# Patient Record
Sex: FEMALE | Race: WHITE | HISPANIC OR LATINO | Employment: FULL TIME | URBAN - METROPOLITAN AREA
[De-identification: names, ages, dates, MRNs, and addresses within clinical notes are randomized per-mention and may not be internally consistent; named-entity substitution may affect disease eponyms.]

---

## 2017-09-08 ENCOUNTER — NON-PROVIDER VISIT (OUTPATIENT)
Dept: OBGYN | Facility: CLINIC | Age: 22
End: 2017-09-08
Payer: MEDICAID

## 2017-09-08 DIAGNOSIS — Z32.01 POSITIVE URINE PREGNANCY TEST: ICD-10-CM

## 2017-09-08 LAB
INT CON NEG: NEGATIVE
INT CON POS: POSITIVE
POC URINE PREGNANCY TEST: POSITIVE

## 2017-09-08 PROCEDURE — 81025 URINE PREGNANCY TEST: CPT | Performed by: OBSTETRICS & GYNECOLOGY

## 2017-09-19 ENCOUNTER — HOSPITAL ENCOUNTER (EMERGENCY)
Facility: MEDICAL CENTER | Age: 22
End: 2017-09-20
Attending: EMERGENCY MEDICINE
Payer: MEDICAID

## 2017-09-19 ENCOUNTER — APPOINTMENT (OUTPATIENT)
Dept: RADIOLOGY | Facility: MEDICAL CENTER | Age: 22
End: 2017-09-19
Attending: EMERGENCY MEDICINE
Payer: MEDICAID

## 2017-09-19 DIAGNOSIS — O41.8X10 SUBCHORIONIC HEMATOMA IN FIRST TRIMESTER: ICD-10-CM

## 2017-09-19 DIAGNOSIS — N30.90 CYSTITIS: ICD-10-CM

## 2017-09-19 DIAGNOSIS — O46.8X1 SUBCHORIONIC HEMATOMA IN FIRST TRIMESTER: ICD-10-CM

## 2017-09-19 LAB
ALBUMIN SERPL BCP-MCNC: 4.9 G/DL (ref 3.2–4.9)
ALBUMIN/GLOB SERPL: 1.7 G/DL
ALP SERPL-CCNC: 59 U/L (ref 30–99)
ALT SERPL-CCNC: 11 U/L (ref 2–50)
ANION GAP SERPL CALC-SCNC: 7 MMOL/L (ref 0–11.9)
APPEARANCE UR: ABNORMAL
AST SERPL-CCNC: 14 U/L (ref 12–45)
B-HCG SERPL-ACNC: ABNORMAL MIU/ML (ref 0–5)
BACTERIA #/AREA URNS HPF: ABNORMAL /HPF
BASOPHILS # BLD AUTO: 0.3 % (ref 0–1.8)
BASOPHILS # BLD: 0.03 K/UL (ref 0–0.12)
BILIRUB SERPL-MCNC: 0.2 MG/DL (ref 0.1–1.5)
BILIRUB UR QL STRIP.AUTO: NEGATIVE
BUN SERPL-MCNC: 12 MG/DL (ref 8–22)
CALCIUM SERPL-MCNC: 9.8 MG/DL (ref 8.5–10.5)
CHLORIDE SERPL-SCNC: 107 MMOL/L (ref 96–112)
CO2 SERPL-SCNC: 24 MMOL/L (ref 20–33)
COLOR UR: YELLOW
CREAT SERPL-MCNC: 0.59 MG/DL (ref 0.5–1.4)
CULTURE IF INDICATED INDCX: YES UA CULTURE
EOSINOPHIL # BLD AUTO: 0.08 K/UL (ref 0–0.51)
EOSINOPHIL NFR BLD: 0.8 % (ref 0–6.9)
EPI CELLS #/AREA URNS HPF: ABNORMAL /HPF
ERYTHROCYTE [DISTWIDTH] IN BLOOD BY AUTOMATED COUNT: 52.2 FL (ref 35.9–50)
GFR SERPL CREATININE-BSD FRML MDRD: >60 ML/MIN/1.73 M 2
GLOBULIN SER CALC-MCNC: 2.9 G/DL (ref 1.9–3.5)
GLUCOSE SERPL-MCNC: 84 MG/DL (ref 65–99)
GLUCOSE UR STRIP.AUTO-MCNC: NEGATIVE MG/DL
HCG UR QL: POSITIVE
HCT VFR BLD AUTO: 34.4 % (ref 37–47)
HGB BLD-MCNC: 11.4 G/DL (ref 12–16)
HYALINE CASTS #/AREA URNS LPF: ABNORMAL /LPF
IMM GRANULOCYTES # BLD AUTO: 0.04 K/UL (ref 0–0.11)
IMM GRANULOCYTES NFR BLD AUTO: 0.4 % (ref 0–0.9)
KETONES UR STRIP.AUTO-MCNC: NEGATIVE MG/DL
LEUKOCYTE ESTERASE UR QL STRIP.AUTO: ABNORMAL
LYMPHOCYTES # BLD AUTO: 2.53 K/UL (ref 1–4.8)
LYMPHOCYTES NFR BLD: 26.8 % (ref 22–41)
MCH RBC QN AUTO: 27.9 PG (ref 27–33)
MCHC RBC AUTO-ENTMCNC: 33.1 G/DL (ref 33.6–35)
MCV RBC AUTO: 84.1 FL (ref 81.4–97.8)
MICRO URNS: ABNORMAL
MONOCYTES # BLD AUTO: 0.61 K/UL (ref 0–0.85)
MONOCYTES NFR BLD AUTO: 6.5 % (ref 0–13.4)
NEUTROPHILS # BLD AUTO: 6.15 K/UL (ref 2–7.15)
NEUTROPHILS NFR BLD: 65.2 % (ref 44–72)
NITRITE UR QL STRIP.AUTO: NEGATIVE
NRBC # BLD AUTO: 0 K/UL
NRBC BLD AUTO-RTO: 0 /100 WBC
NUMBER OF RH DOSES IND 8505RD: NORMAL
PH UR STRIP.AUTO: 5.5 [PH]
PLATELET # BLD AUTO: 282 K/UL (ref 164–446)
PMV BLD AUTO: 10.4 FL (ref 9–12.9)
POTASSIUM SERPL-SCNC: 4 MMOL/L (ref 3.6–5.5)
PROT SERPL-MCNC: 7.8 G/DL (ref 6–8.2)
PROT UR QL STRIP: NEGATIVE MG/DL
RBC # BLD AUTO: 4.09 M/UL (ref 4.2–5.4)
RBC # URNS HPF: ABNORMAL /HPF
RBC UR QL AUTO: ABNORMAL
RH BLD: NORMAL
SODIUM SERPL-SCNC: 138 MMOL/L (ref 135–145)
SP GR UR REFRACTOMETRY: 1.03
SP GR UR STRIP.AUTO: 1.03
UROBILINOGEN UR STRIP.AUTO-MCNC: 1 MG/DL
WBC # BLD AUTO: 9.4 K/UL (ref 4.8–10.8)
WBC #/AREA URNS HPF: ABNORMAL /HPF

## 2017-09-19 PROCEDURE — 80053 COMPREHEN METABOLIC PANEL: CPT

## 2017-09-19 PROCEDURE — 99284 EMERGENCY DEPT VISIT MOD MDM: CPT

## 2017-09-19 PROCEDURE — 81025 URINE PREGNANCY TEST: CPT

## 2017-09-19 PROCEDURE — 81001 URINALYSIS AUTO W/SCOPE: CPT

## 2017-09-19 PROCEDURE — 87086 URINE CULTURE/COLONY COUNT: CPT

## 2017-09-19 PROCEDURE — 86901 BLOOD TYPING SEROLOGIC RH(D): CPT

## 2017-09-19 PROCEDURE — 85025 COMPLETE CBC W/AUTO DIFF WBC: CPT

## 2017-09-19 PROCEDURE — 84702 CHORIONIC GONADOTROPIN TEST: CPT

## 2017-09-19 RX ORDER — NITROFURANTOIN 25; 75 MG/1; MG/1
100 CAPSULE ORAL 2 TIMES DAILY
Qty: 10 CAP | Refills: 0 | Status: SHIPPED | OUTPATIENT
Start: 2017-09-19 | End: 2017-09-24

## 2017-09-19 ASSESSMENT — ENCOUNTER SYMPTOMS
VOMITING: 0
CHILLS: 0
FEVER: 0
HEADACHES: 0
ABDOMINAL PAIN: 0

## 2017-09-19 ASSESSMENT — PAIN SCALES - GENERAL: PAINLEVEL_OUTOF10: 0

## 2017-09-19 ASSESSMENT — LIFESTYLE VARIABLES: DO YOU DRINK ALCOHOL: NO

## 2017-09-20 VITALS
OXYGEN SATURATION: 96 % | HEART RATE: 82 BPM | WEIGHT: 142.2 LBS | HEIGHT: 64 IN | SYSTOLIC BLOOD PRESSURE: 117 MMHG | TEMPERATURE: 97.2 F | BODY MASS INDEX: 24.28 KG/M2 | RESPIRATION RATE: 16 BRPM | DIASTOLIC BLOOD PRESSURE: 73 MMHG

## 2017-09-20 PROCEDURE — 76817 TRANSVAGINAL US OBSTETRIC: CPT

## 2017-09-20 NOTE — ED NOTES
Pt Given discharge instructions/ prescriptions/ home care instructions, Pt verbalized understanding of instructions given, pt ambulatory to CHANDRA mchugh.

## 2017-09-20 NOTE — ED NOTES
Received report from Diana VALERA. Pt care responsibilities assumed. Pt resting in bed, Monitors in place, VSS, will continue to monitor.

## 2017-09-20 NOTE — ED PROVIDER NOTES
"ED Provider Note    Scribed for TISH King II* by Long Mtaos. 2017  10:39 PM    Means of Arrival: Walk In  History obtained by: Patient  Limitations: None     CHIEF COMPLAINT  Chief Complaint   Patient presents with   • Vaginal Bleeding     Starting today.  Spotting.     • Pregnancy     Estimated 3 months pregnant, no pre- care currently.         HPI  Debi Juarez is a 21 y.o. female who presents for vaginal bleeding. She had an onset of vaginal bleeding this morning which has been light which she describes as \"spotting\". She reports her current vaginal bleeding is similar to her baseline menstrual periods. She denies any abdominal cramping associated with her vaginal bleeding. Debi estimates she found out she was pregnant in approximately July. She has not yet followed up with OBGYN. She has an appointment scheduled with OBGYN at [facility] on . She denies any lightheadedness, vomiting, abdominal pain, headache, fever, or chills.     REVIEW OF SYSTEMS  Review of Systems   Constitutional: Negative for chills and fever.   Gastrointestinal: Negative for abdominal pain and vomiting.   Genitourinary:        Positive vaginal bleeding    Neurological: Negative for headaches.        No lightheadedness    All other systems reviewed and are negative.    See HPI for further details.    PAST MEDICAL HISTORY   has a past medical history of Anemia.    SOCIAL HISTORY  Social History     Social History Main Topics   • Smoking status: Never Smoker   • Smokeless tobacco: Never Used   • Alcohol use No   • Drug use: No     SURGICAL HISTORY  patient denies any surgical history    CURRENT MEDICATIONS  Home Medications     Reviewed by Diana Mccall R.N. (Registered Nurse) on 17 at 2223  Med List Status: Complete   Medication Last Dose Status        Patient Christiano Taking any Medications                       ALLERGIES  No Known Allergies    PHYSICAL EXAM  VITAL SIGNS: BP " "120/75   Pulse 87   Temp 36.2 °C (97.2 °F) (Temporal)   Resp 18   Ht 1.626 m (5' 4\")   Wt 64.5 kg (142 lb 3.2 oz)   LMP 06/05/2017 (Approximate)   SpO2 99%   BMI 24.41 kg/m²     Pulse ox interpretation: I interpret this pulse ox as normal.  Constitutional:  female laying on bed in no distress.   HENT: No signs of trauma, Bilateral external ears normal, Nose normal.   Eyes: Pupils are equal and reactive, Conjunctiva normal, Non-icteric.   Neck: Normal range of motion, No tenderness, Supple, No stridor.   Lymphatic: No lymphadenopathy noted.   Cardiovascular: Regular rate and rhythm, no murmurs.   Thorax & Lungs: Normal breath sounds, No respiratory distress, No wheezing, No chest tenderness.   Abdomen: Palpable uterus and pelvis. Bowel sounds normal, Soft, No tenderness, No masses, No pulsatile masses. No peritoneal signs.  Pelvic: Closed os, mild amount of dark blood in vault. No adnexal tenderness.   Skin: Warm, Dry, No erythema, No rash.   Back: No bony tenderness, No CVA tenderness.   Extremities: Intact distal pulses, No edema, No tenderness, No cyanosis.  Musculoskeletal: Good range of motion in all major joints. No tenderness to palpation or major deformities noted.   Neurologic: Alert , Normal motor function, Normal sensory function, No focal deficits noted.   Psychiatric: Affect normal, Judgment normal, Mood normal.       DIAGNOSTIC STUDIES / PROCEDURES    LABS  Pertinent Labs & Imaging studies reviewed. (See chart for details)    RADIOLOGY  US-OB PELVIS TRANSVAGINAL   Final Result         1.  Live intrauterine pregnancy at calculated gestational age 11 weeks 1 day for estimated date of delivery April 9, 2018.   2.  Subchorionic hemorrhage.   3.  Large heterogeneous echogenic area near the internal cervical os without vascularity, could represent clotted blood or poorly vascular echogenic mass. Follow-up evaluation with repeat pelvic sonogram in 4 weeks recommended. If findings persist MRI of "    the pelvis may be required for definitive evaluation.      These findings were discussed with the patient's clinician, Mehul Crystal II, on 2017 12:22 AM.         Pertinent Labs & Imaging studies reviewed. (See chart for details)    COURSE & MEDICAL DECISION MAKING  Pertinent Labs & Imaging studies reviewed. (See chart for details)    10:39 PM This is a 21 y.o. female who presents with vaginal bleeding and the differential diagnosis includes but is not limited to threatened , UTI. Ordered for RH Type, CBC, CMP, urinalysis, BetaHCG, urine culture to evaluate.    12:13 AM Performed pelvic exam. Chaperone by female RN.     1:10 AM Recheck: Patient re-evaluated at [facility]. The patient was updated of her US OB results showing live intrauterine pregnancy at calculated gestational age of 11 weeks 1 day. Estimated date of delivery: 18.      1:17 AM Spoke with Dr. Lopez, Gynecology, about the patient's condition. She recommended progesterone suppository 100 mg BID. Does not need to follow up earlier than her  appointment.     1:26 AM Recheck: Patient re-evaluated at [facility]. The patient was updated of plan of care that was established after consulting with Dr. Lopez as noted above. She understands to continue with her scheduled appointment with [facility] on . Patient was prescribed Macrobid for cystitis.   Prescription for Progesterone suppositories at recommendation of Dr. Lopez     The patient will return for worsening symptoms and is stable at the time of discharge. The patient verbalizes understanding and will comply.      DISPOSITION:  Patient will be discharged home in stable condition.    FOLLOW UP:  [facility]  [address] [address]  [phone]    Go to  As previously scheduled on     [facility], Emergency Dept  [address] [id number] [phone]  Go to  If symptoms  worsen, light headed, uncontrolled bleeding, fevers or other serious concerns      OUTPATIENT MEDICATIONS:  Discharge Medication List as of 9/20/2017  1:28 AM      START taking these medications    Details   nitrofurantoin monohydr macro (MACROBID) 100 MG Cap Take 1 Cap by mouth 2 times a day for 5 days., Disp-10 Cap, R-0, Print Rx Paper              FINAL IMPRESSION  1. Subchorionic hemorrhage  2. Cystitis     Future Appointments  Date Time Provider Department Center   10/2/2017 1:30 PM PC MD PCTR Long WELLS (Scribe), am scribing for, and in the presence of, JAMISON King II.    Electronically signed by: Long Matos (Tegan), 9/19/2017    Mehul ZEPEDA II, M* personally performed the services described in this documentation, as scribed by Long Matos in my presence, and it is both accurate and complete.    The note accurately reflects work and decisions made by me.  Mehul Crystal II  9/20/2017  12:33 AM

## 2017-09-20 NOTE — ED NOTES
.  Chief Complaint   Patient presents with   • Vaginal Bleeding     Starting today.  Spotting.     • Pregnancy     Estimated 3 months pregnant, no pre- care currently.       BIB boyfriend for above.  Denies abd pain or other symptoms.     Patient educated on triage process and wait time.  Instructed to notify staff for worsening or new symptoms.  Placed in lobby.

## 2017-09-20 NOTE — DISCHARGE INSTRUCTIONS
Urinary Tract Infection  Urinary tract infections (UTIs) can develop anywhere along your urinary tract. Your urinary tract is your body's drainage system for removing wastes and extra water. Your urinary tract includes two kidneys, two ureters, a bladder, and a urethra. Your kidneys are a pair of bean-shaped organs. Each kidney is about the size of your fist. They are located below your ribs, one on each side of your spine.  CAUSES  Infections are caused by microbes, which are microscopic organisms, including fungi, viruses, and bacteria. These organisms are so small that they can only be seen through a microscope. Bacteria are the microbes that most commonly cause UTIs.  SYMPTOMS   Symptoms of UTIs may vary by age and gender of the patient and by the location of the infection. Symptoms in young women typically include a frequent and intense urge to urinate and a painful, burning feeling in the bladder or urethra during urination. Older women and men are more likely to be tired, shaky, and weak and have muscle aches and abdominal pain. A fever may mean the infection is in your kidneys. Other symptoms of a kidney infection include pain in your back or sides below the ribs, nausea, and vomiting.  DIAGNOSIS  To diagnose a UTI, your caregiver will ask you about your symptoms. Your caregiver also will ask to provide a urine sample. The urine sample will be tested for bacteria and white blood cells. White blood cells are made by your body to help fight infection.  TREATMENT   Typically, UTIs can be treated with medication. Because most UTIs are caused by a bacterial infection, they usually can be treated with the use of antibiotics. The choice of antibiotic and length of treatment depend on your symptoms and the type of bacteria causing your infection.  HOME CARE INSTRUCTIONS  · If you were prescribed antibiotics, take them exactly as your caregiver instructs you. Finish the medication even if you feel better after you  have only taken some of the medication.  · Drink enough water and fluids to keep your urine clear or pale yellow.  · Avoid caffeine, tea, and carbonated beverages. They tend to irritate your bladder.  · Empty your bladder often. Avoid holding urine for long periods of time.  · Empty your bladder before and after sexual intercourse.  · After a bowel movement, women should cleanse from front to back. Use each tissue only once.  SEEK MEDICAL CARE IF:   · You have back pain.  · You develop a fever.  · Your symptoms do not begin to resolve within 3 days.  SEEK IMMEDIATE MEDICAL CARE IF:   · You have severe back pain or lower abdominal pain.  · You develop chills.  · You have nausea or vomiting.  · You have continued burning or discomfort with urination.  MAKE SURE YOU:   · Understand these instructions.  · Will watch your condition.  · Will get help right away if you are not doing well or get worse.     This information is not intended to replace advice given to you by your health care provider. Make sure you discuss any questions you have with your health care provider.     Document Released: 2006 Document Revised: 2016 Document Reviewed: 2013  [employer] Interactive Patient Education © [employer]vier Inc.    Pregnancy and Urinary Tract Infection  A urinary tract infection (UTI) is a bacterial infection of the urinary tract. Infection of the urinary tract can include the ureters, kidneys (pyelonephritis), bladder (cystitis), and urethra (urethritis). All pregnant women should be screened for bacteria in the urinary tract. Identifying and treating a UTI will decrease the risk of  labor and developing more serious infections in both the mother and baby.  CAUSES  Bacteria germs cause almost all UTIs.   RISK FACTORS  Many factors can increase your chances of getting a UTI during pregnancy. These include:  · Having a short urethra.  · Poor toilet and hygiene habits.  · Sexual intercourse.  · Blockage of  urine along the urinary tract.  · Problems with the pelvic muscles or nerves.  · Diabetes.  · Obesity.  · Bladder problems after having several children.  · Previous history of UTI.  SIGNS AND SYMPTOMS   · Pain, burning, or a stinging feeling when urinating.  · Suddenly feeling the need to urinate right away (urgency).  · Loss of bladder control (urinary incontinence).  · Frequent urination, more than is common with pregnancy.  · Lower abdominal or back discomfort.  · Cloudy urine.  · Blood in the urine (hematuria).  · Fever.   When the kidneys are infected, the symptoms may be:  · Back pain.  · Flank pain on the right side more so than the left.  · Fever.  · Chills.  · Nausea.  · Vomiting.  DIAGNOSIS   A urinary tract infection is usually diagnosed through urine tests. Additional tests and procedures are sometimes done. These may include:  · Ultrasound exam of the kidneys, ureters, bladder, and urethra.  · Looking in the bladder with a lighted tube (cystoscopy).  TREATMENT  Typically, UTIs can be treated with antibiotic medicines.   HOME CARE INSTRUCTIONS   · Only take over-the-counter or prescription medicines as directed by your health care provider. If you were prescribed antibiotics, take them as directed. Finish them even if you start to feel better.  · Drink enough fluids to keep your urine clear or pale yellow.  · Do not have sexual intercourse until the infection is gone and your health care provider says it is okay.  · Make sure you are tested for UTIs throughout your pregnancy. These infections often come back.   Preventing a UTI in the Future  · Practice good toilet habits. Always wipe from front to back. Use the tissue only once.  · Do not hold your urine. Empty your bladder as soon as possible when the urge comes.  · Do not douche or use deodorant sprays.  · Wash with soap and warm water around the genital area and the anus.  · Empty your bladder before and after sexual intercourse.  · Wear underwear  with a cotton crotch.  · Avoid caffeine and carbonated drinks. They can irritate the bladder.  · Drink cranberry juice or take cranberry pills. This may decrease the risk of getting a UTI.  · Do not drink alcohol.  · Keep all your appointments and tests as scheduled.   SEEK MEDICAL CARE IF:   · Your symptoms get worse.  · You are still having fevers 2 or more days after treatment begins.  · You have a rash.  · You feel that you are having problems with medicines prescribed.  · You have abnormal vaginal discharge.  SEEK IMMEDIATE MEDICAL CARE IF:   · You have back or flank pain.  · You have chills.  · You have blood in your urine.  · You have nausea and vomiting.  · You have contractions of your uterus.  · You have a gush of fluid from the vagina.  MAKE SURE YOU:  · Understand these instructions.    · Will watch your condition.    · Will get help right away if you are not doing well or get worse.       This information is not intended to replace advice given to you by your health care provider. Make sure you discuss any questions you have with your health care provider.     Document Released: 04/13/2012 Document Revised: 10/08/2014 Document Reviewed: 07/17/2014  [employer] Interactive Patient Education ©2016 [employer]    Infección urinaria en el embarazo   (Urinary Tract Infection in Pregnancy)  Zoraida infección urinaria (IU) puede ocurrir en cualquier lugar del tracto urinario. La infección urinaria puede comprometer los utéteres, los riñones (pielonefritis), la vejiga (cistitis) y la uretra (uretritis). Todas las mujeres embarazadas deben ser estudiadas para diagnosticar la presencia de bacterias en el tracto urinario. La identificación y el tratamiento de zoraida infección urinaria disminuye el riesgo de un parto prematuro y de desarrollar infecciones más graves en la madre y el bebé.   CAUSAS   Las bacterias causan norma todas las infecciones urinarias. Hay muchos factores que pueden aumentar nehal probabilidades de  contraer jas infección urinaria lee el embarazo. Ellos son:   · Tener jas uretra corta.  · Falta de aseo y malos hábitos de higiene.  · Las relaciones sexuales.  · Obstrucción de la orina en el tracto urinario.  · Problemas con los músculos o nervios pélvicos.  · Diabetes.  · Obesidad.  · Problemas en la vejiga después de tener varios hijos.  · Antecedentes de infección urinaria.  SÍNTOMAS   · Dolor, ardor o sensación de ardor al orinar.  · Sentir la necesidad de orinar de inmediato (urgencia).  · Pérdida del control vesical (incontinencia urinaria).  · Orinar con más frecuencia de lo común en el embarazo.  · Malestar en la gisele inferior del abdomen o en la espalda.  · Orina turbia.  · Ole en la orina (hematuria).  · Fiebre.   Cuando se infectan los riñones, los síntomas pueden ser:   · Dolor de espalda.  · Dolor lateral en el lado derecho más que el lado boaz.  · Fiebre.  · Escalofríos.  · Náuseas.  · Vómitos.  DIAGNÓSTICO   · Análisis de orina.  · Los estudios o procedimientos adicionales pueden ser:  · Ecografía de los riñones, los uréteres, la vejiga y la uretra.  · Observar la vejiga con un tubo que ilumina (cistoscopia).  · Ciertos estudios radiográficos sólo cuando sea absolutamente necesario.  Averigüe los resultados de nehal análisis  Consulte la fecha en que los resultados estarán disponibles. Asegúrese de obtener los resultados.   TRATAMIENTO   · Antibióticos por vía oral.  · Antibióticos por la vena (vía intravenosa), si es necesario.  INSTRUCCIONES PARA EL CUIDADO EN EL HOGAR   · [place] los antibióticos kaite se le indicó. Tómelos todos, aunque se sienta mejor. [place] sólo la medicación que le indicó el profesional.  · Debe ingerir jas gran cantidad de líquido para mantener la orina de nicolás jose alberto o color amarillo pálido.  · No tenga relaciones sexuales hasta que la infección haya desaparecido o el médico la autorice.  · Asegúrese de que le milagros estudios para detectar jas infección urinaria lee  el embarazo si está embarazada. Estas infecciones suelen reaparecer.   Para prevenir jas infección urinaria en el futuro:  · Practique buenos hábitos higiénicos. Siempre debe limpiarse desde adelante hacia atrás. Use el tissue sólo jas vez.  · No retenga la orina. Orine herzog pronto katie sea posible cuando tenga ganas.  · No se honey duchas vaginales ni use desodorantes en aerosol.  · Lave con agua tibia y jabón alrededor de la gisele genital y el ano.  · Vacíe la vejiga antes y después de tener relaciones sexuales.  · Use ropa interior con algodón en la entrepierna.  · Evite la cafeína y las bebidas gaseosas. Estas sustancias irritan la vejiga.  · Paulette jugo de arándanos o tome comprimidos de arándano. [place] puede disminuir el riesgo de sufrir jas infección urinaria.  · No paulette alcohol.  · Cumpla con las visitas de control y hágase todos los análisis según lo programado.   SOLICITE ATENCIÓN MÉDICA SI:   · Los síntomas empeoran.  · Tiene fiebre aún después de 2 días de iniciado el tratamiento.  · Aparece jas erupción cutánea.  · Siente que usted tiene problemas con los medicamentos recetados.  · Observa jas hemorragia vaginal anormal.  SOLICITE ATENCIÓN MÉDICA DE INMEDIATO SI:   · Siente dolor en la espalda o en un flanco.  · Comienza a sentir escalofríos.  · Observa cindy en la orina.  · Presenta náuseas o vómitos.  · Siente contracciones en el útero.  · Tiene jas perdida repentina de líquido por la vagina.  ASEGÚRESE DE QUE:   · Comprende estas instrucciones.  · Controlará pollock enfermedad.  · Solicitará ayuda de inmediato si no mejora o si empeora.  Document Released: 09/11/2013 Document Revised: 12/04/2013  [employer]® Patient Information ©2014 [employer].      Hematoma subcoriónico  (Subchorionic Hematoma)  Un hematoma subcoriónico es jsa acumulación de cindy entre la pared externa de la placenta y la pared interna del la matriz (útero). La placenta es el órgano que conecta el feto a la pared del útero. La placenta  realiza la función de alimentación, respiración (oxígeno al feto) y el trabajo de eliminación de desechos (excreción) del feto.   Un hematoma subcoriónico es la anormalidad más frecuente encontrada en jas ecografía lee el primer trimestre o principios del padmini trimestre del embarazo. Si ha habido poca o ninguna hemorragia vaginal, generalmente los pequeños hematomas se reducen por pollock propia cuenta y no afectan al bebé ni al embarazo. La cindy es absorbida gradualmente lee jas o dos semanas. Cuando la hemorragia comienza más tarde en el embarazo o el hematoma es más shade o se produce en jas paciente de edad avanzada, el resultado puede no ser herzog cotton. Los grandes hematomas pueden agrandarse aún más y aumenta las posibilidades de aborto espontáneo. El hematoma subcoriónico también aumenta el riesgo de desprendimiento precoz de la placenta del útero, muerte fetal y parto prematuro.  INSTRUCCIONES PARA EL CUIDADO EN EL HOGAR   · Repose en cama si el médico se lo recomienda. Aunque el reposo en cama no evitará la hemorragia o un aborto espontáneo, pollock médico puede recomendarlo.  · Evite levantar objetos pesados (más de 10 libras [4,5 kg]), hacer ejercicio, tener relaciones sexuales o realizar duchas vaginales según se lo indique el profesional.  · Lleve un registro de la cantidad y aaron de remojo (saturación) de las toallas higiénicas que utiliza cada día. Anote esta información.  · No use tampones.  · Cumpla con todas las visitas de control, según le indique pollock médico. El profesional podrá pedirle que se realice análisis de seguimiento, pruebas de ultrasonido o ambas.  SOLICITE ATENCIÓN MÉDICA DE INMEDIATO SI:   · Siente calambres intensos en el estómago, en la espalda, en el abdomen o en la pelvis.  · Tiene fiebre.  · Elimina coágulos o tejidos grandes. Guarde los tejidos para que pollock médico los nely.  · Si la hemorragia aumenta o siente mareos, debilidad o tiene episodios de desmayos.     Esta  información no tiene katie fin reemplazar el consejo del médico. Asegúrese de hacerle al médico cualquier pregunta que tenga.     Document Released: 04/05/2010 Document Revised: 10/08/2014  [employer]vier Interactive Patient Education ©2016 [employer] Inc.

## 2017-09-21 LAB
BACTERIA UR CULT: NORMAL
SIGNIFICANT IND 70042: NORMAL
SITE SITE: NORMAL
SOURCE SOURCE: NORMAL

## 2017-10-02 ENCOUNTER — INITIAL PRENATAL (OUTPATIENT)
Dept: OBGYN | Facility: CLINIC | Age: 22
End: 2017-10-02
Payer: MEDICAID

## 2017-10-02 VITALS
HEIGHT: 64 IN | BODY MASS INDEX: 23.9 KG/M2 | SYSTOLIC BLOOD PRESSURE: 104 MMHG | DIASTOLIC BLOOD PRESSURE: 64 MMHG | WEIGHT: 140 LBS

## 2017-10-02 DIAGNOSIS — N93.8 DUB (DYSFUNCTIONAL UTERINE BLEEDING): ICD-10-CM

## 2017-10-02 PROCEDURE — 76830 TRANSVAGINAL US NON-OB: CPT | Performed by: OBSTETRICS & GYNECOLOGY

## 2017-10-02 PROCEDURE — 99201 PR OFFICE/OUTPT VISIT,NEW,LEVL I: CPT | Mod: 25 | Performed by: OBSTETRICS & GYNECOLOGY

## 2017-10-02 NOTE — PROGRESS NOTES
Pt. Here for  visit today.  # [phone]  First prenatal care  Pt. States no complaints   Pharmacy verified

## 2017-10-02 NOTE — PROGRESS NOTES
"Confirmation of pregnancy;    eDbi Juarez is a 22 y.o.  here for dysfunctional uterine bleeding. Patient denies bleeding or cramps.    Ht 1.626 m (5' 4\")   Wt 63.5 kg (140 lb)   LMP 2017   BMI 24.03 kg/m²     Transvaginal ultrasound; as performed and read by myself; intrauterine gestational sac containing headley pregnancy positive cardiac and fetal motion, crown-rump length consistent with 13 weeks zero days, EDC 18, minimal evidence of subchorionic hemorrhage        Impression;  Viable pregnancy at 13 weeks    Plan;  Followup with nurse midwife in 2 weeks for initial OB            "

## 2017-10-25 ENCOUNTER — INITIAL PRENATAL (OUTPATIENT)
Dept: OBGYN | Facility: CLINIC | Age: 22
End: 2017-10-25
Payer: MEDICAID

## 2017-10-25 VITALS — DIASTOLIC BLOOD PRESSURE: 70 MMHG | WEIGHT: 143 LBS | BODY MASS INDEX: 24.55 KG/M2 | SYSTOLIC BLOOD PRESSURE: 100 MMHG

## 2017-10-25 DIAGNOSIS — Z34.00 SUPERVISION OF NORMAL FIRST PREGNANCY, ANTEPARTUM: ICD-10-CM

## 2017-10-25 DIAGNOSIS — Z34.02 ENCOUNTER FOR SUPERVISION OF NORMAL FIRST PREGNANCY IN SECOND TRIMESTER: Primary | ICD-10-CM

## 2017-10-25 LAB
APPEARANCE UR: NORMAL
BILIRUB UR STRIP-MCNC: NORMAL MG/DL
COLOR UR AUTO: NORMAL
GLUCOSE UR STRIP.AUTO-MCNC: NEGATIVE MG/DL
KETONES UR STRIP.AUTO-MCNC: NEGATIVE MG/DL
LEUKOCYTE ESTERASE UR QL STRIP.AUTO: NORMAL
NITRITE UR QL STRIP.AUTO: NEGATIVE
PH UR STRIP.AUTO: 6 [PH] (ref 5–8)
PROT UR QL STRIP: NORMAL MG/DL
RBC UR QL AUTO: NORMAL
SP GR UR STRIP.AUTO: 1.02
UROBILINOGEN UR STRIP-MCNC: NORMAL MG/DL

## 2017-10-25 PROCEDURE — 81002 URINALYSIS NONAUTO W/O SCOPE: CPT | Performed by: NURSE PRACTITIONER

## 2017-10-25 PROCEDURE — 59401 PR NEW OB VISIT: CPT | Performed by: NURSE PRACTITIONER

## 2017-10-25 PROCEDURE — 90686 IIV4 VACC NO PRSV 0.5 ML IM: CPT | Performed by: NURSE PRACTITIONER

## 2017-10-25 PROCEDURE — 90471 IMMUNIZATION ADMIN: CPT | Performed by: NURSE PRACTITIONER

## 2017-10-25 NOTE — LETTER
Cystic Fibrosis Carrier Testing  Debi Juarez    The following information is about a blood test that can be done to determine if you and/or your partner carry the gene for cystic fibrosis.    WHAT IS CYSTIC FIBROSIS?  · Cystic fibrosis (CF) is an inherited disease that affects more than 25,000 American children and young adults.  · Symptoms of CF vary but include lung congestion, pneumonia, diarrhea and poor growth.  Most people with CF have severe medical problems and some die at a young age.  Others have so few symptoms they are unaware they have CF.  · CF does not affect intelligence.  · Although there is no cure for CF at this time, scientists are making progress in improving treatment and in searching for a cure.  In the past many people with CF  at a very young age.  Today, many are living into their 20’s and 30’s.    IS THERE A CHANCE MY BABY COULD HAVE CYSTIC FIBROSIS?  · You can have a child with CF even if there is no history in your family (see chart below).  · CF testing can help determine if you are a carrier and at risk to have a child with CF.  Note: if both parents are carriers, there is a 1 in 4 (25%) chance with each pregnancy that they will have a child with CF.  · Carriers have one normal CF gene and one altered CF gene.  · People with CF have two altered CF genes.  · Most people have two normal copies of the CF gene.    Approximate risk that a couple with no family history of cystic fibrosis will have a child with cystic fibrosis:    Ethnic background / Risk     couple:  1 in 2,500   couple:  1 in 15,000            couple:  1 in 8,000     American couple:  1 in 32,000     WHAT TESTING IS AVAILABLE?  · There is a blood test that can be done to find out if you or your partner is a carrier.  · It is important to understand that CF carrier testing does not detect all CF carriers.  · If the test shows that you are both CF carriers, you unborn baby  can be tested to find out if the baby has CF.    HOW MUCH DOES IT COST TO HAVE CYSTIC FIBROSIS CARRIER TESTING?  · Cost and insurance coverage for CF carrier testing vary depending upon the laboratory used and your insurance policy.  · The average cost for CF carrier testing is $300 per person.  · Your genetic counselor can provide you with more information about cystic fibrosis carrier testing.    _____  Yes, I am interested in discussing carrier testing with a genetic counselor.    _____  No, I am not interested in CF carrier testing or in receiving more information about CF carrier testing.      Client signature: ________________________________________  10/25/2017

## 2017-10-25 NOTE — PROGRESS NOTES
Subjective:   Debi Juarez is a 22 y.o.  who presents for her new OB exam.  She is 16w2d with an ZAIN of Estimated Date of Delivery: 18 by  at 13 weeks. She is feeling well and has no concerns at this time. Denies VB, LOF, contractions or pain. No ER visits or previous care in this pregnancy. Denies dysuria, vaginal DC, fever. Reports no fetal movement. Desires AFP.  Declines CF.      Past Medical History:   Diagnosis Date   • Anemia        History reviewed. No pertinent surgical history.     OB History    Para Term  AB Living   1             SAB TAB Ectopic Molar Multiple Live Births                    # Outcome Date GA Lbr Rosales/2nd Weight Sex Delivery Anes PTL Lv   1 Current                    Gynecological Hx: Denies any hx of STIs, including HSV. Denies any vulvovaginal disorders and no hx of abnormal cervical cytology. Last pap not done    Sexual Hx: One current male partner, who is FOB     Contraceptive Hx: Has used pills in the past and has since discontinued use.     History reviewed. No pertinent family history.  Denies any genetic disorders in family history.     Social History     Social History   • Marital status: Single     Spouse name: N/A   • Number of children: N/A   • Years of education: N/A     Occupational History   • Not on file.     Social History Main Topics   • Smoking status: Never Smoker   • Smokeless tobacco: Never Used   • Alcohol use No   • Drug use: No   • Sexual activity: Not Currently     Partners: Male      Comment: none     Other Topics Concern   • Not on file     Social History Narrative   • No narrative on file       FOB is invovled and lives with Debi Juarez.  Pregnancy is unplanned but desired.    She is currently working at [employer], denies any heavy lifting or exposure to potential teratogens like environmental or occupational toxins.   Denies alcohol use, drug use, or tobacco use in pregnancy.   Denies any current or hx of  sexual, emotional or physical abuse or trauma.     Current Medications: PNV, progesterone suppositories   Allergies: Denies allergies to medications, food, or environmental allergies    Objective:      Vitals:    10/25/17 0804   BP: 100/70   Weight: 64.9 kg (143 lb)        See Prenatal Physical and Prenatal Vitals  UA WNL today      Assessment:      1.  IUP @ 16w2d per  at 13 weeks      2.  S=D      3.  See problem list as follows       There are no active problems to display for this patient.        Plan:   - Pt to discontinue progesterone suppositories at this time as pregnancy is stable  -  GC/CT & pap done today   - AFP ordered  - Flu vaccine today   - Prenatal labs ordered - lab slip given  - Discussed PNV, nutrition, adequate water intake, and exercise/weight gain in pregnancy  - NOB informational packet with anticipatory guidance given  - Information on Centering Pregnancy given, pt declines  - S/sx of pregnancy warning signs and PTL precautions given  - Complete OB US in 4 wks  - Return to [facility] in 4 wks

## 2017-10-25 NOTE — PROGRESS NOTES
NOB today.  10/2/17  On PNV  First pap today  Phone # [phone]  Pharmacy confirmed  No problems today  AFP offered and declined. Refusal form signed  Flu vaccine today. Flu vaccine given. R  Deltoid. Screening checklist reviewed with patient. VIS given

## 2017-10-25 NOTE — PATIENT INSTRUCTIONS
- Pt to discontinue progesterone suppositories at this time as pregnancy is stable   - GC/CT & pap done today   - AFP ordered  - Flu vaccine today   - Prenatal labs ordered - lab slip given  - Discussed PNV, nutrition, adequate water intake, and exercise/weight gain in pregnancy  - NOB informational packet with anticipatory guidance given  - Information on Centering Pregnancy given, pt declines  - S/sx of pregnancy warning signs and PTL precautions given  - Complete OB US in 4 wks  - Return to [facility] in 4 wks

## 2017-10-30 LAB
C TRACH DNA SPEC QL NAA+PROBE: NORMAL
N GONORRHOEA DNA SPEC QL NAA+PROBE: NORMAL
PHYSICIAN READ PAP  881411: NORMAL

## 2017-10-31 DIAGNOSIS — R87.612 LGSIL ON PAP SMEAR OF CERVIX: ICD-10-CM

## 2017-11-01 ENCOUNTER — TELEPHONE (OUTPATIENT)
Dept: OBGYN | Facility: CLINIC | Age: 22
End: 2017-11-01

## 2017-11-01 NOTE — TELEPHONE ENCOUNTER
Abnormal pap smear (LSIL) needs colposcopy.    Pt notified of abnormal pap and need colposcopy. Procedure explained to pt. Colpo scheduled for 11/13/17 at 0900. All questions answered. Pt agreed and verbalized understanding.

## 2017-11-22 ENCOUNTER — ROUTINE PRENATAL (OUTPATIENT)
Dept: OBGYN | Facility: CLINIC | Age: 22
End: 2017-11-22
Payer: MEDICAID

## 2017-11-22 ENCOUNTER — DATING (OUTPATIENT)
Dept: OBGYN | Facility: CLINIC | Age: 22
End: 2017-11-22

## 2017-11-22 ENCOUNTER — APPOINTMENT (OUTPATIENT)
Dept: RADIOLOGY | Facility: IMAGING CENTER | Age: 22
End: 2017-11-22
Attending: NURSE PRACTITIONER
Payer: MEDICAID

## 2017-11-22 VITALS — WEIGHT: 145 LBS | DIASTOLIC BLOOD PRESSURE: 62 MMHG | BODY MASS INDEX: 24.89 KG/M2 | SYSTOLIC BLOOD PRESSURE: 98 MMHG

## 2017-11-22 DIAGNOSIS — Z34.02 ENCOUNTER FOR SUPERVISION OF NORMAL FIRST PREGNANCY IN SECOND TRIMESTER: ICD-10-CM

## 2017-11-22 DIAGNOSIS — Z34.00 SUPERVISION OF NORMAL FIRST PREGNANCY, ANTEPARTUM: ICD-10-CM

## 2017-11-22 PROCEDURE — 90040 PR PRENATAL FOLLOW UP: CPT | Performed by: NURSE PRACTITIONER

## 2017-11-22 PROCEDURE — 76805 OB US >/= 14 WKS SNGL FETUS: CPT | Mod: 26 | Performed by: OBSTETRICS & GYNECOLOGY

## 2017-11-22 ASSESSMENT — PATIENT HEALTH QUESTIONNAIRE - PHQ9: CLINICAL INTERPRETATION OF PHQ2 SCORE: 0

## 2017-11-22 NOTE — PROGRESS NOTES
Ob f/u. + fetal movement baby is moving already   No VB, LOF or contractions   C/O  No complaints today   Phone number # [phone]  Pharmacy verified with patient  WT=   145 lbs           BP=98/62  Labs not done yet. Slip printed today, given to pt along with instructions

## 2017-11-22 NOTE — PROGRESS NOTES
S) Pt is a 22 y.o.   at 20w2d  gestation. Routine prenatal care today. No complaints today. US done today, no results yet. Hasn't yet done blood work because of work, but she has the morning off so she is encouraged to go right now and get them done. All questions answered.    Fetal movement Normal  Cramping no,  VB no  LOF no   Denies dysuria. Generally feels well today. Good self-care activities identified. Denies headaches, swelling, visual changes, or epigastric pain .     O) Blood pressure (!) 98/62, weight 65.8 kg (145 lb), last menstrual period 2017.        Labs:       PNL: Pending       GCT: Too early       AFP: Pending       GBS: N/A       Pertinent ultrasound -        Done today, results pending    A) IUP at 20w2d       S=D         Patient Active Problem List    Diagnosis Date Noted   • Supervision of normal first pregnancy 10/25/2017     Priority: Medium                 TDAP: no       FLU: yes        BTL: no       : n/a    P) s/s ptl vs general discomforts. Fetal movements reviewed. General ed and anticipatory guidance. Nutrition/exercise/vitamin. Plans breast Plans pp contraception- unsure  Continue PNV.

## 2017-11-25 LAB
2ND TRIMESTER 4 SCREEN SERPL-IMP: NORMAL
ABO GROUP BLD: NORMAL
BLD GP AB SCN SERPL QL: NORMAL
HBV SURFACE AG SERPL QL IA: NON REACTIVE
HCT VFR BLD AUTO: NORMAL %
HGB BLD-MCNC: NORMAL G/DL
HIV 1 0 2 IC ZHVIC: NON REACTIVE
PLATELET # BLD AUTO: NORMAL 10*3/UL
RH BLD: NORMAL
RPR SER QL: NON REACTIVE
RUBV IGG SERPL IA-ACNC: NORMAL

## 2017-11-28 ENCOUNTER — TELEPHONE (OUTPATIENT)
Dept: OBGYN | Facility: CLINIC | Age: 22
End: 2017-11-28

## 2017-11-28 NOTE — LETTER
November 29, 2017          Dear Debi Juarez,      Please call us regarding your care.  One of the nurses is available to speak with you Monday through Friday, 8 a.m. to 5 p.m.    Please call us at [phone]; thank you for your prompt attention.        Sincerely,          Kelly Magana MA    Electronically Signed

## 2017-11-28 NOTE — TELEPHONE ENCOUNTER
----- Message from Juana Zarate M.D. sent at 11/22/2017  2:48 PM PST -----  Not all fetal anatomy visualized on screening ultrasound patient will need repeat ultrasound in 4 weeks to complete scan  11/29/17. Patient's number, N/a, unable to leave msg. Voice mail not set up.  Emergency contact number disconnected. Letter sent

## 2017-12-11 ENCOUNTER — GYNECOLOGY VISIT (OUTPATIENT)
Dept: OBGYN | Facility: CLINIC | Age: 22
End: 2017-12-11
Payer: MEDICAID

## 2017-12-11 VITALS — DIASTOLIC BLOOD PRESSURE: 60 MMHG | WEIGHT: 151 LBS | SYSTOLIC BLOOD PRESSURE: 100 MMHG | BODY MASS INDEX: 25.92 KG/M2

## 2017-12-11 DIAGNOSIS — R87.612 LGSIL ON PAP SMEAR OF CERVIX: ICD-10-CM

## 2017-12-11 PROCEDURE — 57452 EXAM OF CERVIX W/SCOPE: CPT | Performed by: OBSTETRICS & GYNECOLOGY

## 2017-12-11 PROCEDURE — 90040 PR PRENATAL FOLLOW UP: CPT | Performed by: OBSTETRICS & GYNECOLOGY

## 2017-12-11 NOTE — LETTER
COLPOSCOPY / LEEP DATA SHEET    Debi Juarez     1995      22 y.o.      Patient's last menstrual period was 06/24/2017.     @EDC@       Medical history:   o MVP    o Blood dyscrasia    o Rh     o Other: .    STD history:    o HPV     o HSV     o HIV     o GC     o Chlamydia     o None     o Other: .    HIV:   o Positive     o Negative                            IV Drug User:   o  Yes    o  No .    Age of first coitus:                                                Number of sex partners in lifetime:  .    Reason for exam:.    Prior abnormal PAPS?    o  Yes  o  No        Treatment: .    Prior history of condyloma?    o  Yes  o  No        Treatment:.     Colposcopic view - description:                                 Satisfactory?    o  Yes  o  No                    Squamo-columnar junction seen?  o  Yes  o  No.    Entire lesion seen?     o  Yes  o  No          PAP done?  o  Yes  o  No           Biopsies done?  o  Yes  o  No.    Biopsy sites:.    ECC done?    o  Yes  o  No      If no, reason:.    Impression:.    Recommendations:.             Colposcopist: ______________________________________________ Date: ___________________   (0) independent (2) assistive person

## 2017-12-11 NOTE — PROGRESS NOTES
Pt. Here for OB/FU for Colpo today. Reports Good FM.   Good # [phone]  Pt states no complaints.   Pharmacy verified.   Not all fetal anatomy visualized on screening ultrasound patient will need repeat ultrasound in 4 weeks to complete scan, per FYI

## 2017-12-11 NOTE — PROGRESS NOTES
Procedure note; COLPOSCOPY    Indications; Pap smear; LGSIL on Pap    Informed consent obtained, consent signed    Urine pregnancy test negative    Vaginal speculum placed    3% acetic acid solution applied to cervix    Coloscopy performed    Entire transformation zone visualized    Findings; diffuse flat condyloma on the anterior lip of the cervix  Acetowhite epithelium with focal mosaicism at 7:00 on the ectocervix            Biopsies taken; not taken due to advanced age of pregnancy      Patient needs colposcopy with biopsy after 6 week postpartum checkup  Discussed the risks of cervical dysplasia and possible cervical cancer if follow-up is not performed.        Rustam Hill MD

## 2017-12-20 ENCOUNTER — DATING (OUTPATIENT)
Dept: OBGYN | Facility: MEDICAL CENTER | Age: 22
End: 2017-12-20

## 2017-12-20 ENCOUNTER — APPOINTMENT (OUTPATIENT)
Dept: RADIOLOGY | Facility: IMAGING CENTER | Age: 22
End: 2017-12-20
Attending: OBSTETRICS & GYNECOLOGY
Payer: MEDICAID

## 2017-12-20 DIAGNOSIS — Z34.02 ENCOUNTER FOR SUPERVISION OF NORMAL FIRST PREGNANCY IN SECOND TRIMESTER: ICD-10-CM

## 2017-12-20 PROCEDURE — 76815 OB US LIMITED FETUS(S): CPT | Mod: TC | Performed by: OBSTETRICS & GYNECOLOGY

## 2018-01-08 ENCOUNTER — ROUTINE PRENATAL (OUTPATIENT)
Dept: OBGYN | Facility: CLINIC | Age: 23
End: 2018-01-08
Payer: MEDICAID

## 2018-01-08 VITALS — WEIGHT: 158 LBS | SYSTOLIC BLOOD PRESSURE: 106 MMHG | DIASTOLIC BLOOD PRESSURE: 60 MMHG | BODY MASS INDEX: 27.12 KG/M2

## 2018-01-08 DIAGNOSIS — Z34.02 ENCOUNTER FOR SUPERVISION OF NORMAL FIRST PREGNANCY IN SECOND TRIMESTER: Primary | ICD-10-CM

## 2018-01-08 DIAGNOSIS — R87.612 LGSIL ON PAP SMEAR OF CERVIX: ICD-10-CM

## 2018-01-08 PROCEDURE — 90715 TDAP VACCINE 7 YRS/> IM: CPT | Performed by: NURSE PRACTITIONER

## 2018-01-08 PROCEDURE — 90471 IMMUNIZATION ADMIN: CPT | Performed by: NURSE PRACTITIONER

## 2018-01-08 PROCEDURE — 90040 PR PRENATAL FOLLOW UP: CPT | Performed by: NURSE PRACTITIONER

## 2018-01-08 NOTE — PROGRESS NOTES
S:  Pt is  at 27w0d for routine OB follow up.  No concerns today.  Reports good FM.  Denies VB, LOF, RUCs or vaginal DC.    O:  Please see above vitals.        FHTs: 155        Fundal ht: 24 cm.        S<D-due to transverse lie          Limited OB US - Single intrauterine pregnancy of an estimated gestational age of 23 weeks, 1 day with an estimated date of delivery of 2018. There is a question of some increased bowel echogenicity persisting. The other structures previously not well seen are visualized and within normal limits.:     A:  IUP at 27w0d  Patient Active Problem List    Diagnosis Date Noted   • Supervision of normal first pregnancy 10/25/2017     Priority: Medium   • LGSIL on Pap smear of cervix 2017        P:  1.  Declines BTL.          2.  Instructions given on FKCs.          3.  Questions answered.          4.  Encouraged pt to tour L&D.          5.  Encourage adequate water intake.        6.   labor precautions reviewed.         7.  F/u 2 wks.        8.  TDap today.

## 2018-01-08 NOTE — LETTER
"Count Your Baby's Movements  Another step to a healthy delivery    Debi Juarez             Dept: [phone]    How Many Weeks Pregnant? 27W0D    Date to Begin Countin/15/2018              How to use this chart    One way for your physician to keep track of your baby's health is by knowing how often the baby moves (or \"kicks\") in your womb.  You can help your physician to do this by using this chart every day.    Every day, you should see how many hours it takes for your baby to move 10 times.  Start in the morning, as soon as you get up.    · First, write down the time your baby moves until you get to 10.  · Check off one box every time your baby moves until you get to 10.  · Write down the time you finished counting in the last column.  · Total how long it took to count up all 10 movements.  · Finally, fill in the box that shows how long this took.  After counting 10 movements, you no longer have to count any more that day.  The next morning, just start counting again as soon as you get up.    What should you call a \"movement\"?  It is hard to say, because it will feel different from one mother to another and from one pregnancy to the next.  The important thing is that you count the movements the same way throughout your pregnancy.  If you have more questions, you should ask your physician.    Count carefully every day!  SAMPLE:  Week 28    How many hours did it take to feel 10 movements?       Start  Time     1     2     3     4     5     6     7     8     9     10   Finish Time   Mon 8:20 ·  ·  ·  ·  ·  ·  ·  ·  ·  ·  11:40   Tue               Wed               Thu               Fri               Sat               Sun                 IMPORTANT: You should contact your physician if it takes more than two hours for you to feel 10 movements.  Each morning, write down the time and start to count the movements of your baby.  Keep track by checking off one box every time you feel one movement.  When " "you have felt 10 \"kicks\", write down the time you finished counting in the last column.  Then fill in the   box (over the check kirstin) for the number of hours it took.  Be sure to read the complete instructions on the previous page.            "

## 2018-01-08 NOTE — PROGRESS NOTES
Pt here today for OB follow up  Reports +FM  WT: 158 lb  BP: 106/60  Pt had repeat US   ZEFERINO sheet given and explained today   1 hr gtt, H/H, and T.PAllidum lab slip given today with instructions  Desires Tdap vaccine  Declines BTL  Pt states no complaints today  Good # [phone]    Tdap vaccine given. Right Deltoid. VIS given and screening check list reviewed with pt.

## 2018-01-22 ENCOUNTER — ROUTINE PRENATAL (OUTPATIENT)
Dept: OBGYN | Facility: CLINIC | Age: 23
End: 2018-01-22
Payer: MEDICAID

## 2018-01-22 VITALS — SYSTOLIC BLOOD PRESSURE: 100 MMHG | BODY MASS INDEX: 27.81 KG/M2 | WEIGHT: 162 LBS | DIASTOLIC BLOOD PRESSURE: 60 MMHG

## 2018-01-22 DIAGNOSIS — R87.612 LGSIL ON PAP SMEAR OF CERVIX: ICD-10-CM

## 2018-01-22 DIAGNOSIS — Z34.02 ENCOUNTER FOR SUPERVISION OF NORMAL FIRST PREGNANCY IN SECOND TRIMESTER: ICD-10-CM

## 2018-01-22 PROCEDURE — 90040 PR PRENATAL FOLLOW UP: CPT | Performed by: NURSE PRACTITIONER

## 2018-01-22 ASSESSMENT — PATIENT HEALTH QUESTIONNAIRE - PHQ9: CLINICAL INTERPRETATION OF PHQ2 SCORE: 0

## 2018-01-22 NOTE — PROGRESS NOTES
Ob f/u. + fetal movement good  No VB, LOF or contractions   C/O no complaints today  Phone number # [phone]  Pharmacy verified with patient  WT=   162 lbs           ZK=892/60  Labs not done yet. Slip printed today, given to pt along with instructions

## 2018-01-22 NOTE — PROGRESS NOTES
SUBJECTIVE:  Pt is a 22 y.o.   at 29w0d  gestation. Presents today for follow-up prenatal care. Reports no issues at this time.  Reports good fetal movement. Denies cramping/contractions, bleeding or leaking of fluid. Denies dysuria, headaches, N/V, or other issues at this time. Generally feels well today.     OBJECTIVE:  - See prenatal vitals flow  -   Vitals:    18 1429   BP: 100/60   Weight: 73.5 kg (162 lb)                 ASSESSMENT:   - IUP at 29w0d by 11 week US   - S<D     Patient Active Problem List    Diagnosis Date Noted   • Supervision of normal first pregnancy 10/25/2017     Priority: Medium   • LGSIL on Pap smear of cervix 2017         PLAN:  - TO do GCT stat   - S/sx pregnancy and labor warning signs vs general discomforts discussed  - Fetal movements and kick counts reviewed   - Adequate hydration reinforced  - Nutrition/exercise/vitamin education; continued PNV  - Encouraged tour of LnD/childbirth education classes: contact info provided   - S/p TDAP vacc  - S/p Flu vacc  - Anticipatory guidance given  - RTC in 2 weeks for follow-up prenatal care; recheck fundal height at that time

## 2018-02-03 LAB
GLUCOSE 1H P 50 G GLC PO SERPL-MCNC: NORMAL MG/DL
HCT VFR BLD AUTO: NORMAL %
HGB BLD-MCNC: NORMAL G/DL
TREPONEMA PALLIDUM IGG+IGM AB [PRESENCE] IN SERUM OR PLASMA BY IMMUNOASSAY: NOT DETECTED

## 2018-02-05 ENCOUNTER — ROUTINE PRENATAL (OUTPATIENT)
Dept: OBGYN | Facility: CLINIC | Age: 23
End: 2018-02-05
Payer: MEDICAID

## 2018-02-05 VITALS — BODY MASS INDEX: 28.67 KG/M2 | DIASTOLIC BLOOD PRESSURE: 68 MMHG | WEIGHT: 167 LBS | SYSTOLIC BLOOD PRESSURE: 120 MMHG

## 2018-02-05 DIAGNOSIS — Z34.03 ENCOUNTER FOR SUPERVISION OF NORMAL FIRST PREGNANCY IN THIRD TRIMESTER: ICD-10-CM

## 2018-02-05 DIAGNOSIS — R87.612 LGSIL ON PAP SMEAR OF CERVIX: ICD-10-CM

## 2018-02-05 PROCEDURE — 90040 PR PRENATAL FOLLOW UP: CPT | Performed by: PHYSICIAN ASSISTANT

## 2018-02-05 NOTE — PROGRESS NOTES
Pt has no complaints with cramping, UCs, Vb, LOF, though pt has had incr low back pain. +FM. Pt did 1hr GTT last week, so will request records from [facility]. Fundal height 5cm less than dates, but consistent growth since last visit. US on 12/20 showed appropriate growth even with small fundal height. Fetus in transverse position, likely contributing to S<D, so if size still less than dates next visit, will get US for growth then. RTC 2 wk or sooner prn.

## 2018-02-05 NOTE — PROGRESS NOTES
Pt here today for OB follow up  Reports +FM  WT: 167 lb   BP: 120/68  Pt states no complaints today  Pt states she had blood work done for the 1 hr gtt done last week on wed.   [facility] # [phone]

## 2018-02-22 ENCOUNTER — ROUTINE PRENATAL (OUTPATIENT)
Dept: OBGYN | Facility: CLINIC | Age: 23
End: 2018-02-22
Payer: MEDICAID

## 2018-02-22 VITALS — SYSTOLIC BLOOD PRESSURE: 116 MMHG | BODY MASS INDEX: 28.67 KG/M2 | DIASTOLIC BLOOD PRESSURE: 64 MMHG | WEIGHT: 167 LBS

## 2018-02-22 DIAGNOSIS — O26.893 HEARTBURN IN PREGNANCY IN THIRD TRIMESTER: ICD-10-CM

## 2018-02-22 DIAGNOSIS — O26.843 UTERINE SIZE DATE DISCREPANCY PREGNANCY, THIRD TRIMESTER: ICD-10-CM

## 2018-02-22 DIAGNOSIS — Z34.03 ENCOUNTER FOR SUPERVISION OF NORMAL FIRST PREGNANCY IN THIRD TRIMESTER: Primary | ICD-10-CM

## 2018-02-22 DIAGNOSIS — R12 HEARTBURN IN PREGNANCY IN THIRD TRIMESTER: ICD-10-CM

## 2018-02-22 DIAGNOSIS — R87.612 LGSIL ON PAP SMEAR OF CERVIX: ICD-10-CM

## 2018-02-22 DIAGNOSIS — O99.013 ANEMIA IN PREGNANCY, THIRD TRIMESTER: ICD-10-CM

## 2018-02-22 PROCEDURE — 90040 PR PRENATAL FOLLOW UP: CPT | Performed by: PHYSICIAN ASSISTANT

## 2018-02-22 RX ORDER — FERROUS SULFATE 325(65) MG
325 TABLET ORAL DAILY
Qty: 30 TAB | Refills: 3 | Status: ON HOLD | OUTPATIENT
Start: 2018-02-22 | End: 2018-03-08

## 2018-02-22 RX ORDER — RANITIDINE 150 MG/1
150 TABLET ORAL 2 TIMES DAILY
Qty: 60 TAB | Refills: 3 | Status: ON HOLD | OUTPATIENT
Start: 2018-02-22 | End: 2018-03-08

## 2018-02-22 NOTE — PROGRESS NOTES
Pt has no complaints with cramping, UCs, Vb, LOF. +FM. Pt has had worsening heartburn, so Zantac rx called in today. Fundal height very small for dates - will get US asap for growth, STEPHANIE check. 1hr GTT wnl, RPR wnl, but H/H low, 10.4/32.4, so pt states she is taking PNV, but they are gummies, so pt strongly encouraged to get iron supplements. Rx called in today. GBS next visit. RTC 2 wk or sooner prn.

## 2018-02-22 NOTE — PROGRESS NOTES
OB f/u. + fetal movement.  No VB, LOF or UC's.  Good phone # [phone]  Preferred pharmacy confirmed  Pt denies any problems at this time

## 2018-02-23 ENCOUNTER — TELEPHONE (OUTPATIENT)
Dept: OBGYN | Facility: CLINIC | Age: 23
End: 2018-02-23

## 2018-02-23 ENCOUNTER — HOSPITAL ENCOUNTER (OUTPATIENT)
Dept: RADIOLOGY | Facility: MEDICAL CENTER | Age: 23
End: 2018-02-23
Attending: PHYSICIAN ASSISTANT
Payer: MEDICAID

## 2018-02-23 DIAGNOSIS — O26.843 UTERINE SIZE DATE DISCREPANCY PREGNANCY, THIRD TRIMESTER: ICD-10-CM

## 2018-02-23 PROCEDURE — 76816 OB US FOLLOW-UP PER FETUS: CPT

## 2018-02-23 NOTE — LETTER
February 23, 2018          Dear Debi Juarez,      Please call us regarding your care.  One of the nurses is available to speak with you Monday through Friday, 8 a.m. to 5 p.m.    Please call us at [phone]; thank you for your prompt attention.        Sincerely,    Dee Bynum R.N.    Electronically Signed

## 2018-02-23 NOTE — TELEPHONE ENCOUNTER
"----- Message from Juana Zarate M.D. sent at 2/23/2018  3:28 PM PST -----  Severe IUGR with low STEPHANIE please send patient to labor and delivery for NST possible inpatient consultation from perinatology      2/23/18 1529 tried contacted pt by her contact information of [phone], tried contacted Pt's father Misael Lopez as he is listed as Emergency contact phone# [phone] and both said are no longer in service, also tried Pt's listed employer [employer] phone# [phone] and it said \"the call cannot be complete as dial, please try again later.\" Even Angela Mcdonald MA also tried to dial numbers from a different phone and same answers received. Per KANDY Cunningham CNM she called US office at [facility] and same number that we have in file for pt gave US office at time of registration. Pt declined My chart.  My manager Mary Anne Rivas helped Contact [employer] supervisor Ron. Ron stated she had pt working out of the [facility] [employer] but pt did a no call no show last week and pt has not been to work since and the only contact information ron has is the same phone# we have on file for pt. Ron stated she does not have any emergency contact information because she already deleted pt out of her system. Will send letter today. FYI in chart.          "

## 2018-03-08 ENCOUNTER — APPOINTMENT (OUTPATIENT)
Dept: RADIOLOGY | Facility: MEDICAL CENTER | Age: 23
End: 2018-03-08
Attending: OBSTETRICS & GYNECOLOGY
Payer: MEDICAID

## 2018-03-08 ENCOUNTER — ROUTINE PRENATAL (OUTPATIENT)
Dept: OBGYN | Facility: CLINIC | Age: 23
End: 2018-03-08
Payer: MEDICAID

## 2018-03-08 ENCOUNTER — HOSPITAL ENCOUNTER (INPATIENT)
Facility: MEDICAL CENTER | Age: 23
LOS: 4 days | End: 2018-03-12
Attending: OBSTETRICS & GYNECOLOGY | Admitting: OBSTETRICS & GYNECOLOGY
Payer: MEDICAID

## 2018-03-08 VITALS — BODY MASS INDEX: 29.35 KG/M2 | DIASTOLIC BLOOD PRESSURE: 70 MMHG | SYSTOLIC BLOOD PRESSURE: 112 MMHG | WEIGHT: 171 LBS

## 2018-03-08 DIAGNOSIS — Z34.03 ENCOUNTER FOR SUPERVISION OF NORMAL FIRST PREGNANCY IN THIRD TRIMESTER: ICD-10-CM

## 2018-03-08 DIAGNOSIS — O99.013 ANEMIA IN PREGNANCY, THIRD TRIMESTER: ICD-10-CM

## 2018-03-08 DIAGNOSIS — Z98.890 STATUS POST SURGERY: ICD-10-CM

## 2018-03-08 DIAGNOSIS — R87.612 LGSIL ON PAP SMEAR OF CERVIX: ICD-10-CM

## 2018-03-08 LAB
BASOPHILS # BLD AUTO: 0.3 % (ref 0–1.8)
BASOPHILS # BLD: 0.03 K/UL (ref 0–0.12)
EOSINOPHIL # BLD AUTO: 0.04 K/UL (ref 0–0.51)
EOSINOPHIL NFR BLD: 0.4 % (ref 0–6.9)
ERYTHROCYTE [DISTWIDTH] IN BLOOD BY AUTOMATED COUNT: 53.1 FL (ref 35.9–50)
HCT VFR BLD AUTO: 30.9 % (ref 37–47)
HGB BLD-MCNC: 9.8 G/DL (ref 12–16)
HOLDING TUBE BB 8507: NORMAL
IMM GRANULOCYTES # BLD AUTO: 0.1 K/UL (ref 0–0.11)
IMM GRANULOCYTES NFR BLD AUTO: 1 % (ref 0–0.9)
LYMPHOCYTES # BLD AUTO: 2.29 K/UL (ref 1–4.8)
LYMPHOCYTES NFR BLD: 22.9 % (ref 22–41)
MCH RBC QN AUTO: 26.6 PG (ref 27–33)
MCHC RBC AUTO-ENTMCNC: 31.7 G/DL (ref 33.6–35)
MCV RBC AUTO: 83.7 FL (ref 81.4–97.8)
MONOCYTES # BLD AUTO: 0.7 K/UL (ref 0–0.85)
MONOCYTES NFR BLD AUTO: 7 % (ref 0–13.4)
NEUTROPHILS # BLD AUTO: 6.84 K/UL (ref 2–7.15)
NEUTROPHILS NFR BLD: 68.4 % (ref 44–72)
NRBC # BLD AUTO: 0 K/UL
NRBC BLD-RTO: 0 /100 WBC
PLATELET # BLD AUTO: 249 K/UL (ref 164–446)
PMV BLD AUTO: 10.5 FL (ref 9–12.9)
RBC # BLD AUTO: 3.69 M/UL (ref 4.2–5.4)
WBC # BLD AUTO: 10 K/UL (ref 4.8–10.8)

## 2018-03-08 PROCEDURE — 59514 CESAREAN DELIVERY ONLY: CPT

## 2018-03-08 PROCEDURE — 700111 HCHG RX REV CODE 636 W/ 250 OVERRIDE (IP): Performed by: OBSTETRICS & GYNECOLOGY

## 2018-03-08 PROCEDURE — 4A1HXCZ MONITORING OF PRODUCTS OF CONCEPTION, CARDIAC RATE, EXTERNAL APPROACH: ICD-10-PCS | Performed by: OBSTETRICS & GYNECOLOGY

## 2018-03-08 PROCEDURE — 304964 HCHG RECOVERY ROOM TIME 1HR: Performed by: OBSTETRICS & GYNECOLOGY

## 2018-03-08 PROCEDURE — 700105 HCHG RX REV CODE 258: Performed by: OBSTETRICS & GYNECOLOGY

## 2018-03-08 PROCEDURE — 36415 COLL VENOUS BLD VENIPUNCTURE: CPT

## 2018-03-08 PROCEDURE — 305385 HCHG SURGICAL SERVICES 1/4 HOUR: Performed by: OBSTETRICS & GYNECOLOGY

## 2018-03-08 PROCEDURE — 770002 HCHG ROOM/CARE - OB PRIVATE (112)

## 2018-03-08 PROCEDURE — 700111 HCHG RX REV CODE 636 W/ 250 OVERRIDE (IP)

## 2018-03-08 PROCEDURE — 90040 PR PRENATAL FOLLOW UP: CPT | Performed by: NURSE PRACTITIONER

## 2018-03-08 PROCEDURE — 88307 TISSUE EXAM BY PATHOLOGIST: CPT

## 2018-03-08 PROCEDURE — 304966 HCHG RECOVERY SVSC TIME ADDL 1/2 HR: Performed by: OBSTETRICS & GYNECOLOGY

## 2018-03-08 PROCEDURE — 700102 HCHG RX REV CODE 250 W/ 637 OVERRIDE(OP)

## 2018-03-08 PROCEDURE — 85025 COMPLETE CBC W/AUTO DIFF WBC: CPT

## 2018-03-08 PROCEDURE — 306828 HCHG ANES-TIME GENERAL: Performed by: OBSTETRICS & GYNECOLOGY

## 2018-03-08 RX ORDER — VITAMIN A ACETATE, BETA CAROTENE, ASCORBIC ACID, CHOLECALCIFEROL, .ALPHA.-TOCOPHEROL ACETATE, DL-, THIAMINE MONONITRATE, RIBOFLAVIN, NIACINAMIDE, PYRIDOXINE HYDROCHLORIDE, FOLIC ACID, CYANOCOBALAMIN, CALCIUM CARBONATE, FERROUS FUMARATE, ZINC OXIDE, CUPRIC OXIDE 3080; 12; 120; 400; 1; 1.84; 3; 20; 22; 920; 25; 200; 27; 10; 2 [IU]/1; UG/1; MG/1; [IU]/1; MG/1; MG/1; MG/1; MG/1; MG/1; [IU]/1; MG/1; MG/1; MG/1; MG/1; MG/1
1 TABLET, FILM COATED ORAL EVERY MORNING
Status: CANCELLED | OUTPATIENT
Start: 2018-03-08

## 2018-03-08 RX ORDER — OXYCODONE AND ACETAMINOPHEN 10; 325 MG/1; MG/1
1 TABLET ORAL EVERY 4 HOURS PRN
Status: CANCELLED | OUTPATIENT
Start: 2018-03-08

## 2018-03-08 RX ORDER — MISOPROSTOL 200 UG/1
600 TABLET ORAL
Status: CANCELLED | OUTPATIENT
Start: 2018-03-08

## 2018-03-08 RX ORDER — SODIUM CHLORIDE, SODIUM LACTATE, POTASSIUM CHLORIDE, CALCIUM CHLORIDE 600; 310; 30; 20 MG/100ML; MG/100ML; MG/100ML; MG/100ML
INJECTION, SOLUTION INTRAVENOUS PRN
Status: CANCELLED | OUTPATIENT
Start: 2018-03-08

## 2018-03-08 RX ORDER — DOCUSATE SODIUM 100 MG/1
100 CAPSULE, LIQUID FILLED ORAL 2 TIMES DAILY PRN
Status: DISCONTINUED | OUTPATIENT
Start: 2018-03-08 | End: 2018-03-12 | Stop reason: HOSPADM

## 2018-03-08 RX ORDER — SODIUM CHLORIDE, SODIUM LACTATE, POTASSIUM CHLORIDE, CALCIUM CHLORIDE 600; 310; 30; 20 MG/100ML; MG/100ML; MG/100ML; MG/100ML
INJECTION, SOLUTION INTRAVENOUS CONTINUOUS
Status: DISCONTINUED | OUTPATIENT
Start: 2018-03-08 | End: 2018-03-08 | Stop reason: HOSPADM

## 2018-03-08 RX ORDER — METOCLOPRAMIDE HYDROCHLORIDE 5 MG/ML
INJECTION INTRAMUSCULAR; INTRAVENOUS
Status: COMPLETED
Start: 2018-03-08 | End: 2018-03-08

## 2018-03-08 RX ORDER — NALOXONE HYDROCHLORIDE 0.4 MG/ML
0.1 INJECTION, SOLUTION INTRAMUSCULAR; INTRAVENOUS; SUBCUTANEOUS PRN
Status: DISCONTINUED | OUTPATIENT
Start: 2018-03-08 | End: 2018-03-09

## 2018-03-08 RX ORDER — SODIUM CHLORIDE, SODIUM LACTATE, POTASSIUM CHLORIDE, CALCIUM CHLORIDE 600; 310; 30; 20 MG/100ML; MG/100ML; MG/100ML; MG/100ML
INJECTION, SOLUTION INTRAVENOUS PRN
Status: DISCONTINUED | OUTPATIENT
Start: 2018-03-08 | End: 2018-03-12 | Stop reason: HOSPADM

## 2018-03-08 RX ORDER — MISOPROSTOL 200 UG/1
600 TABLET ORAL
Status: DISCONTINUED | OUTPATIENT
Start: 2018-03-08 | End: 2018-03-12 | Stop reason: HOSPADM

## 2018-03-08 RX ORDER — HYDROMORPHONE HYDROCHLORIDE 2 MG/ML
0.4 INJECTION, SOLUTION INTRAMUSCULAR; INTRAVENOUS; SUBCUTANEOUS
Status: DISCONTINUED | OUTPATIENT
Start: 2018-03-08 | End: 2018-03-09

## 2018-03-08 RX ORDER — METHYLERGONOVINE MALEATE 0.2 MG/ML
0.2 INJECTION INTRAVENOUS
Status: CANCELLED | OUTPATIENT
Start: 2018-03-08

## 2018-03-08 RX ORDER — SIMETHICONE 80 MG
80 TABLET,CHEWABLE ORAL 4 TIMES DAILY PRN
Status: DISCONTINUED | OUTPATIENT
Start: 2018-03-08 | End: 2018-03-12 | Stop reason: HOSPADM

## 2018-03-08 RX ORDER — BISACODYL 10 MG
10 SUPPOSITORY, RECTAL RECTAL PRN
Status: CANCELLED | OUTPATIENT
Start: 2018-03-08

## 2018-03-08 RX ORDER — ACETAMINOPHEN 325 MG/1
325 TABLET ORAL EVERY 4 HOURS PRN
Status: CANCELLED | OUTPATIENT
Start: 2018-03-08

## 2018-03-08 RX ORDER — OXYCODONE HYDROCHLORIDE AND ACETAMINOPHEN 5; 325 MG/1; MG/1
1 TABLET ORAL EVERY 4 HOURS PRN
Status: DISCONTINUED | OUTPATIENT
Start: 2018-03-08 | End: 2018-03-09

## 2018-03-08 RX ORDER — METOCLOPRAMIDE HYDROCHLORIDE 5 MG/ML
10 INJECTION INTRAMUSCULAR; INTRAVENOUS EVERY 6 HOURS PRN
Status: DISCONTINUED | OUTPATIENT
Start: 2018-03-08 | End: 2018-03-09

## 2018-03-08 RX ORDER — MISOPROSTOL 200 UG/1
800 TABLET ORAL
Status: DISCONTINUED | OUTPATIENT
Start: 2018-03-08 | End: 2018-03-08 | Stop reason: HOSPADM

## 2018-03-08 RX ORDER — VITAMIN A ACETATE, BETA CAROTENE, ASCORBIC ACID, CHOLECALCIFEROL, .ALPHA.-TOCOPHEROL ACETATE, DL-, THIAMINE MONONITRATE, RIBOFLAVIN, NIACINAMIDE, PYRIDOXINE HYDROCHLORIDE, FOLIC ACID, CYANOCOBALAMIN, CALCIUM CARBONATE, FERROUS FUMARATE, ZINC OXIDE, CUPRIC OXIDE 3080; 12; 120; 400; 1; 1.84; 3; 20; 22; 920; 25; 200; 27; 10; 2 [IU]/1; UG/1; MG/1; [IU]/1; MG/1; MG/1; MG/1; MG/1; MG/1; [IU]/1; MG/1; MG/1; MG/1; MG/1; MG/1
1 TABLET, FILM COATED ORAL EVERY MORNING
Status: DISCONTINUED | OUTPATIENT
Start: 2018-03-09 | End: 2018-03-12 | Stop reason: HOSPADM

## 2018-03-08 RX ORDER — MAG HYDROX/ALUMINUM HYD/SIMETH 400-400-40
1 SUSPENSION, ORAL (FINAL DOSE FORM) ORAL
Status: CANCELLED | OUTPATIENT
Start: 2018-03-08

## 2018-03-08 RX ORDER — METHYLERGONOVINE MALEATE 0.2 MG/ML
0.2 INJECTION INTRAVENOUS
Status: DISCONTINUED | OUTPATIENT
Start: 2018-03-08 | End: 2018-03-08 | Stop reason: HOSPADM

## 2018-03-08 RX ORDER — SIMETHICONE 80 MG
80 TABLET,CHEWABLE ORAL 4 TIMES DAILY PRN
Status: CANCELLED | OUTPATIENT
Start: 2018-03-08

## 2018-03-08 RX ORDER — OXYCODONE HYDROCHLORIDE AND ACETAMINOPHEN 5; 325 MG/1; MG/1
1 TABLET ORAL EVERY 4 HOURS PRN
Status: CANCELLED | OUTPATIENT
Start: 2018-03-08

## 2018-03-08 RX ORDER — IBUPROFEN 600 MG/1
600 TABLET ORAL EVERY 6 HOURS PRN
Status: CANCELLED | OUTPATIENT
Start: 2018-03-08

## 2018-03-08 RX ORDER — OXYCODONE AND ACETAMINOPHEN 10; 325 MG/1; MG/1
1 TABLET ORAL EVERY 4 HOURS PRN
Status: DISCONTINUED | OUTPATIENT
Start: 2018-03-08 | End: 2018-03-09

## 2018-03-08 RX ORDER — CEFAZOLIN SODIUM 1 G/3ML
1 INJECTION, POWDER, FOR SOLUTION INTRAMUSCULAR; INTRAVENOUS ONCE
Status: DISCONTINUED | OUTPATIENT
Start: 2018-03-08 | End: 2018-03-08 | Stop reason: HOSPADM

## 2018-03-08 RX ORDER — DIPHENHYDRAMINE HYDROCHLORIDE 50 MG/ML
25 INJECTION INTRAMUSCULAR; INTRAVENOUS EVERY 6 HOURS PRN
Status: DISCONTINUED | OUTPATIENT
Start: 2018-03-08 | End: 2018-03-09

## 2018-03-08 RX ORDER — ONDANSETRON 2 MG/ML
4 INJECTION INTRAMUSCULAR; INTRAVENOUS EVERY 6 HOURS PRN
Status: DISCONTINUED | OUTPATIENT
Start: 2018-03-08 | End: 2018-03-09

## 2018-03-08 RX ORDER — DIPHENHYDRAMINE HYDROCHLORIDE 50 MG/ML
12.5 INJECTION INTRAMUSCULAR; INTRAVENOUS EVERY 6 HOURS PRN
Status: DISCONTINUED | OUTPATIENT
Start: 2018-03-08 | End: 2018-03-09

## 2018-03-08 RX ORDER — CITRIC ACID/SODIUM CITRATE 334-500MG
SOLUTION, ORAL ORAL
Status: COMPLETED
Start: 2018-03-08 | End: 2018-03-08

## 2018-03-08 RX ORDER — DOCUSATE SODIUM 100 MG/1
100 CAPSULE, LIQUID FILLED ORAL 2 TIMES DAILY PRN
Status: CANCELLED | OUTPATIENT
Start: 2018-03-08

## 2018-03-08 RX ORDER — HYDROMORPHONE HYDROCHLORIDE 2 MG/ML
0.2 INJECTION, SOLUTION INTRAMUSCULAR; INTRAVENOUS; SUBCUTANEOUS
Status: DISCONTINUED | OUTPATIENT
Start: 2018-03-08 | End: 2018-03-09

## 2018-03-08 RX ORDER — ONDANSETRON 2 MG/ML
4 INJECTION INTRAMUSCULAR; INTRAVENOUS EVERY 6 HOURS PRN
Status: CANCELLED | OUTPATIENT
Start: 2018-03-08

## 2018-03-08 RX ORDER — METHYLERGONOVINE MALEATE 0.2 MG/ML
0.2 INJECTION INTRAVENOUS
Status: DISCONTINUED | OUTPATIENT
Start: 2018-03-08 | End: 2018-03-12 | Stop reason: HOSPADM

## 2018-03-08 RX ORDER — ONDANSETRON 4 MG/1
4 TABLET, ORALLY DISINTEGRATING ORAL EVERY 6 HOURS PRN
Status: CANCELLED | OUTPATIENT
Start: 2018-03-08

## 2018-03-08 RX ORDER — KETOROLAC TROMETHAMINE 30 MG/ML
30 INJECTION, SOLUTION INTRAMUSCULAR; INTRAVENOUS EVERY 6 HOURS
Status: DISCONTINUED | OUTPATIENT
Start: 2018-03-09 | End: 2018-03-09

## 2018-03-08 RX ADMIN — Medication 125 ML/HR: at 19:55

## 2018-03-08 RX ADMIN — SODIUM CITRATE AND CITRIC ACID MONOHYDRATE 30 ML: 500; 334 SOLUTION ORAL at 16:31

## 2018-03-08 RX ADMIN — METOCLOPRAMIDE 10 MG: 5 INJECTION, SOLUTION INTRAMUSCULAR; INTRAVENOUS at 16:29

## 2018-03-08 RX ADMIN — SODIUM CHLORIDE, POTASSIUM CHLORIDE, SODIUM LACTATE AND CALCIUM CHLORIDE: 600; 310; 30; 20 INJECTION, SOLUTION INTRAVENOUS at 16:35

## 2018-03-08 RX ADMIN — FAMOTIDINE 20 MG: 10 INJECTION INTRAVENOUS at 16:31

## 2018-03-08 ASSESSMENT — LIFESTYLE VARIABLES
EVER_SMOKED: NEVER
DO YOU DRINK ALCOHOL: NO
DO YOU DRINK ALCOHOL: NO
ALCOHOL_USE: NO

## 2018-03-08 ASSESSMENT — PATIENT HEALTH QUESTIONNAIRE - PHQ9
SUM OF ALL RESPONSES TO PHQ QUESTIONS 1-9: 0
CLINICAL INTERPRETATION OF PHQ2 SCORE: 0
1. LITTLE INTEREST OR PLEASURE IN DOING THINGS: NOT AT ALL
SUM OF ALL RESPONSES TO PHQ9 QUESTIONS 1 AND 2: 0
2. FEELING DOWN, DEPRESSED, IRRITABLE, OR HOPELESS: NOT AT ALL

## 2018-03-08 ASSESSMENT — PAIN SCALES - GENERAL: PAINLEVEL_OUTOF10: 0

## 2018-03-08 NOTE — PROGRESS NOTES
"[facility] LABOR AND DELIVERY TRIAGE PROGRESS NOTE    PATIENT ID:  NAME:  Debi Juarez  MRN:               [id number]  YOB: 1995     22 y.o. female  at 35w3d.    Subjective: Pt sent over from clinic due to U/S results showing growth lag, decreased STEPHANIE, placental calcifications, and increased s/d ratios, needs further U/S work-up and POC.  Pregnancy complicated by LGSIL, anemia, U/S abnormalities as above.    negative  For CTXS.   negative Feels pain   negative for LOF  negative for vaginal bleeding.   positive for fetal movement    ROS: Patient denies any fever chills, nausea, vomiting, headache, chest pain, shortness of breath, or dysuria or unusual swelling of hands or feet.     Objective:    Vitals:    18 1443 18 1454   BP:  123/84   Pulse:  85   Temp: 37.3 °C (99.1 °F)    Weight: 77.6 kg (171 lb 1.2 oz)    Height: 1.626 m (5' 4\")      Temp (24hrs), Av.3 °C (99.1 °F), Min:37.3 °C (99.1 °F), Max:37.3 °C (99.1 °F)    General: No acute distress, resting comfortably in bed.  HEENT: normocephalic, nontraumatic, PERRLA, EOMI  Cardiovascular: Heart RRR with no murmurs, rubs or gallops. Distal Pulses 2+  Respiratory: symmetric chest expansion, lungs CTAB, with no wheezes, rales, rhonci  Abdomen: gravid, nontender  Musculoskeletal: strength 5/5 in four extremities  Neuro: non focal with no numbness, tingling or changes in sensation    Cervix:  deferred  [place]: No Uterine Contractions.   FHRM: Baseline 160s, No Accels, intermittent variables, moderate variability    Assessment: 22 y.o. female  at 35w3d.    Plan:   1. Tracing is not reactive. U/S ordered for growth and STEPHANIE and dopplers. Reassess following U/S.     Discussed case with Dr. Hill, [facility] Attending. Case was discussed and attending agreed with plan.    Sara Hooper M.D.  "

## 2018-03-08 NOTE — PROGRESS NOTES
22 y.o.    Edc=  35.3      Late Entry    Pt presents to triage after being sent over from [facility] for follow up US due to IUGR and low STEPHANIE.  KANDY Morin phoned me with brief report and order for monitoring and US.  Pt brought to LDA 5, pt care assumed by Jacquelin VALERA.  1412-I assumed pt care, US order in place by Jacquelin VALERA.  0184-Dr. Hill phoned report given about FHTs (minimal to absent variabiity, recent variable), I requested him to review strip and orders to start IV.  Orders for IV and US at BS.  2315-Dr. Hill at , US done, no fluid seen.  Dr. Hill reviewed strip and called a primary c/s at 1600 for fetal distress, stating to be ready within the hour.  Charge RN, phoned and updated on poc.  Jacquelin RN assumed pt care for preoperative care and  section.

## 2018-03-08 NOTE — PROGRESS NOTES
S) Pt is a 22 y.o.   at 35w3d  gestation. Routine prenatal care today. No complaints today. Patient had an US on 18 and multiple red flags were noted. Was not able to contact patient with any of the information on her file. She was not told of these results. Discussed US results with patient including noted growth lag, decreased STEPHANIE, placental calcifications, and increased s/d ratios. Notes on US were to sent patient to L&D immediately for further evaluation. Spoke to MD in clinic today, and will sent patient to hospital for another repeat US and further workup and POC. GBS collected today. Labor precautions discussed. To L&D now, MD and triage RN aware of patients impending arrival.   Fetal movement Normal  Cramping no  VB no  LOF no   Denies dysuria. Generally feels well today. Good self-care activities identified. Denies headaches, swelling, visual changes, or epigastric pain .     O) Blood pressure 112/70, weight 77.6 kg (171 lb), last menstrual period 2017.        Labs:       PNL: WNL       GCT: 126       AFP: normal       GBS: collected today       Pertinent ultrasound -        17- Survey with ?echogenic bowel, and poor visualization of multiple features, STEPHANIE 13.36cm, c/w prev dating. Needs follow up scan in 4 weeks to reevaluate structures.  17- Survey WNL except for bowel echogenicity again noted, STEPHANIE 13.52cm, c/w prev dating.   18- S<D US- Survey shows infant with approx 5 week growth lag, STEPHANIE 7.7cm, placental calcifications, and increased umbilical artery SD ratios. Mention marginal cord insertion.  MD recommended immediate L&D follow up, but patient was not able to be contacted, and therefore never followed up.  She is going to hospital now for evaluation.    A) IUP at 35w3d       S<D         Patient Active Problem List    Diagnosis Date Noted   • Supervision of normal first pregnancy 10/25/2017     Priority: Medium   • Uterine size date discrepancy pregnancy - S<D, third  trimester 2018   • Anemia in pregnancy - 10.4/32.4 on 2018   • LGSIL on Pap smear of cervix 2017          SVE: not tolerated         TDAP: yes       FLU: yes        BTL: no       : n/a       C/S Consent: n/a       IOL or C/S scheduled: no       LAST PAP: 10/25/17- LSIL, colpo done, needs repeat colpo with bx after pregnancy         P) s/s ptl vs general discomforts. Fetal movements reviewed. General ed and anticipatory guidance. Nutrition/exercise/vitamin. Plans breast Plans pp contraception- unsure  Continue PNV.

## 2018-03-08 NOTE — PROGRESS NOTES
Ob f/u. + fetal movement good  No VB, LOF or contractions   C/O pelvic pain sat 03/2018 pt states was really bad, but today is doing better   Phone number # [phone]   [phone]   Work ph    [facility] [phone]  Pharmacy verified with patient  WT=  171 lbs            OQ=247/70  GBS   Severe IUGR with low STEPHANIE please send patient to labor and delivery for NST possible inpatient consultation from perinatology. Per Dr. Zarate  Provider  Discuss FYI with pt

## 2018-03-09 LAB
ERYTHROCYTE [DISTWIDTH] IN BLOOD BY AUTOMATED COUNT: 53.9 FL (ref 35.9–50)
HCT VFR BLD AUTO: 28.5 % (ref 37–47)
HGB BLD-MCNC: 8.9 G/DL (ref 12–16)
MCH RBC QN AUTO: 26.6 PG (ref 27–33)
MCHC RBC AUTO-ENTMCNC: 31.2 G/DL (ref 33.6–35)
MCV RBC AUTO: 85.1 FL (ref 81.4–97.8)
PLATELET # BLD AUTO: 230 K/UL (ref 164–446)
PMV BLD AUTO: 10.6 FL (ref 9–12.9)
RBC # BLD AUTO: 3.35 M/UL (ref 4.2–5.4)
WBC # BLD AUTO: 17.8 K/UL (ref 4.8–10.8)

## 2018-03-09 PROCEDURE — 770002 HCHG ROOM/CARE - OB PRIVATE (112)

## 2018-03-09 PROCEDURE — A9270 NON-COVERED ITEM OR SERVICE: HCPCS | Performed by: NURSE PRACTITIONER

## 2018-03-09 PROCEDURE — 85027 COMPLETE CBC AUTOMATED: CPT

## 2018-03-09 PROCEDURE — 700102 HCHG RX REV CODE 250 W/ 637 OVERRIDE(OP): Performed by: NURSE PRACTITIONER

## 2018-03-09 PROCEDURE — A9270 NON-COVERED ITEM OR SERVICE: HCPCS | Performed by: OBSTETRICS & GYNECOLOGY

## 2018-03-09 PROCEDURE — 700111 HCHG RX REV CODE 636 W/ 250 OVERRIDE (IP): Performed by: ANESTHESIOLOGY

## 2018-03-09 PROCEDURE — 36415 COLL VENOUS BLD VENIPUNCTURE: CPT

## 2018-03-09 PROCEDURE — 700102 HCHG RX REV CODE 250 W/ 637 OVERRIDE(OP): Performed by: OBSTETRICS & GYNECOLOGY

## 2018-03-09 RX ORDER — ACETAMINOPHEN 325 MG/1
325 TABLET ORAL EVERY 4 HOURS PRN
Status: DISCONTINUED | OUTPATIENT
Start: 2018-03-09 | End: 2018-03-12 | Stop reason: HOSPADM

## 2018-03-09 RX ORDER — IBUPROFEN 600 MG/1
600 TABLET ORAL EVERY 6 HOURS PRN
Status: DISCONTINUED | OUTPATIENT
Start: 2018-03-09 | End: 2018-03-12 | Stop reason: HOSPADM

## 2018-03-09 RX ORDER — OXYCODONE HYDROCHLORIDE 10 MG/1
10 TABLET ORAL EVERY 4 HOURS PRN
Status: DISCONTINUED | OUTPATIENT
Start: 2018-03-09 | End: 2018-03-12 | Stop reason: HOSPADM

## 2018-03-09 RX ORDER — MORPHINE SULFATE 4 MG/ML
4 INJECTION, SOLUTION INTRAMUSCULAR; INTRAVENOUS
Status: DISCONTINUED | OUTPATIENT
Start: 2018-03-09 | End: 2018-03-12 | Stop reason: HOSPADM

## 2018-03-09 RX ORDER — ONDANSETRON 4 MG/1
4 TABLET, ORALLY DISINTEGRATING ORAL EVERY 6 HOURS PRN
Status: DISCONTINUED | OUTPATIENT
Start: 2018-03-09 | End: 2018-03-12 | Stop reason: HOSPADM

## 2018-03-09 RX ORDER — ONDANSETRON 2 MG/ML
4 INJECTION INTRAMUSCULAR; INTRAVENOUS EVERY 6 HOURS PRN
Status: DISCONTINUED | OUTPATIENT
Start: 2018-03-09 | End: 2018-03-12 | Stop reason: HOSPADM

## 2018-03-09 RX ORDER — DIPHENHYDRAMINE HYDROCHLORIDE 50 MG/ML
25 INJECTION INTRAMUSCULAR; INTRAVENOUS EVERY 6 HOURS PRN
Status: DISCONTINUED | OUTPATIENT
Start: 2018-03-09 | End: 2018-03-12 | Stop reason: HOSPADM

## 2018-03-09 RX ORDER — OXYCODONE HYDROCHLORIDE AND ACETAMINOPHEN 5; 325 MG/1; MG/1
1 TABLET ORAL EVERY 4 HOURS PRN
Status: DISCONTINUED | OUTPATIENT
Start: 2018-03-09 | End: 2018-03-12 | Stop reason: HOSPADM

## 2018-03-09 RX ORDER — DIPHENHYDRAMINE HCL 25 MG
25 TABLET ORAL EVERY 6 HOURS PRN
Status: DISCONTINUED | OUTPATIENT
Start: 2018-03-09 | End: 2018-03-12 | Stop reason: HOSPADM

## 2018-03-09 RX ORDER — FERROUS SULFATE 325(65) MG
325 TABLET ORAL
Status: DISCONTINUED | OUTPATIENT
Start: 2018-03-09 | End: 2018-03-12 | Stop reason: HOSPADM

## 2018-03-09 RX ADMIN — Medication 1 TABLET: at 07:51

## 2018-03-09 RX ADMIN — Medication 325 MG: at 17:41

## 2018-03-09 RX ADMIN — KETOROLAC TROMETHAMINE 30 MG: 30 INJECTION, SOLUTION INTRAMUSCULAR; INTRAVENOUS at 13:48

## 2018-03-09 RX ADMIN — Medication 325 MG: at 13:02

## 2018-03-09 RX ADMIN — OXYCODONE HYDROCHLORIDE AND ACETAMINOPHEN 1 TABLET: 5; 325 TABLET ORAL at 22:03

## 2018-03-09 RX ADMIN — OXYCODONE HYDROCHLORIDE AND ACETAMINOPHEN 1 TABLET: 5; 325 TABLET ORAL at 17:44

## 2018-03-09 RX ADMIN — Medication 325 MG: at 07:51

## 2018-03-09 RX ADMIN — IBUPROFEN 600 MG: 600 TABLET, FILM COATED ORAL at 22:03

## 2018-03-09 RX ADMIN — KETOROLAC TROMETHAMINE 30 MG: 30 INJECTION, SOLUTION INTRAMUSCULAR; INTRAVENOUS at 01:43

## 2018-03-09 RX ADMIN — KETOROLAC TROMETHAMINE 30 MG: 30 INJECTION, SOLUTION INTRAMUSCULAR; INTRAVENOUS at 07:52

## 2018-03-09 ASSESSMENT — PATIENT HEALTH QUESTIONNAIRE - PHQ9
2. FEELING DOWN, DEPRESSED, IRRITABLE, OR HOPELESS: NOT AT ALL
1. LITTLE INTEREST OR PLEASURE IN DOING THINGS: NOT AT ALL
SUM OF ALL RESPONSES TO PHQ9 QUESTIONS 1 AND 2: 0
2. FEELING DOWN, DEPRESSED, IRRITABLE, OR HOPELESS: NOT AT ALL
SUM OF ALL RESPONSES TO PHQ9 QUESTIONS 1 AND 2: 0
SUM OF ALL RESPONSES TO PHQ QUESTIONS 1-9: 0
SUM OF ALL RESPONSES TO PHQ QUESTIONS 1-9: 0
1. LITTLE INTEREST OR PLEASURE IN DOING THINGS: NOT AT ALL

## 2018-03-09 ASSESSMENT — LIFESTYLE VARIABLES: DO YOU DRINK ALCOHOL: NO

## 2018-03-09 ASSESSMENT — PAIN SCALES - GENERAL
PAINLEVEL_OUTOF10: 0
PAINLEVEL_OUTOF10: 4
PAINLEVEL_OUTOF10: 3
PAINLEVEL_OUTOF10: 0
PAINLEVEL_OUTOF10: 0
PAINLEVEL_OUTOF10: 5
PAINLEVEL_OUTOF10: 4
PAINLEVEL_OUTOF10: 2
PAINLEVEL_OUTOF10: 0

## 2018-03-09 NOTE — PROGRESS NOTES
Post Partum Progress Note    Name:   Debi Juarez   Date/Time:  3/9/2018 - 6:31 AM  Chief Admitting Dx:  Pregnancy   delivery delivered  Labor and delivery, indication for care  IGUR  Delivery Type:   for IUGR  Post-Op/Post Partum Days #:  1    Subjective:  Abdominal pain: no  Ambulating:   yes  Tolerating liquids:  yes  Tolerating food:  yes common adult  Flatus:   no  BM:    no  Bleeding:   without any bleeding  Voiding:   yes  Dizziness:   no  Feeding:   Breast pump    Vitals:    18 0200 18 0300 18 0400 18 0500   BP:   116/75    Pulse: 82 71 73 80   Resp: 18 16 16 16   Temp:   36.9 °C (98.4 °F)    SpO2: 96% 98% 96% 96%   Weight:       Height:           Exam:  Breast: Tenderness no  Abdomen: Abdomen soft, non-tender. BS normal. No masses,  No organomegaly  Fundal Tenderness:  no  Fundus Firm: yes  Incision: Dsg remains of for 24 hrs post op, appears dry and intact  Below umbilicus: yes  Perineum: perineum intact  Lochia: mild  Extremities: Normal extremities, peripheral pulses and reflexes normal    Meds:  Current Facility-Administered Medications   Medication Dose   • ferrous sulfate tablet 325 mg  325 mg   • oxytocin (PITOCIN) infusion (for postpartum)  2,000 mL/hr    Followed by   • oxytocin (PITOCIN) infusion (for postpartum)   mL/hr   • LR infusion     • PRN oxytocin (PITOCIN) (20 Units/1000 mL) PRN for excessive uterine bleeding - See Admin Instr  125-999 mL/hr   • miSOPROStol (CYTOTEC) tablet 600 mcg  600 mcg   • methylergonovine (METHERGINE) injection 0.2 mg  0.2 mg   • docusate sodium (COLACE) capsule 100 mg  100 mg   • magnesium hydroxide (MILK OF MAGNESIA) suspension 30 mL  30 mL   • prenatal plus vitamin (STUARTNATAL 1+1) 27-1 MG tablet 1 Tab  1 Tab   • simethicone (MYLICON) chewable tab 80 mg  80 mg   • naloxone (NARCAN) injection 0.1 mg  0.1 mg   • ketorolac (TORADOL) injection 30 mg  30 mg   • oxyCODONE-acetaminophen (PERCOCET) 5-325 MG per  tablet 1 Tab  1 Tab   • oxyCODONE-acetaminophen (PERCOCET-10)  MG per tablet 1 Tab  1 Tab   • HYDROmorphone (DILAUDID) injection 0.2 mg  0.2 mg   • HYDROmorphone (DILAUDID) injection 0.4 mg  0.4 mg   • ePHEDrine injection 10 mg  10 mg   • ondansetron (ZOFRAN) syringe/vial injection 4 mg  4 mg   • metoclopramide (REGLAN) injection 10 mg  10 mg   • naloxone (NARCAN) 0.4 mg in NS 1,000 mL infusion  0.4 mg   • diphenhydrAMINE (BENADRYL) injection 12.5 mg  12.5 mg   • diphenhydrAMINE (BENADRYL) injection 25 mg  25 mg   • naloxone (NARCAN) 0.4 mg in NS 1,000 mL infusion  0.4 mg       Labs:   Recent Labs      18   1600  18   0303   WBC  10.0  17.8*   RBC  3.69*  3.35*   HEMOGLOBIN  9.8*  8.9*   HEMATOCRIT  30.9*  28.5*   MCV  83.7  85.1   MCH  26.6*  26.6*   MCHC  31.7*  31.2*   RDW  53.1*  53.9*   PLATELETCT  249  230   MPV  10.5  10.6       Assessment:  Chief Admitting Dx:  Pregnancy   delivery delivered  Labor and delivery, indication for care  IGUR  Delivery Type:   for IUGR  Tubal Ligation:  no    Plan:  Continue routine post partum care.  Advance care-Encourage breast pump, ambulation    ALEXANDRU Griffith.

## 2018-03-09 NOTE — PROGRESS NOTES
Patient arrived via gurney with belongings to room S338. Report received from Clinton VALENTINO. Patient oriented to room, call light and security. Assessment done, fundus firm, lochia light, vital signs stable, IV infusing 125 of Pit in left arm. Patient states pain is tolerable. See MAR. Patient aware of sleeping with infant. Reviewed plan of care with infant and encouraged to call with needs or prior to ambulating. Call light in place. Will continue to monitor.

## 2018-03-09 NOTE — PROGRESS NOTES
Initial assessment completed. Patient doing well, instructed to increase fluid intake, walk 2- 3 x a day continue use incentive spirometer and use of breast pump every 2- 3 hours as possible. Instructed to call for assistance.

## 2018-03-09 NOTE — H&P
History and Physical;      Debi Juarez is a 22 y.o. year old female  at 35w3d who presents for evaluation from the pregnancy center due to abnormal ultrasound performed on 18 showing severe IUGR oligohydramnios and highly abnormal elevated cord Dopplers. Patient is well-established dates with early ultrasound at 13 weeks but ultrasound shows significant IUGR. Clinic staff tried to alert patient multiple times but unable to contact patient due to disconnected phone. NST currently shows absent variability with late decelerations . The patient denies leakage of fluid    Subjective:   negative  For CTXS.   negative Feels pain   negative for LOF  negative for vaginal bleeding.   positive for fetal movement    ROS: A comprehensive review of systems was negative.    Past Medical History:   Diagnosis Date   • Anemia      No past surgical history on file.  OB History    Para Term  AB Living   1             SAB TAB Ectopic Molar Multiple Live Births                    # Outcome Date GA Lbr Rosales/2nd Weight Sex Delivery Anes PTL Lv   1 Current                 Social History     Social History   • Marital status: Single     Spouse name: N/A   • Number of children: N/A   • Years of education: N/A     Occupational History   • Not on file.     Social History Main Topics   • Smoking status: Never Smoker   • Smokeless tobacco: Never Used   • Alcohol use No   • Drug use: No   • Sexual activity: Not Currently     Partners: Male      Comment: none     Other Topics Concern   • Not on file     Social History Narrative   • No narrative on file     Allergies: Patient has no known allergies.  No current facility-administered medications for this encounter.     Prenatal care with [facility] with the following problems:  Patient Active Problem List    Diagnosis Date Noted   • Supervision of normal first pregnancy 10/25/2017     Priority: Medium   • Uterine size date discrepancy pregnancy - S<D, third trimester  "02/22/2018   • Anemia in pregnancy - 10.4/32.4 on 2/7/18 02/22/2018   • LGSIL on Pap smear of cervix 12/11/2017         Objective:      Blood pressure 123/84, pulse 85, temperature 37.3 °C (99.1 °F), height 1.626 m (5' 4\"), weight 77.6 kg (171 lb 1.2 oz), last menstrual period 06/24/2017.    General:   no acute distress   Skin:   normal   HEENT:  PERRLA   Lungs:   CTA bilateral   Heart:   brisk carotid upstroke without bruits, peripheral pulses very brisk, chest is clear without rales or wheezing, no pedal edema, no JVD, no hepatosplenomegaly   Abdomen:   gravid, NT   EFW:  2500 g   Pelvis:  Vulva and vagina appear normal. Bimanual exam reveals normal uterus and adnexa., proven to 0 g   FHTs: 130 BPM no accels and late decels absent variability   Contractions: Now contractions in 10 min soft to palpation   Uterine Size: S<D   Presentations: Cephalic per RN    Cervix: Per RN     Dilation: Closed    Effacement: Long    Station:  -3    Consistency: Firm    Position: Posterior     Complete OB US  See labs    Lab Review  Lab:   Blood type: A     Recent Results (from the past 5880 hour(s))   POCT Pregnancy    Collection Time: 09/08/17 11:11 AM   Result Value Ref Range    POC Urine Pregnancy Test POSITIVE Negative    Internal Control Positive Positive     Internal Control Negative Negative    URINALYSIS,CULTURE IF INDICATED    Collection Time: 09/19/17  8:00 PM   Result Value Ref Range    Color Yellow     Character Cloudy (A)     Specific Gravity 1.027 <1.035    Ph 5.5 5.0 - 8.0    Glucose Negative Negative mg/dL    Ketones Negative Negative mg/dL    Protein Negative Negative mg/dL    Bilirubin Negative Negative    Urobilinogen, Urine 1.0 Negative    Nitrite Negative Negative    Leukocyte Esterase Trace (A) Negative    Occult Blood Large (A) Negative    Micro Urine Req Microscopic     Culture Indicated Yes UA Culture   BETA-HCG QUALITATIVE URINE    Collection Time: 09/19/17  8:00 PM   Result Value Ref Range    Beta-Hcg " Urine Positive (A) Negative   REFRACTOMETER SG    Collection Time: 09/19/17  8:00 PM   Result Value Ref Range    Specific Gravity 1.028    URINE CULTURE(NEW)    Collection Time: 09/19/17  8:00 PM   Result Value Ref Range    Significant Indicator NEG     Source UR     Site      Urine Culture Mixed skin fernanda 10-50,000 cfu/mL    URINE MICROSCOPIC (W/UA)    Collection Time: 09/19/17  8:00 PM   Result Value Ref Range    WBC 10-20 (A) /hpf    RBC 2-5 (A) /hpf    Bacteria Many (A) None /hpf    Epithelial Cells Few /hpf    Hyaline Cast 11-20 (A) /lpf   CBC WITH DIFFERENTIAL    Collection Time: 09/19/17 11:00 PM   Result Value Ref Range    WBC 9.4 4.8 - 10.8 K/uL    RBC 4.09 (L) 4.20 - 5.40 M/uL    Hemoglobin 11.4 (L) 12.0 - 16.0 g/dL    Hematocrit 34.4 (L) 37.0 - 47.0 %    MCV 84.1 81.4 - 97.8 fL    MCH 27.9 27.0 - 33.0 pg    MCHC 33.1 (L) 33.6 - 35.0 g/dL    RDW 52.2 (H) 35.9 - 50.0 fL    Platelet Count 282 164 - 446 K/uL    MPV 10.4 9.0 - 12.9 fL    Neutrophils-Polys 65.20 44.00 - 72.00 %    Lymphocytes 26.80 22.00 - 41.00 %    Monocytes 6.50 0.00 - 13.40 %    Eosinophils 0.80 0.00 - 6.90 %    Basophils 0.30 0.00 - 1.80 %    Immature Granulocytes 0.40 0.00 - 0.90 %    Nucleated RBC 0.00 /100 WBC    Neutrophils (Absolute) 6.15 2.00 - 7.15 K/uL    Lymphs (Absolute) 2.53 1.00 - 4.80 K/uL    Monos (Absolute) 0.61 0.00 - 0.85 K/uL    Eos (Absolute) 0.08 0.00 - 0.51 K/uL    Baso (Absolute) 0.03 0.00 - 0.12 K/uL    Immature Granulocytes (abs) 0.04 0.00 - 0.11 K/uL    NRBC (Absolute) 0.00 K/uL   CMP    Collection Time: 09/19/17 11:00 PM   Result Value Ref Range    Sodium 138 135 - 145 mmol/L    Potassium 4.0 3.6 - 5.5 mmol/L    Chloride 107 96 - 112 mmol/L    Co2 24 20 - 33 mmol/L    Anion Gap 7.0 0.0 - 11.9    Glucose 84 65 - 99 mg/dL    Bun 12 8 - 22 mg/dL    Creatinine 0.59 0.50 - 1.40 mg/dL    Calcium 9.8 8.5 - 10.5 mg/dL    AST(SGOT) 14 12 - 45 U/L    ALT(SGPT) 11 2 - 50 U/L    Alkaline Phosphatase 59 30 - 99 U/L    Total  Bilirubin 0.2 0.1 - 1.5 mg/dL    Albumin 4.9 3.2 - 4.9 g/dL    Total Protein 7.8 6.0 - 8.2 g/dL    Globulin 2.9 1.9 - 3.5 g/dL    A-G Ratio 1.7 g/dL   RH TYPE FOR RHOGAM FROM E.D.    Collection Time: 09/19/17 11:00 PM   Result Value Ref Range    Emergency Department Rh Typing POS     Number Of Rh Doses Indicated ZERO    HCG QUANTITATIVE    Collection Time: 09/19/17 11:00 PM   Result Value Ref Range    Bhcg 65825.2 (H) 0.0 - 5.0 mIU/mL   ESTIMATED GFR    Collection Time: 09/19/17 11:00 PM   Result Value Ref Range    GFR If African American >60 >60 mL/min/1.73 m 2    GFR If Non African American >60 >60 mL/min/1.73 m 2   POCT Urinalysis    Collection Time: 10/25/17  7:57 AM   Result Value Ref Range    POC Color  Negative    POC Appearance  Negative    POC Leukocyte Esterase large Negative    POC Nitrites negative Negative    POC Urobiligen  Negative (0.2) mg/dL    POC Protein trace Negative mg/dL    POC Urine PH 6.0 5.0 - 8.0    POC Blood trace Negative    POC Specific Gravity 1.020 <1.005 - >1.030    POC Ketones negative Negative mg/dL    POC Bilirubin  Negative mg/dL    POC Glucose negative Negative mg/dL   PAP IG, RFX HPV ASCU    Collection Time: 10/30/17 12:00 AM   Result Value Ref Range    Physician Read Pap ABNORMAL(LSIL)    GC DNA PROBE    Collection Time: 10/30/17 12:00 AM   Result Value Ref Range    Gc By Dna Probe NEG    CHLAMYDIA DNA PROBE    Collection Time: 10/30/17 12:00 AM   Result Value Ref Range    Chlamydia By Dna Probe NEG    ABO AND RH DETERMINATION    Collection Time: 11/25/17 12:00 AM   Result Value Ref Range    Rh Grouping Only POS     ABO Grouping Only A    ANTIBODY SCREEN    Collection Time: 11/25/17 12:00 AM   Result Value Ref Range    Antibody Screen Scrn NEG    PLATELET COUNT    Collection Time: 11/25/17 12:00 AM   Result Value Ref Range    Platelet Count 267  k/uL    RUBELLA ABS IGG    Collection Time: 11/25/17 12:00 AM   Result Value Ref Range    Rubella IgG Antibody IMMUNE    AFP TETRA     Collection Time: 17 12:00 AM   Result Value Ref Range    Interpretation NEG FOR NTD, DS, TRISOMY    RPR (SYPHILIS)    Collection Time: 17 12:00 AM   Result Value Ref Range    Rapid Plasma Reagin -Rpr- NON REACTIVE    HCT    Collection Time: 17 12:00 AM   Result Value Ref Range    Hematocrit 34.5  %    HGB    Collection Time: 17 12:00 AM   Result Value Ref Range    Hemoglobin 11.2  g/dL    HIV ANTIBODIES    Collection Time: 17 12:00 AM   Result Value Ref Range    HIV 1,0,2 IC NON REACTIVE    HEP B SURFACE ANTIGEN    Collection Time: 17 12:00 AM   Result Value Ref Range    Hepatitis B Surface Antigen NON REACTIVE    GLUCOSE 1HR GESTATIONAL    Collection Time: 18 12:00 AM   Result Value Ref Range    Glucose, Post Dose 126  mg/dL    HCT    Collection Time: 18 12:00 AM   Result Value Ref Range    Hematocrit 32.4  %    HGB    Collection Time: 18 12:00 AM   Result Value Ref Range    Hemoglobin 10.4  g/dL    T.PALLIDUM AB EIA    Collection Time: 18 12:00 AM   Result Value Ref Range    Syphilis, Treponemal Qual NOT DETECTED         Assessment:   1.  IUP at 35w3d  2.  Labor status: Not in labor.  3.  Cat 3 FHTs  4.  Obstetrical history significant for   Patient Active Problem List    Diagnosis Date Noted   • Supervision of normal first pregnancy 10/25/2017     Priority: Medium   • Uterine size date discrepancy pregnancy - S<D, third trimester 2018   • Anemia in pregnancy - 10.4/32.4 on 2018   • LGSIL on Pap smear of cervix 2017   .      Plan:     Admit to L&D  GBS unknown  Routine labs  Pain management prn   Patient needs immediate  section due to category 3 fetal heart tracing and significant findings of IUGR on ultrasound.    Preoperative counseling performed-RN present for counseling session discussed the risks of pain bleeding infection damage to any or all internal organs including but not limited to; bowel intestines bladder  uterus tubes ovaries nerves blood vessels skin and baby possible blood transfusion with risk of AIDS and hepatitis possible wound hematoma/ seroma-all questions answered in detail  Anesthesia/operating room/charge nurse notified

## 2018-03-09 NOTE — DISCHARGE PLANNING
Medical Social Work     Infant: Juarez Smith     SW met with MOB at bedside to complete assessment. Confirmed information listed on facesheet is correct. SW provided MOB with children and family resource list and pediatrician list.     Plan:  SW to follow and assist as needed.     Discharge Planning Assessment Post Partum     Reason for Referral: NICU- 35 weeks 3d  Address: [address] [place] [address]  Type of Living Situation: Lives with FOB   Mom Diagnosis: Pregnancy  Baby Diagnosis: IUGR  Primary Language: English     Father of the Baby: John Smith  Involved in baby’s care? Yes  Contact Information: [phone]  FOB'S : 1996     Prenatal Care: Yes, [facility]  Mom's PCP: No PCP  PCP for new baby: No PCP yet     Support System: FOB and family  Source of Feeding: Pumping     Supplies for Infant: Infant came a little early and still needs some supplies. Reports that they will be able to obtain all supplies before infant discharges.      Mom's Insurance: Medicaid HMO  Baby Covered on Insurance: Yes  Mother Employed/School: Yes, [employer]  Other children in the home/names & ages: No     Financial Hardship/Income: Both POB employed  Mom's Mental status: A&Ox4  Services used prior to admit: Plans to enroll into WIC and food stamps     CPS History: N/A  Psychiatric History: Denies  Domestic Violence History: Denies  Drug/ETOH History: Denies     Resources Provided: Children and family resource list, pediatrician list  Referrals Made: None

## 2018-03-09 NOTE — OP REPORT
DATE OF SERVICE:  2018    PREOPERATIVE DIAGNOSES:  Intrauterine pregnancy at 35-3/7 weeks with severe   intrauterine growth restriction and markedly abnormal fetal heart rate   tracing.    POSTOPERATIVE DIAGNOSES:  Intrauterine pregnancy at 35-3/7 weeks with severe   intrauterine growth restriction and markedly abnormal fetal heart rate   tracing.    SURGEON:  Rustam Hill MD    ASSISTANT:  Teodoro Hooper MD    ANESTHESIA:  Spinal.    ANESTHESIOLOGIST:  Jaylan Steele MD    PROCEDURE:  Primary low transverse uterine  section.    ESTIMATED BLOOD LOSS:  500 mL.    COMPLICATIONS:  None.    DRAINS:  Urias catheter.    SPECIMENS:  Cord gases and placenta sent to pathology department.    INDICATIONS:  This patient is a 22-year-old  female,  1, para   0, at 35 and 3/7 weeks who had growth scan performed on  showing a   6-week growth lag and very abnormal elevated cord Dopplers and decreased   amniotic fluid.  The clinic tried to establish contact with the patient at   that time and over the last couple of weeks several times calling her at home,   at work, and other family members, but unable to get in contact with the   patient through multiple attempts and methods.  The patient came into the   clinic today and was immediately sent to labor and delivery for evaluation   where NST revealed absent variability and intermittent late decelerations.    Biophysical profile performed by myself is 2/8 and no amniotic fluid, STEPHANIE of 0   and the patient did not have a history of leakage of fluid or whatsoever.    FINDINGS:  Cephalic presentation, normal pelvis, clear amniotic fluid.  Cord   gases, arterial pH 7.19 with a base excess of -5, venous pH 7.26 with a base   excess of -6.    DESCRIPTION OF OPERATION:  After adequately being counseled, the patient was   taken to the operating room and placed in supine position.  Spinal anesthetic   was placed.  Patient was prepped and draped in  the usual sterile fashion.    Pfannenstiel skin incision made with a scalpel, taken down to the fascia.  The   fascia was incised with scalpel and the fascial incision taken laterally on   both sides with Dinero scissors.  Rectus fascia dissected off the underlying   rectus muscles both superiorly and inferiorly and the rectus muscles were   split in the midline.  The peritoneal cavity was entered sharply with   Metzenbaum scissors as high as possible taking care to avoid any internal   viscera.  Peritoneal incision was taken superiorly and inferiorly and a   bladder blade placed over the bladder.  Next, bladder flap was developed both   sharply and bluntly and a bladder blade replaced over the bladder.  Next, a   low transverse uterine incision was made with a scalpel.  The infant was   delivered, bulb suctioned, umbilical cord clamped and cut, and the infant   handed off to pediatrics.  The placenta was removed from the uterus.  Uterine   cavity was cleansed with a moist laparotomy sponge.  Uterus was exteriorized   and uterine incision was closed in 2 layers using 0 Vicryl in a running   locking fashion.  Second layer was an imbricating layer.  Uterus then returned   to abdominal pelvic cavity and pelvis was irrigated and suctioned,   electrocautery used for hemostasis as needed.  The peritoneal lining was   closed using running nonlocked stitch of 0 Vicryl.  Rectus muscles were   reapproximated using interrupted stitch of 0 chromic and the rectus fascia   closed using running nonlocked stitch of 0 Vicryl.  Subcutaneous tissues were   irrigated and suctioned, electrocautery used for hemostasis.  Skin was closed   using surgical staples and a pressure dressing was applied.       ____________________________________     MD TOMASA BORGES / AJAY    DD:  03/08/2018 19:25:05  DT:  03/08/2018 21:48:17    D#:  [id number]  Job#:  [id number]

## 2018-03-09 NOTE — CONSULTS
3/9/18 @ 0930) Mother started pumping. Pump flange 25 mm, observed good fit with pumping session. Instructed mother on importance of pumping every 3 hours or 8 pump sessions per day. Pump settings 80 (decrease to 50-60 once milk flows), suction 20% (to comfort) x 10-15 minutes. Pump log given with instructions on how to log pump sessions. Mother has medicaid, is not signed up for [facility] yet.

## 2018-03-09 NOTE — CARE PLAN
Problem: Altered physiologic condition related to postoperative  delivery  Goal: Patient physiologically stable as evidenced by normal lochia, palpable uterine involution and vital signs within normal limits  Outcome: PROGRESSING AS EXPECTED  Pt has firm fundus, normal lochia and her vital signs are within the normal limits.    Problem: Potential for postpartum infection related to surgical incision, compromised uterine condition, urinary tract or respiratory compromise  Goal: Patient will be afebrile and free from signs and symptoms of infection  Outcome: PROGRESSING AS EXPECTED  Pt is free of signs and symptoms of infection.

## 2018-03-09 NOTE — PROGRESS NOTES
1540 - report from PATRICIA Barber RN. Plan to go back for primary c/s within the hour.    - c/s delayed d/t emergent case, POC discussed, all questions answered. Will continue to monitor closely.    - back to OR 1 for primary  section (see OR charting).    - transferred to postpartum via gurney with side rails up x2, report to Jannie VALERA.    - call to Namita in the lab to report placenta being sent down on [facility].

## 2018-03-10 PROCEDURE — A9270 NON-COVERED ITEM OR SERVICE: HCPCS | Performed by: NURSE PRACTITIONER

## 2018-03-10 PROCEDURE — 700112 HCHG RX REV CODE 229: Performed by: OBSTETRICS & GYNECOLOGY

## 2018-03-10 PROCEDURE — 700102 HCHG RX REV CODE 250 W/ 637 OVERRIDE(OP): Performed by: NURSE PRACTITIONER

## 2018-03-10 PROCEDURE — A9270 NON-COVERED ITEM OR SERVICE: HCPCS | Performed by: OBSTETRICS & GYNECOLOGY

## 2018-03-10 PROCEDURE — 700102 HCHG RX REV CODE 250 W/ 637 OVERRIDE(OP): Performed by: OBSTETRICS & GYNECOLOGY

## 2018-03-10 PROCEDURE — 770002 HCHG ROOM/CARE - OB PRIVATE (112)

## 2018-03-10 RX ADMIN — IBUPROFEN 600 MG: 600 TABLET, FILM COATED ORAL at 03:58

## 2018-03-10 RX ADMIN — OXYCODONE HYDROCHLORIDE AND ACETAMINOPHEN 1 TABLET: 5; 325 TABLET ORAL at 18:43

## 2018-03-10 RX ADMIN — Medication 325 MG: at 13:20

## 2018-03-10 RX ADMIN — OXYCODONE HYDROCHLORIDE AND ACETAMINOPHEN 1 TABLET: 5; 325 TABLET ORAL at 03:58

## 2018-03-10 RX ADMIN — IBUPROFEN 600 MG: 600 TABLET, FILM COATED ORAL at 09:50

## 2018-03-10 RX ADMIN — IBUPROFEN 600 MG: 600 TABLET, FILM COATED ORAL at 21:59

## 2018-03-10 RX ADMIN — Medication 325 MG: at 07:48

## 2018-03-10 RX ADMIN — Medication 325 MG: at 17:13

## 2018-03-10 RX ADMIN — Medication 1 TABLET: at 07:48

## 2018-03-10 RX ADMIN — IBUPROFEN 600 MG: 600 TABLET, FILM COATED ORAL at 15:31

## 2018-03-10 RX ADMIN — DOCUSATE SODIUM 100 MG: 100 CAPSULE ORAL at 03:58

## 2018-03-10 ASSESSMENT — PAIN SCALES - GENERAL
PAINLEVEL_OUTOF10: 1
PAINLEVEL_OUTOF10: 1
PAINLEVEL_OUTOF10: 4
PAINLEVEL_OUTOF10: 4
PAINLEVEL_OUTOF10: 2
PAINLEVEL_OUTOF10: 3

## 2018-03-10 NOTE — CARE PLAN
Problem: Altered physiologic condition related to postoperative  delivery  Goal: Patient physiologically stable as evidenced by normal lochia, palpable uterine involution and vital signs within normal limits  Outcome: PROGRESSING AS EXPECTED  Assessment completed, vital sign within normal limit, educate patient regarding normal and abnormal sign of bleeding and how to massage fundus.    Problem: Alteration in comfort related to surgical incision and/or after birth pains  Goal: Patient is able to ambulate, care for self and infant with acceptable pain level  Outcome: PROGRESSING AS EXPECTED  Patient up and voided standby assist and well tolerated, encourage to see baby in nicu for bonding.

## 2018-03-10 NOTE — PROGRESS NOTES
Mother maintaining pumping schedule independently. Mother just pumped 10 ML colostrum, flange size 25 mm, mother reports comfortable. Pump settings reviewed speed 80 (decrease to 50-60 once milk flows), suction 35% (to comfort) x 10-15 minutes, every 3 hours or 8 pump sessions per day.

## 2018-03-10 NOTE — PROGRESS NOTES
Post Partum Progress Note    Name:   Debi Juarez   Date/Time:  3/10/2018 - 6:26 AM  Chief Admitting Dx:  Pregnancy   delivery delivered  Labor and delivery, indication for care  Delivery Type:   for IUGR, NRFHS  Post-Op/Post Partum Days #:  2    Subjective:  Abdominal pain: no  Ambulating:   yes  Tolerating liquids:  yes  Tolerating food:  yes common adult  Flatus:   yes  BM:    no  Bleeding:   without any bleeding  Voiding:   yes  Dizziness:   no  Feeding:   breast    Vitals:    18 0900 18 1200 18 1700 18   BP:  108/77 116/81 116/81   Pulse: 81 81 93 97   Resp:    Temp:  37.1 °C (98.7 °F) 36.6 °C (97.9 °F) 36.3 °C (97.3 °F)   SpO2: 96% 100% 93% 97%   Weight:       Height:           Exam:  Breast: Tenderness yes  Abdomen: Abdomen soft, non-tender. BS normal. No masses,  No organomegaly  Fundal Tenderness:  no  Fundus Firm: yes  Incision: dry and intact  Below umbilicus: yes  Perineum: perineum intact  Lochia: mild  Extremities: Normal extremities, peripheral pulses and reflexes normal    Meds:  Current Facility-Administered Medications   Medication Dose   • ibuprofen (MOTRIN) tablet 600 mg  600 mg   • acetaminophen (TYLENOL) tablet 325 mg  325 mg   • oxyCODONE-acetaminophen (PERCOCET) 5-325 MG per tablet 1 Tab  1 Tab   • oxyCODONE immediate release (ROXICODONE) tablet 10 mg  10 mg   • morphine (pf) 4 mg/ml injection 4 mg  4 mg   • ondansetron (ZOFRAN) syringe/vial injection 4 mg  4 mg    Or   • ondansetron (ZOFRAN ODT) dispertab 4 mg  4 mg   • diphenhydrAMINE (BENADRYL) tablet/capsule 25 mg  25 mg    Or   • diphenhydrAMINE (BENADRYL) injection 25 mg  25 mg   • ferrous sulfate tablet 325 mg  325 mg   • oxytocin (PITOCIN) infusion (for postpartum)   mL/hr   • LR infusion     • PRN oxytocin (PITOCIN) (20 Units/1000 mL) PRN for excessive uterine bleeding - See Admin Instr  125-999 mL/hr   • miSOPROStol (CYTOTEC) tablet 600 mcg  600 mcg   •  methylergonovine (METHERGINE) injection 0.2 mg  0.2 mg   • docusate sodium (COLACE) capsule 100 mg  100 mg   • magnesium hydroxide (MILK OF MAGNESIA) suspension 30 mL  30 mL   • prenatal plus vitamin (STUARTNATAL 1+1) 27-1 MG tablet 1 Tab  1 Tab   • simethicone (MYLICON) chewable tab 80 mg  80 mg       Labs:   Recent Labs      18   1600  18   0303   WBC  10.0  17.8*   RBC  3.69*  3.35*   HEMOGLOBIN  9.8*  8.9*   HEMATOCRIT  30.9*  28.5*   MCV  83.7  85.1   MCH  26.6*  26.6*   MCHC  31.7*  31.2*   RDW  53.1*  53.9*   PLATELETCT  249  230   MPV  10.5  10.6       Assessment:  Chief Admitting Dx:  Pregnancy   delivery delivered  Labor and delivery, indication for care  Delivery Type:   for IUGR, NRFHS  Tubal Ligation:  no    Plan:  Continue routine post partum care.  Continue to breast feed  Ambulate  Baby at the NICU  May be d/c'd POD 4     Kelly Stout M.D.

## 2018-03-10 NOTE — PROGRESS NOTES
Mother does not have WIC yet, today is Saturday. Information given on renting HG pump through [employer] for the weekend ($20 a week), if discharged. WIC infor sheet given, encouraged mother to call on Monday to get signed-up. Reinforced with mother she will need a HG pump for home.

## 2018-03-10 NOTE — CARE PLAN
Problem: Altered physiologic condition related to postoperative  delivery  Goal: Patient physiologically stable as evidenced by normal lochia, palpable uterine involution and vital signs within normal limits  Outcome: PROGRESSING AS EXPECTED  Pt has firm fundus, normal lochia and her vital signs are within the defined parameters.    Problem: Potential for postpartum infection related to surgical incision, compromised uterine condition, urinary tract or respiratory compromise  Goal: Patient will be afebrile and free from signs and symptoms of infection  Outcome: PROGRESSING AS EXPECTED  Pt is free of signs and symptoms of infection.

## 2018-03-10 NOTE — PROGRESS NOTES
Patient assessment is complete, wnl. Fundus is firm with minimal discharge. Patient ambulating and voiding without difficulty.  Plan of care discussed with patient. Questions answered. Encouraged to call with needs. Will monitor.

## 2018-03-10 NOTE — PROGRESS NOTES
Received report from Ann-Marie VALERA. Pt is pumping. Pt advised call with needs. Will continue postpartum care.

## 2018-03-10 NOTE — CARE PLAN
Problem: Potential knowledge deficit related to lack of understanding of self and  care  Goal: Patient will verbalize understanding of self and infant care    Intervention: Assess patient and knowledge of self and infant care  Patient taking shower with fob assistance. Patient walking to nicu with fob. Patient states prn pain medication is effective 2/10 pain scale.     Goal: Patient will demonstrate ability to care for self and infant    Intervention: Assess patient and knowledge of self and infant care  Patient taking shower with fob assistance. Patient walking to nicu with fob. Patient states prn pain medication is effective 2/10 pain scale.

## 2018-03-11 PROCEDURE — 770002 HCHG ROOM/CARE - OB PRIVATE (112)

## 2018-03-11 PROCEDURE — A9270 NON-COVERED ITEM OR SERVICE: HCPCS | Performed by: NURSE PRACTITIONER

## 2018-03-11 PROCEDURE — 700102 HCHG RX REV CODE 250 W/ 637 OVERRIDE(OP): Performed by: OBSTETRICS & GYNECOLOGY

## 2018-03-11 PROCEDURE — A9270 NON-COVERED ITEM OR SERVICE: HCPCS | Performed by: OBSTETRICS & GYNECOLOGY

## 2018-03-11 PROCEDURE — 700102 HCHG RX REV CODE 250 W/ 637 OVERRIDE(OP): Performed by: NURSE PRACTITIONER

## 2018-03-11 RX ADMIN — IBUPROFEN 600 MG: 600 TABLET, FILM COATED ORAL at 13:27

## 2018-03-11 RX ADMIN — Medication 325 MG: at 08:12

## 2018-03-11 RX ADMIN — OXYCODONE HYDROCHLORIDE 10 MG: 10 TABLET ORAL at 12:18

## 2018-03-11 RX ADMIN — Medication 325 MG: at 12:18

## 2018-03-11 RX ADMIN — OXYCODONE HYDROCHLORIDE AND ACETAMINOPHEN 1 TABLET: 5; 325 TABLET ORAL at 08:12

## 2018-03-11 RX ADMIN — Medication 325 MG: at 17:39

## 2018-03-11 RX ADMIN — Medication 1 TABLET: at 08:12

## 2018-03-11 ASSESSMENT — PAIN SCALES - GENERAL
PAINLEVEL_OUTOF10: 0
PAINLEVEL_OUTOF10: 6
PAINLEVEL_OUTOF10: 2
PAINLEVEL_OUTOF10: 6

## 2018-03-11 NOTE — CARE PLAN
Problem: Discharge Barriers/Planning  Goal: Patient's continuum of care needs will be met  NB is in the NICU, expected discharge tomorrow.    Problem: Pain Management  Goal: Pain level will decrease to patient's comfort goal    Intervention: Follow pain managment plan developed in collaboration with patient and Interdisciplinary Team  Patient reported pain 6/10 in her abdomen and back, sore. Medicated per MAR. Cold/warm compresses offered, declined by pt. Hourly rounding implemented. Ambulation encouraged.

## 2018-03-11 NOTE — PROGRESS NOTES
Report received from MORAIMA Stafford. Pt is Hilaria, FOB at bedside. Assessment completed. Fundus is firm, lochia light. Pt complaining of pain 6/10 in her abdomen.  Medicated per MAR. Pt ambulates independently. Pt educated regarding plan of care, including NICU visits and pain management as needed. All questions answered. Call light and personal belongings in reach. No additional needs at this time.

## 2018-03-11 NOTE — PROGRESS NOTES
Assessment completed. WDL. Vital signs stable. Patient progressing according to plan of care. Patient up and ambulating. Pt states she is voiding without difficulty. Patient claims to have good pain relief with prn pain medications. Pt states she will call when she needs prn pain medication. Pt denies any other issues at this time. Educated pt about pumping. Pt encouraged to call for any needs.

## 2018-03-11 NOTE — CARE PLAN
Problem: Altered physiologic condition related to postoperative  delivery  Goal: Patient physiologically stable as evidenced by normal lochia, palpable uterine involution and vital signs within normal limits  Outcome: PROGRESSING AS EXPECTED  Fundus firm. Lochia light. Vital signs WDL.     Problem: Alteration in comfort related to surgical incision and/or after birth pains  Goal: Patient is able to ambulate, care for self and infant with acceptable pain level  Outcome: PROGRESSING AS EXPECTED  Pt able to ambulate, care for self. Pt states her pain is controlled with prn pain medication. Pt's pain reassessed throughout this shift.

## 2018-03-11 NOTE — PROGRESS NOTES
"Debi Juarez PP day 3 POD 3    Subjective: Abdominal pain. no, ambulating .yes, tolerating liquids .yes, tolerating regular diet .yes, flatus.yes, BM .yes, Bleeding .minimal, voiding .yes,dizziness .no, breast feeding.yes, breast tenderness .no    Blood pressure 122/84, pulse 70, temperature 36.3 °C (97.3 °F), resp. rate 16, height 1.626 m (5' 4\"), weight 77.6 kg (171 lb 1.2 oz), last menstrual period 06/24/2017, SpO2 93 %.  Breast Exam: Tenderness .no, Engourgement .no, Mastitis .no  Abdomen soft, non-tender. BS normal. No masses,  No organomegaly  Incision: dry and intact  Fundus Tenderness:no, Below umbilicus:Yes, firm  Perineumperineum intact  ExtremitiesNormal, no cyanosis, clubbing    Meds:   No current facility-administered medications on file prior to encounter.      No current outpatient prescriptions on file prior to encounter.       Lab: No results found for this or any previous visit (from the past 48 hour(s)).    Assessment and Plan  normal postpartum course and Course complicated by C/S at 35 weeks with baby in NICU  No areas of skin breakdown/redness; surgical incision intact/healing    Continue Routine postpartum care  Ambulate  D/C home tomorrow    "

## 2018-03-12 VITALS
BODY MASS INDEX: 29.21 KG/M2 | WEIGHT: 171.08 LBS | DIASTOLIC BLOOD PRESSURE: 89 MMHG | HEIGHT: 64 IN | TEMPERATURE: 97.3 F | HEART RATE: 78 BPM | SYSTOLIC BLOOD PRESSURE: 127 MMHG | OXYGEN SATURATION: 97 % | RESPIRATION RATE: 16 BRPM

## 2018-03-12 PROCEDURE — 700102 HCHG RX REV CODE 250 W/ 637 OVERRIDE(OP): Performed by: OBSTETRICS & GYNECOLOGY

## 2018-03-12 PROCEDURE — A9270 NON-COVERED ITEM OR SERVICE: HCPCS | Performed by: NURSE PRACTITIONER

## 2018-03-12 PROCEDURE — 700102 HCHG RX REV CODE 250 W/ 637 OVERRIDE(OP): Performed by: NURSE PRACTITIONER

## 2018-03-12 PROCEDURE — A9270 NON-COVERED ITEM OR SERVICE: HCPCS | Performed by: OBSTETRICS & GYNECOLOGY

## 2018-03-12 RX ORDER — IBUPROFEN 600 MG/1
600 TABLET ORAL EVERY 6 HOURS PRN
Qty: 30 TAB | Refills: 1 | Status: ON HOLD | OUTPATIENT
Start: 2018-03-12 | End: 2018-06-29

## 2018-03-12 RX ORDER — OXYCODONE HYDROCHLORIDE AND ACETAMINOPHEN 5; 325 MG/1; MG/1
1-2 TABLET ORAL EVERY 4 HOURS PRN
Qty: 30 TAB | Refills: 0 | Status: SHIPPED | OUTPATIENT
Start: 2018-03-12 | End: 2018-03-19

## 2018-03-12 RX ADMIN — Medication 1 TABLET: at 08:10

## 2018-03-12 RX ADMIN — IBUPROFEN 600 MG: 600 TABLET, FILM COATED ORAL at 08:12

## 2018-03-12 RX ADMIN — OXYCODONE HYDROCHLORIDE AND ACETAMINOPHEN 1 TABLET: 5; 325 TABLET ORAL at 11:03

## 2018-03-12 RX ADMIN — Medication 325 MG: at 08:10

## 2018-03-12 RX ADMIN — Medication 325 MG: at 11:03

## 2018-03-12 ASSESSMENT — PAIN SCALES - GENERAL
PAINLEVEL_OUTOF10: 3
PAINLEVEL_OUTOF10: 0
PAINLEVEL_OUTOF10: 1
PAINLEVEL_OUTOF10: 3
PAINLEVEL_OUTOF10: 0

## 2018-03-12 NOTE — PROGRESS NOTES
Report received from Maricruz VALERA @ the change of shift.  Assumed care. Discussed plan of care especially on managing pain. Assessment done. Encouraged to call if with needs. Will check at intervals.

## 2018-03-12 NOTE — DISCHARGE SUMMARY
Discharge Summary:      Debi Juarez      Admit Date:   3/8/2018  Discharge Date:  3/12/2018     Admitting diagnosis:  Pregnancy   delivery delivered  Labor and delivery, indication for care  IGUR  Discharge Diagnosis: Status post  for fetal distress.  Pregnancy Complications: iugr  Tubal Ligation:  no        History:  Past Medical History:   Diagnosis Date   • Anemia    • Pregnant      OB History    Para Term  AB Living   1             SAB TAB Ectopic Molar Multiple Live Births                    # Outcome Date GA Lbr Rosales/2nd Weight Sex Delivery Anes PTL Lv   1 Current                    Patient has no known allergies.  Patient Active Problem List    Diagnosis Date Noted   • Supervision of normal first pregnancy 10/25/2017     Priority: Medium   • Uterine size date discrepancy pregnancy - S<D, third trimester 2018   • Anemia in pregnancy - 10.4/32.4 on 2018   • LGSIL on Pap smear of cervix 2017        Hospital Course:   22 y.o. , now para 1, was admitted with the above mentioned diagnosis, underwent Primary  fetal distress,  for fetal distress. Patient postpartum course was unremarkable, with progressive advancement in diet , ambulation and toleration of oral analgesia. Patient without complaints today and desires discharge.      Vitals:    03/10/18 0740 03/10/18 2000 03/11/18 0800 18   BP: 125/84 122/84 113/82 135/97   Pulse: 77 70 81 79   Resp: 17 16 16 17   Temp: 36.1 °C (96.9 °F) 36.3 °C (97.3 °F) 36.9 °C (98.4 °F) 36.2 °C (97.1 °F)   SpO2: 97% 93% 97% 97%   Weight:       Height:           Current Facility-Administered Medications   Medication Dose   • ibuprofen (MOTRIN) tablet 600 mg  600 mg   • acetaminophen (TYLENOL) tablet 325 mg  325 mg   • oxyCODONE-acetaminophen (PERCOCET) 5-325 MG per tablet 1 Tab  1 Tab   • oxyCODONE immediate release (ROXICODONE) tablet 10 mg  10 mg   • morphine (pf) 4 mg/ml injection  4 mg  4 mg   • ondansetron (ZOFRAN) syringe/vial injection 4 mg  4 mg    Or   • ondansetron (ZOFRAN ODT) dispertab 4 mg  4 mg   • diphenhydrAMINE (BENADRYL) tablet/capsule 25 mg  25 mg    Or   • diphenhydrAMINE (BENADRYL) injection 25 mg  25 mg   • ferrous sulfate tablet 325 mg  325 mg   • oxytocin (PITOCIN) infusion (for postpartum)   mL/hr   • LR infusion     • PRN oxytocin (PITOCIN) (20 Units/1000 mL) PRN for excessive uterine bleeding - See Admin Instr  125-999 mL/hr   • miSOPROStol (CYTOTEC) tablet 600 mcg  600 mcg   • methylergonovine (METHERGINE) injection 0.2 mg  0.2 mg   • docusate sodium (COLACE) capsule 100 mg  100 mg   • magnesium hydroxide (MILK OF MAGNESIA) suspension 30 mL  30 mL   • prenatal plus vitamin (STUARTNATAL 1+1) 27-1 MG tablet 1 Tab  1 Tab   • simethicone (MYLICON) chewable tab 80 mg  80 mg       Exam:  Breast Exam: Inspection negative. No nipple discharge or bleeding. No masses or nodularity palpable  Abdomen: Abdomen soft, non-tender. BS normal. No masses,  No organomegaly  Fundus Non Tender: yes  Incision: dry and intact  Perineum: perineum intact  Extremity: extremities, peripheral pulses and reflexes normal     Labs:         Activity:   Discharge to home  Pelvic Rest x 6 weeks    Assessment:  normal postpartum course  Discharge Assessment: No areas of skin breakdown/redness; surgical incision intact/healing     [facility] in 5 weeks for vaginal ; 1 week for incision check.      Discharge Meds:   Current Outpatient Prescriptions   Medication Sig Dispense Refill   • oxyCODONE-acetaminophen (PERCOCET) 5-325 MG Tab Take 1-2 Tabs by mouth every four hours as needed for up to 7 days. 30 Tab 0   • ibuprofen (MOTRIN) 600 MG Tab Take 1 Tab by mouth every 6 hours as needed (For cramping after delivery; do not give if patient is receiving ketorolac (Toradol)). 30 Tab 1       Juana Zarate M.D.

## 2018-03-12 NOTE — PROGRESS NOTES
Anat Lara R.N. Lactation Consultant Signed   Progress Notes Date of Service: 3/12/2018 12:00 PM         []Hide copied text  []Hover for attribution information  Met with mother briefly before discharge. Her baby is in the NICU and she is pumping . She has [facility] an will go to the [place] office to  a breastpump on her way home today. He WIC office will be her resource for Lactation issues.

## 2018-03-12 NOTE — DISCHARGE INSTRUCTIONS
POSTPARTUM DISCHARGE INSTRUCTIONS FOR MOM    YOB: 1995   Age: 22 y.o.               Admit Date: 3/8/2018     Discharge Date: 3/12/2018  Attending Doctor:  Rustam Hill M.D.                  Allergies:  Patient has no known allergies.    Discharged to home by car. Discharged via wheelchair, hospital escort: Yes.  Special equipment needed: Not Applicable  Belongings with: Personal  Be sure to schedule a follow-up appointment with your primary care doctor or any specialists as instructed.     Discharge Plan:   Diet Plan: Discussed  Activity Level: Discussed  Confirmed Follow up Appointment: Patient to Call and Schedule Appointment  Confirmed Symptoms Management: Discussed  Medication Reconciliation Updated: Yes  Influenza Vaccine Indication: Not indicated: Previously immunized this influenza season and > 8 years of age    REASONS TO CALL YOUR OBSTETRICIAN:  1.   Persistent fever or shaking chills (Temperature higher than 100.4)  2.   Heavy bleeding (soaking more than 1 pad per hour); Passing clots  3.   Foul odor from vagina  4.   Mastitis (Breast infection; breast pain, chills, fever, redness)  5.   Urinary pain, burning or frequency  6.   Abdominal incision infection  7.   Severe depression longer than 24 hours    HAND WASHING  · Prior to handling the baby.  · Before breastfeeding or bottle feeding baby.  · After using the bathroom or changing the baby's diaper.    WOUND CARE  Ask your physician for additional care instructions.  In general:    ·  Incision:      · Keep clean and dry.    · Do NOT lift anything heavier than your baby for up to 6 weeks.    · There should not be any opening or pus.      VAGINAL CARE  · Nothing inside vagina for 6 weeks: no sexual intercourse, tampons or douching.  · Bleeding may continue for 2-4 weeks.  Amount may vary.    · Call your physician for heavy bleeding which means soaking more than 1 pad per hour    BIRTH CONTROL  · It is possible to become pregnant at  "any time after delivery and while breastfeeding.  · Plan to discuss a method of birth control with your physician at your follow up visit. visit.    DIET AND ELIMINATION  · Eating more fiber (bran cereal, fruits, and vegetables) and drinking plenty of fluids will help to avoid constipation.  · Urinary frequency after childbirth is normal.    POSTPARTUM BLUES  During the first few days after birth, you may experience a sense of the \"blues\" which may include impatience, irritability or even crying.  These feeling come and go quickly.  However, as many as 1 in 10 women experience emotional symptoms known as postpartum depression.    Postpartum depression:  May start as early as the second or third day after delivery or take several weeks or months to develop.  Symptoms of \"blues\" are present, but are more intense:  Crying spells; loss of appetite; feelings of hopelessness or loss of control; fear of touching the baby; over concern or no concern at all about the baby; little or no concern about your own appearance/caring for yourself; and/or inability to sleep or excessive sleeping.  Contact your physician if you are experiencing any of these symptoms.    Crisis Hotline:  · [place] Crisis Hotline:  0-683-FMDLXXN  Or 1-738.993.8519  · [place] Crisis Hotline:  1-537.338.5067  Or 375-344-7450    PREVENTING SHAKEN BABY:  If you are angry or stressed, PUT THE BABY IN THE CRIB, step away, take some deep breaths, and wait until you are calm to care for the baby.  DO NOT SHAKE THE BABY.  You are not alone, call a supporter for help.    · Crisis Call Center 24/7 crisis line 656-519-3298 or 1-612.996.9166  · You can also text them, text \"ANSWER\" to 306435    QUIT SMOKING/TOBACCO USE:  I understand the use of any tobacco products increases my chance of suffering from future heart disease and could cause other illnesses which may shorten my life. Quitting the use of tobacco products is the single most important thing I can do to " improve my health. For further information on smoking / tobacco cessation call a Toll Free Quit Line at 1-793.729.5753 (*[employer] [place]) or 1-289.907.1974 (American Lung Association) or you can access the web based program at www.lungusa.org.    · [place] Tobacco Users Help Line:  (604) 695-9877       Toll Free: 1-229.671.5943  · Quit Tobacco Program [facility] (811)074-3117    DEPRESSION / SUICIDE RISK:  As you are discharged from this [facility], it is important to learn how to keep safe from harming yourself.    Recognize the warning signs:  · Abrupt changes in personality, positive or negative- including increase in energy   · Giving away possessions  · Change in eating patterns- significant weight changes-  positive or negative  · Change in sleeping patterns- unable to sleep or sleeping all the time   · Unwillingness or inability to communicate  · Depression  · Unusual sadness, discouragement and loneliness  · Talk of wanting to die  · Neglect of personal appearance   · Rebelliousness- reckless behavior  · Withdrawal from people/activities they love  · Confusion- inability to concentrate     If you or a loved one observes any of these behaviors or has concerns about self-harm, here's what you can do:  · Talk about it- your feelings and reasons for harming yourself  · Remove any means that you might use to hurt yourself (examples: pills, rope, extension cords, firearm)  · Get professional help from the community (Mental Health, Substance Abuse, psychological counseling)  · Do not be alone:Call your Safe Contact- someone whom you trust who will be there for you.  · Call your local CRISIS HOTLINE 930-5858 or 398-587-9573  · Call your local Children's Mobile Crisis Response Team [place] (196) 855-0517 or www."[employer]  · Call the toll free National Suicide Prevention Hotlines   · National Suicide Prevention Lifeline 957-560-PXXB (2600)  · National Hope Line  [facility] 800-SUICIDE (009-3219)    DISCHARGE SURVEY:  Thank you for choosing [facility].  We hope we provided you with very good care.  You may be receiving a survey in the mail.  Please fill it out.  Your opinion is valuable to us.    ADDITIONAL EDUCATIONAL MATERIALS GIVEN TO PATIENT:        My signature on this form indicates that:  1.  I have reviewed and understand the above information  2.  My questions regarding this information have been answered to my satisfaction.  3.  I have formulated a plan with my discharge nurse to obtain my prescribed medication for home.

## 2018-03-12 NOTE — PROGRESS NOTES
Report received pt care assumed. Pt ambulating in room,  at bedside, denies pain. All needs met at this time.

## 2018-03-12 NOTE — PROGRESS NOTES
A&Ox4, pleasant. Denies pain. SO at bedside, also pleasant. Fundus firm, lochia light. Lower abdominal surgical incision assessed, no s/s of infection, JANET, approximated with staples. POC discussed, including possibility of d/c home today, all questions and concerns were addressed. VSS. Labs stable. Will continue to monitor.

## 2018-03-15 ENCOUNTER — POST PARTUM (OUTPATIENT)
Dept: OBGYN | Facility: CLINIC | Age: 23
End: 2018-03-15
Payer: MEDICAID

## 2018-03-15 VITALS — WEIGHT: 162 LBS | BODY MASS INDEX: 27.81 KG/M2 | DIASTOLIC BLOOD PRESSURE: 68 MMHG | SYSTOLIC BLOOD PRESSURE: 108 MMHG

## 2018-03-15 DIAGNOSIS — Z09 POSTOP CHECK: ICD-10-CM

## 2018-03-15 DIAGNOSIS — R87.612 LGSIL ON PAP SMEAR OF CERVIX: ICD-10-CM

## 2018-03-15 PROCEDURE — 90050 PR POSTPARTUM VISIT: CPT | Performed by: OBSTETRICS & GYNECOLOGY

## 2018-03-15 NOTE — PROGRESS NOTES
SUBJECTIVE:  Debi Juarez presents to the clinic 2 weeks following Primary C/S    Eating a regular diet without difficulty. Bowel movement are Normal.  Pain is controlled with current analgesics.  Medication(s) being used: ibuprofen or percocet. Spotting. Patient Denies Incisional pain, drainage or redness    OBJECTIVE:  /68   Wt 73.5 kg (162 lb)   LMP 06/24/2017   BMI 27.81 kg/m²   Current Outpatient Prescriptions on File Prior to Visit   Medication Sig Dispense Refill   • oxyCODONE-acetaminophen (PERCOCET) 5-325 MG Tab Take 1-2 Tabs by mouth every four hours as needed for up to 7 days. 30 Tab 0   • ibuprofen (MOTRIN) 600 MG Tab Take 1 Tab by mouth every 6 hours as needed (For cramping after delivery; do not give if patient is receiving ketorolac (Toradol)). 30 Tab 1     No current facility-administered medications on file prior to visit.        Constitutional:  alert, no distress.  Abdomen:  soft, bowel sounds active, non-tender.  Incision:  healing well, no drainage, no erythema, no hernia, no seroma, no swelling, no dehiscence, incision well approximated.    IMPRESSION: Doing well postoperatively.  Pt is to increase activities as tolerated.    Lab:   Recent Results (from the past 1008 hour(s))   GLUCOSE 1HR GESTATIONAL    Collection Time: 02/03/18 12:00 AM   Result Value Ref Range    Glucose, Post Dose 126  mg/dL    HCT    Collection Time: 02/03/18 12:00 AM   Result Value Ref Range    Hematocrit 32.4  %    HGB    Collection Time: 02/03/18 12:00 AM   Result Value Ref Range    Hemoglobin 10.4  g/dL    T.PALLIDUM AB EIA    Collection Time: 02/03/18 12:00 AM   Result Value Ref Range    Syphilis, Treponemal Qual NOT DETECTED    Hold Blood Bank Specimen (Not Tested)    Collection Time: 03/08/18  4:00 PM   Result Value Ref Range    Holding Tube - Bb DONE    CBC with Differential    Collection Time: 03/08/18  4:00 PM   Result Value Ref Range    WBC 10.0 4.8 - 10.8 K/uL    RBC 3.69 (L) 4.20 - 5.40 M/uL     Hemoglobin 9.8 (L) 12.0 - 16.0 g/dL    Hematocrit 30.9 (L) 37.0 - 47.0 %    MCV 83.7 81.4 - 97.8 fL    MCH 26.6 (L) 27.0 - 33.0 pg    MCHC 31.7 (L) 33.6 - 35.0 g/dL    RDW 53.1 (H) 35.9 - 50.0 fL    Platelet Count 249 164 - 446 K/uL    MPV 10.5 9.0 - 12.9 fL    Neutrophils-Polys 68.40 44.00 - 72.00 %    Lymphocytes 22.90 22.00 - 41.00 %    Monocytes 7.00 0.00 - 13.40 %    Eosinophils 0.40 0.00 - 6.90 %    Basophils 0.30 0.00 - 1.80 %    Immature Granulocytes 1.00 (H) 0.00 - 0.90 %    Nucleated RBC 0.00 /100 WBC    Neutrophils (Absolute) 6.84 2.00 - 7.15 K/uL    Lymphs (Absolute) 2.29 1.00 - 4.80 K/uL    Monos (Absolute) 0.70 0.00 - 0.85 K/uL    Eos (Absolute) 0.04 0.00 - 0.51 K/uL    Baso (Absolute) 0.03 0.00 - 0.12 K/uL    Immature Granulocytes (abs) 0.10 0.00 - 0.11 K/uL    NRBC (Absolute) 0.00 K/uL   CBC without differential    Collection Time: 03/09/18  3:03 AM   Result Value Ref Range    WBC 17.8 (H) 4.8 - 10.8 K/uL    RBC 3.35 (L) 4.20 - 5.40 M/uL    Hemoglobin 8.9 (L) 12.0 - 16.0 g/dL    Hematocrit 28.5 (L) 37.0 - 47.0 %    MCV 85.1 81.4 - 97.8 fL    MCH 26.6 (L) 27.0 - 33.0 pg    MCHC 31.2 (L) 33.6 - 35.0 g/dL    RDW 53.9 (H) 35.9 - 50.0 fL    Platelet Count 230 164 - 446 K/uL    MPV 10.6 9.0 - 12.9 fL       PLAN:  Continue any current medications.  (See Med List for details.)  Return to clinic  : in 4 week(s).

## 2018-03-15 NOTE — PROGRESS NOTES
Pt here for C/S check delivered on 3/8/18  Currently pumping milk.  # [phone]  Pt states no complaints.

## 2018-04-13 ENCOUNTER — POST PARTUM (OUTPATIENT)
Dept: OBGYN | Facility: CLINIC | Age: 23
End: 2018-04-13
Payer: MEDICAID

## 2018-04-13 VITALS — DIASTOLIC BLOOD PRESSURE: 78 MMHG | BODY MASS INDEX: 25.58 KG/M2 | SYSTOLIC BLOOD PRESSURE: 112 MMHG | WEIGHT: 149 LBS

## 2018-04-13 DIAGNOSIS — Z34.00 SUPERVISION OF NORMAL FIRST PREGNANCY, ANTEPARTUM: ICD-10-CM

## 2018-04-13 DIAGNOSIS — R87.612 LGSIL ON PAP SMEAR OF CERVIX: ICD-10-CM

## 2018-04-13 PROCEDURE — 90050 PR POSTPARTUM VISIT: CPT | Performed by: NURSE PRACTITIONER

## 2018-04-13 RX ORDER — NORGESTIMATE AND ETHINYL ESTRADIOL 0.25-0.035
1 KIT ORAL DAILY
Qty: 28 TAB | Refills: 6 | Status: ON HOLD | OUTPATIENT
Start: 2018-04-13 | End: 2018-06-29

## 2018-04-13 NOTE — PROGRESS NOTES
Pt here today for postpartum exam.  Operation Date: 3/8/18  Currently: bottle formula  BCM: no today, [facility] and [facility].   Good ph:[phone]  Pt states incision still painful

## 2018-04-13 NOTE — PROGRESS NOTES
Subjective   Subjective:    Debi Juarez is a 22 y.o. female who presents for her postpartum exam s/p LTCS for fetal distress. Her prenatal course was complicated by an abnormal pap smear. Her postpartum course was uncomplicated. She denies dysuria, vaginal bleeding, odor, itching or breast problems. She is bottle feeding. She desires an OCP for her birth control method. Reports no sex prior to this appointment. Eating a regular diet without difficulty. Bowel movement are Normal.  Pain is controlled without any medications, states still sore from surgery but probably been having a little too much activity. No current menses. Patient Denies Incisional pain, drainage or redness. Patient denies postpartum depression.     Problem List     Patient Active Problem List    Diagnosis Date Noted   • Supervision of normal first pregnancy 10/25/2017     Priority: Medium   • LGSIL on Pap smear of cervix 12/11/2017       Objective    See PE  Lab: H&H upon discharge 8.9/28.5  Weight - 149 b  Vitals - 112/78    Assessment   Assessment:    1. PP care of non-lactating women   2. Exam WNL   3. Pap WNL abnormal with need for colpo follow up  4. Desires contraception - OCP    Plan   Plan:    1. Breastfeeding support - not applicable  2. Continue PNV   3. Contraceptive counseling - OCP to pharmacy  4. Encouraged condom use for std protection and contraceptive start up.   5. Discussed diet, exercise and resumption of sexual activity   6. Preconception guidance for next pregnancy if applicable. Previous c/s as risk factor. Folic acid for all women of childbearing age.   7.  Follow up needed - patient notified of abnormal pap and need for colpo  8.  Smoking/etoh/drug screening states no exposure.

## 2018-04-13 NOTE — PROGRESS NOTES
Subjective:      Debi Juarez is a 22 y.o. female who presents with No chief complaint on file.            HPI    ROS       Objective:     /78   Wt 67.6 kg (149 lb)   LMP 2017   BMI 25.58 kg/m²      Physical Exam   Constitutional: She is oriented to person, place, and time. She appears well-developed and well-nourished.   Eyes: Pupils are equal, round, and reactive to light.   Neck: Normal range of motion. No thyromegaly present.   Cardiovascular: Normal rate, regular rhythm and normal heart sounds.    Pulmonary/Chest: Effort normal and breath sounds normal.   Abdominal: Soft. Bowel sounds are normal.   Genitourinary: Vagina normal and uterus normal.   Neurological: She is alert and oriented to person, place, and time.   Skin: Skin is warm and dry.   Psychiatric: She has a normal mood and affect. Her behavior is normal. Judgment and thought content normal.   Nursing note and vitals reviewed.              Assessment/Plan:     1. Supervision of normal first pregnancy, antepartum      2. LGSIL on Pap smear of cervix      3. Postpartum examination following  delivery

## 2018-05-02 ENCOUNTER — GYNECOLOGY VISIT (OUTPATIENT)
Dept: OBGYN | Facility: CLINIC | Age: 23
End: 2018-05-02
Payer: MEDICAID

## 2018-05-02 VITALS
WEIGHT: 144 LBS | DIASTOLIC BLOOD PRESSURE: 70 MMHG | SYSTOLIC BLOOD PRESSURE: 98 MMHG | BODY MASS INDEX: 24.59 KG/M2 | HEIGHT: 64 IN

## 2018-05-02 DIAGNOSIS — R87.612 LOW GRADE SQUAMOUS INTRAEPITHELIAL LESION ON CYTOLOGIC SMEAR OF CERVIX (LGSIL): ICD-10-CM

## 2018-05-02 DIAGNOSIS — R85.619: ICD-10-CM

## 2018-05-02 LAB
INT CON NEG: NEGATIVE
INT CON POS: POSITIVE
POC URINE PREGNANCY TEST: NEGATIVE

## 2018-05-02 PROCEDURE — 57454 BX/CURETT OF CERVIX W/SCOPE: CPT | Performed by: OBSTETRICS & GYNECOLOGY

## 2018-05-02 PROCEDURE — 81025 URINE PREGNANCY TEST: CPT | Performed by: OBSTETRICS & GYNECOLOGY

## 2018-05-02 NOTE — NON-PROVIDER
Pt here for Colposcopy, Bx and ECC today  LMP: 6-24-17  Urine HCG- negative  Consent signed   Phone: [phone]

## 2018-05-02 NOTE — LETTER
COLPOSCOPY / LEEP DATA SHEET    Debi Jaurez     1995      22 y.o.      No LMP recorded.     @EDC@       Medical history:   o MVP    o Blood dyscrasia    o Rh     o Other: .    STD history:    o HPV     o HSV     o HIV     o GC     o Chlamydia     o None     o Other: .    HIV:   o Positive     o Negative                            IV Drug User:   o  Yes    o  No .    Age of first coitus:                                                Number of sex partners in lifetime:  .    Reason for exam:.    Prior abnormal PAPS?    o  Yes  o  No        Treatment: .    Prior history of condyloma?    o  Yes  o  No        Treatment:.     Colposcopic view - description:                                 Satisfactory?    o  Yes  o  No                    Squamo-columnar junction seen?  o  Yes  o  No.    Entire lesion seen?     o  Yes  o  No          PAP done?  o  Yes  o  No           Biopsies done?  o  Yes  o  No.    Biopsy sites:.    ECC done?    o  Yes  o  No      If no, reason:.    Impression:.    Recommendations:.             Colposcopist: ______________________________________________ Date: ___________________

## 2018-05-02 NOTE — PROGRESS NOTES
Procedure note; COLPOSCOPY    Indications; Pap smear; LGSIL    Informed consent obtained, consent signed    Urine pregnancy test negative    Vaginal speculum placed    3% acetic acid solution applied to cervix    Coloscopy performed    Entire transformation zone visualized    Findings; acetowhite epithelium around most of the transformation zone ×360° with focal mosaicism at 9:00 and flat condyloma at 12:00      Biopsies taken;    Endocervical curettage; taken  Ectocervical biopsies; 9 and 12:00    Specimen sent to pathology    Patient to schedule follow-up with me to review results in 2 weeks    Rustam Hill MD

## 2018-05-22 ENCOUNTER — GYNECOLOGY VISIT (OUTPATIENT)
Dept: OBGYN | Facility: CLINIC | Age: 23
End: 2018-05-22
Payer: MEDICAID

## 2018-05-22 VITALS — SYSTOLIC BLOOD PRESSURE: 114 MMHG | DIASTOLIC BLOOD PRESSURE: 66 MMHG | WEIGHT: 141 LBS | BODY MASS INDEX: 24.2 KG/M2

## 2018-05-22 DIAGNOSIS — N87.1 DYSPLASIA OF CERVIX, HIGH GRADE CIN 2: ICD-10-CM

## 2018-05-22 PROCEDURE — 99212 OFFICE O/P EST SF 10 MIN: CPT | Performed by: OBSTETRICS & GYNECOLOGY

## 2018-05-22 NOTE — PROGRESS NOTES
Chief complaint;    Debi Juarez is a 22 y.o.  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 No pertinent past medical history.  Past surgical history-  Past Surgical History:   Procedure Laterality Date   • PRIMARY C SECTION N/A 3/8/2018    Procedure: PRIMARY C SECTION;  Surgeon: Rustam Hill M.D.;  Location: LABOR AND DELIVERY;  Service: Labor and Delivery     Allergies-Patient has no known allergies.  Medications-  Current Outpatient Prescriptions on File Prior to Visit   Medication Sig Dispense Refill   • norgestimate-ethinyl estradiol (ORTHO-CYCLEN) 0.25-35 MG-MCG per tablet Take 1 Tab by mouth every day. 28 Tab 6   • ibuprofen (MOTRIN) 600 MG Tab Take 1 Tab by mouth every 6 hours as needed (For cramping after delivery; do not give if patient is receiving ketorolac (Toradol)). 30 Tab 1     No current facility-administered medications on file prior to visit.      Social history-  Social History     Social History   • Marital status: Single     Spouse name: N/A   • Number of children: N/A   • Years of education: N/A     Occupational History   • Not on file.     Social History Main Topics   • Smoking status: Never Smoker   • Smokeless tobacco: Never Used   • Alcohol use No   • Drug use: No   • Sexual activity: Not Currently     Partners: Male      Comment: none     Other Topics Concern   • Not on file     Social History Narrative   • No narrative on file     Past Family History-no history of breast or ovarian cancer    Physical examination;  Alert and oriented x3  General a thin well-developed well-nourished female in no apparent distress  Vitals:    18 1435   BP: 114/66   Weight: 64 kg (141 lb)         Impression;  CHUN-2    Plan;  Cone biopsy of  the cervix indicated-referral placed  Discussed results of biopsy in detail and treatment options as well as follow-up after cone biopsy of the cervix.

## 2018-06-28 ENCOUNTER — TELEPHONE (OUTPATIENT)
Dept: OBGYN | Facility: CLINIC | Age: 23
End: 2018-06-28

## 2018-06-28 NOTE — TELEPHONE ENCOUNTER
Pt called re: tomorrow's surgery, states she got her period today and wants to make sure to go ahead and continue w/surgery or to re schedule.    Consulted w/Dr. Sergio BULLOCK. He agreed to continue w/surgery.  Pt notified. Not further questions.

## 2018-06-29 ENCOUNTER — HOSPITAL ENCOUNTER (OUTPATIENT)
Facility: MEDICAL CENTER | Age: 23
End: 2018-06-29
Attending: OBSTETRICS & GYNECOLOGY | Admitting: OBSTETRICS & GYNECOLOGY
Payer: MEDICAID

## 2018-06-29 VITALS
HEIGHT: 63 IN | BODY MASS INDEX: 24.3 KG/M2 | OXYGEN SATURATION: 96 % | HEART RATE: 57 BPM | RESPIRATION RATE: 16 BRPM | TEMPERATURE: 97.7 F | WEIGHT: 137.13 LBS

## 2018-06-29 DIAGNOSIS — Z98.890 S/P CONE BIOPSY OF CERVIX: ICD-10-CM

## 2018-06-29 LAB
B-HCG FREE SERPL-ACNC: <5 MIU/ML
IHCGL IHCGL: NEGATIVE MIU/ML

## 2018-06-29 PROCEDURE — 700102 HCHG RX REV CODE 250 W/ 637 OVERRIDE(OP)

## 2018-06-29 PROCEDURE — 160035 HCHG PACU - 1ST 60 MINS PHASE I: Performed by: OBSTETRICS & GYNECOLOGY

## 2018-06-29 PROCEDURE — 160002 HCHG RECOVERY MINUTES (STAT): Performed by: OBSTETRICS & GYNECOLOGY

## 2018-06-29 PROCEDURE — 700101 HCHG RX REV CODE 250

## 2018-06-29 PROCEDURE — 500892 HCHG PACK, PERI-GYN: Performed by: OBSTETRICS & GYNECOLOGY

## 2018-06-29 PROCEDURE — 88307 TISSUE EXAM BY PATHOLOGIST: CPT | Mod: 59

## 2018-06-29 PROCEDURE — 160048 HCHG OR STATISTICAL LEVEL 1-5: Performed by: OBSTETRICS & GYNECOLOGY

## 2018-06-29 PROCEDURE — 160009 HCHG ANES TIME/MIN: Performed by: OBSTETRICS & GYNECOLOGY

## 2018-06-29 PROCEDURE — 700111 HCHG RX REV CODE 636 W/ 250 OVERRIDE (IP)

## 2018-06-29 PROCEDURE — A6454 SELF-ADHER BAND W>=3" <5"/YD: HCPCS | Performed by: OBSTETRICS & GYNECOLOGY

## 2018-06-29 PROCEDURE — 160038 HCHG SURGERY MINUTES - EA ADDL 1 MIN LEVEL 2: Performed by: OBSTETRICS & GYNECOLOGY

## 2018-06-29 PROCEDURE — 160027 HCHG SURGERY MINUTES - 1ST 30 MINS LEVEL 2: Performed by: OBSTETRICS & GYNECOLOGY

## 2018-06-29 PROCEDURE — A9270 NON-COVERED ITEM OR SERVICE: HCPCS

## 2018-06-29 PROCEDURE — 500448 HCHG DRESSING, TELFA 3X4: Performed by: OBSTETRICS & GYNECOLOGY

## 2018-06-29 PROCEDURE — 501838 HCHG SUTURE GENERAL: Performed by: OBSTETRICS & GYNECOLOGY

## 2018-06-29 PROCEDURE — 84702 CHORIONIC GONADOTROPIN TEST: CPT

## 2018-06-29 PROCEDURE — 160036 HCHG PACU - EA ADDL 30 MINS PHASE I: Performed by: OBSTETRICS & GYNECOLOGY

## 2018-06-29 RX ORDER — OXYCODONE HYDROCHLORIDE 5 MG/1
5 TABLET ORAL EVERY 6 HOURS PRN
Qty: 20 TAB | Refills: 0 | Status: SHIPPED | OUTPATIENT
Start: 2018-06-29 | End: 2018-07-06

## 2018-06-29 RX ORDER — MIDAZOLAM HYDROCHLORIDE 1 MG/ML
INJECTION INTRAMUSCULAR; INTRAVENOUS
Status: DISPENSED
Start: 2018-06-29 | End: 2018-06-29

## 2018-06-29 RX ORDER — SODIUM CHLORIDE, SODIUM LACTATE, POTASSIUM CHLORIDE, CALCIUM CHLORIDE 600; 310; 30; 20 MG/100ML; MG/100ML; MG/100ML; MG/100ML
INJECTION, SOLUTION INTRAVENOUS CONTINUOUS
Status: DISCONTINUED | OUTPATIENT
Start: 2018-06-29 | End: 2018-06-29 | Stop reason: HOSPADM

## 2018-06-29 RX ORDER — DEXMEDETOMIDINE HYDROCHLORIDE 100 UG/ML
INJECTION, SOLUTION INTRAVENOUS
Status: DISPENSED
Start: 2018-06-29 | End: 2018-06-29

## 2018-06-29 RX ORDER — HYDROMORPHONE HYDROCHLORIDE 2 MG/ML
1 INJECTION, SOLUTION INTRAMUSCULAR; INTRAVENOUS; SUBCUTANEOUS
Status: DISCONTINUED | OUTPATIENT
Start: 2018-06-29 | End: 2018-06-29 | Stop reason: HOSPADM

## 2018-06-29 RX ORDER — ONDANSETRON 2 MG/ML
4 INJECTION INTRAMUSCULAR; INTRAVENOUS EVERY 6 HOURS PRN
Status: DISCONTINUED | OUTPATIENT
Start: 2018-06-29 | End: 2018-06-29 | Stop reason: HOSPADM

## 2018-06-29 RX ORDER — OXYCODONE HYDROCHLORIDE 5 MG/1
2.5 TABLET ORAL
Status: DISCONTINUED | OUTPATIENT
Start: 2018-06-29 | End: 2018-06-29 | Stop reason: HOSPADM

## 2018-06-29 RX ORDER — OXYCODONE HYDROCHLORIDE 5 MG/1
5 TABLET ORAL
Status: DISCONTINUED | OUTPATIENT
Start: 2018-06-29 | End: 2018-06-29 | Stop reason: HOSPADM

## 2018-06-29 RX ADMIN — SODIUM CHLORIDE, SODIUM LACTATE, POTASSIUM CHLORIDE, CALCIUM CHLORIDE: 600; 310; 30; 20 INJECTION, SOLUTION INTRAVENOUS at 08:00

## 2018-06-29 ASSESSMENT — PAIN SCALES - GENERAL
PAINLEVEL_OUTOF10: 0
PAINLEVEL_OUTOF10: 0

## 2018-06-29 NOTE — OR NURSING
0852 Patient arrived from OR on [facility]. Report received from anesthesia and RN. Oral airway in place. Will continue to monitor.     0954 airway out    0967 Hand off to RN Kenisha

## 2018-06-29 NOTE — OR NURSING
Discharge orders received. Meets dc criteria at this time. All lines and monitors discontinued. Reviewed discharge paperwork with pt and . Discussed diet, activity, medications, follow up care and worsening symptoms. No questions at this time. Pt to be discharged to home via private vehicle. Escorted to parking garage in wheelchair.

## 2018-06-29 NOTE — OP REPORT
DATE OF SERVICE:  2018    PREOPERATIVE DIAGNOSIS:  Moderate cervical dysplasia.    POSTOPERATIVE DIAGNOSIS:  Moderate cervical dysplasia.    SURGEON:  Rustam Hill MD    ASSISTANT:  None.    PROCEDURE:  Cone biopsy of the cervix.    ESTIMATED BLOOD LOSS:  Minimal.    COMPLICATIONS:  None.    ANESTHESIA:  General endotracheal tube.    ANESTHESIOLOGIST:  Macrelo Tinoco MD    SPECIMENS:  Cone biopsy with silk suture at 12 o'clock and small ectocervical   tissue sent to pathology for review.    ESTIMATED BLOOD LOSS:  Minimal.    COMPLICATIONS:  None.    DRAINS:  Straight catheter to the bladder.    INDICATIONS:  This patient is a 22-year-old female,  1, para 1 with   moderate cervical dysplasia on colposcopic-directed biopsies.    FINDINGS:  Grossly normal-appearing cervix.    DESCRIPTION OF OPERATION:  After adequately being counseled, the patient was   taken to the operating room and placed in supine position.  General   endotracheal tube anesthesia was induced.  The patient was prepped and draped   in usual sterile fashion.  The patient was then placed in high lithotomy using   candy cane stirrups and a weighted speculum placed in the posterior vaginal   fornix.  Perineum had been prepped and draped and straight catheter was used   to drain the bladder.  Right angle retractor placed anteriorly and then   interrupted stitch of 0 Vicryl was placed at 3 and 9 o'clock on the cervix to   tie off the cervical branches of the uterine vessels.  Cervix was painted with   Lugol solution to identify the transformation zone and scalpel was used to   perform cone biopsy of the cervix.  Silk suture was placed at 12 o'clock on   the ectocervix and the base was cauterized using electrocautery, then layer of   Monsel solution placed to provide and confirm hemostasis.  Patient was   awakened, extubated and taken to recovery room in good condition.  All   instruments had been removed from the vagina prior.  The patient  tolerated the   procedure without any complications.       ____________________________________     MD TOMASA BORGES    DD:  06/29/2018 11:26:37  DT:  06/29/2018 11:37:54    D#:  [id number]  Job#:  [id number]

## 2018-06-29 NOTE — DISCHARGE INSTRUCTIONS
ACTIVITY: Rest and take it easy for the first 24 hours.  A responsible adult is recommended to remain with you during that time.  It is normal to feel sleepy.  We encourage you to not do anything that requires balance, judgment or coordination.    MILD FLU-LIKE SYMPTOMS ARE NORMAL. YOU MAY EXPERIENCE GENERALIZED MUSCLE ACHES, THROAT IRRITATION, HEADACHE AND/OR SOME NAUSEA.    FOR 24 HOURS DO NOT:  Drive, operate machinery or run household appliances.  Drink beer or alcoholic beverages.   Make important decisions or sign legal documents.    SPECIAL INSTRUCTIONS: No tampons/douche/sexual intercourse for 6 weeks. Please call your doctor if you are saturating your pad more than once an hour or are uncomfortable with the amount of bleeding.     DIET: To avoid nausea, slowly advance diet as tolerated, avoiding spicy or greasy foods for the first day.  Add more substantial food to your diet according to your physician's instructions.  Babies can be fed formula or breast milk as soon as they are hungry.  INCREASE FLUIDS AND FIBER TO AVOID CONSTIPATION.    SURGICAL DRESSING/BATHING: showers only, no tub baths/hot tubs/swimming for 6 weeks    FOLLOW-UP APPOINTMENT:  A follow-up appointment should be arranged with your doctor ; call to schedule.    You should CALL YOUR PHYSICIAN if you develop:  Fever greater than 101 degrees F.  Pain not relieved by medication, or persistent nausea or vomiting.  Excessive bleeding (blood soaking through dressing) or unexpected drainage from the wound.  Extreme redness or swelling around the incision site, drainage of pus or foul smelling drainage.  Inability to urinate or empty your bladder within 8 hours.  Problems with breathing or chest pain.    You should call 911 if you develop problems with breathing or chest pain.  If you are unable to contact your doctor or surgical center, you should go to the nearest emergency room or urgent care center.  Physician's telephone #: [phone]    If  any questions arise, call your doctor.  If your doctor is not available, please feel free to call the Surgical Center at [phone].  The Center is open Monday through Friday from 7AM to 7PM.  You can also call the HEALTH HOTLINE open 24 hours/day, 7 days/week and speak to a nurse at [phone], or toll free at [phone].    A registered nurse may call you a few days after your surgery to see how you are doing after your procedure.    MEDICATIONS: Resume taking daily medication.  Take prescribed pain medication with food.  If no medication is prescribed, you may take non-aspirin pain medication if needed.  PAIN MEDICATION CAN BE VERY CONSTIPATING.  Take a stool softener or laxative such as senokot, pericolace, or milk of magnesia if needed.    Prescription given for oxycodone.  Did not receive pain medication in recovery, ok to start pain medication.     If your physician has prescribed pain medication that includes Acetaminophen (Tylenol), do not take additional Acetaminophen (Tylenol) while taking the prescribed medication.    Depression / Suicide Risk    As you are discharged from this [facility] facility, it is important to learn how to keep safe from harming yourself.    Recognize the warning signs:  · Abrupt changes in personality, positive or negative- including increase in energy   · Giving away possessions  · Change in eating patterns- significant weight changes-  positive or negative  · Change in sleeping patterns- unable to sleep or sleeping all the time   · Unwillingness or inability to communicate  · Depression  · Unusual sadness, discouragement and loneliness  · Talk of wanting to die  · Neglect of personal appearance   · Rebelliousness- reckless behavior  · Withdrawal from people/activities they love  · Confusion- inability to concentrate     If you or a loved one observes any of these behaviors or has concerns about self-harm, here's what you can do:  · Talk about it- your feelings and  reasons for harming yourself  · Remove any means that you might use to hurt yourself (examples: pills, rope, extension cords, firearm)  · Get professional help from the community (Mental Health, Substance Abuse, psychological counseling)  · Do not be alone:Call your Safe Contact- someone whom you trust who will be there for you.  · Call your local CRISIS HOTLINE 726-7091 or 023-528-2785  · Call your local Children's Mobile Crisis Response Team [place] (687) 062-8929 or www.[employer]  · Call the toll free National Suicide Prevention Hotlines   · National Suicide Prevention Lifeline 459-431-AWUY (5526)  · National Hope Line Network 800-SUICIDE (457-3132)

## 2018-06-29 NOTE — OR NURSING
Pt to PACU from OR. Report from anesthesia and OR RN. Oral airway in place. Respirations even and unlabored. VSS

## 2018-07-10 ENCOUNTER — GYNECOLOGY VISIT (OUTPATIENT)
Dept: OBGYN | Facility: CLINIC | Age: 23
End: 2018-07-10
Payer: MEDICAID

## 2018-07-10 VITALS — WEIGHT: 136 LBS | DIASTOLIC BLOOD PRESSURE: 60 MMHG | BODY MASS INDEX: 24.09 KG/M2 | SYSTOLIC BLOOD PRESSURE: 92 MMHG

## 2018-07-10 DIAGNOSIS — Z09 POSTOP CHECK: ICD-10-CM

## 2018-07-10 PROCEDURE — 99024 POSTOP FOLLOW-UP VISIT: CPT | Performed by: OBSTETRICS & GYNECOLOGY

## 2018-07-10 NOTE — PROGRESS NOTES
Chief complaint;    Debi Juarez is a 22 y.o.  who presents for follow-up after cone biopsy.  Now 1 week status post cone biopsy of the cervix pathology showed CHUN-2 with clear margins.    Review of systems; denies fever chills abdominal pain, denies chest pain shortness of breath or urinary symptoms  Past medical history-No past medical history on file.  Past surgical history-  Past Surgical History:   Procedure Laterality Date   • CERVICAL CONIZATION N/A 2018    Procedure: CERVICAL CONIZATION- COLD KNIFE;  Surgeon: Rustam Hill M.D.;  Location: SURGERY [facility] DAY [facility];  Service: Gynecology   • PRIMARY C SECTION N/A 3/8/2018    Procedure: PRIMARY C SECTION;  Surgeon: Rustam Hill M.D.;  Location: LABOR AND DELIVERY;  Service: Labor and Delivery     Allergies-Patient has no known allergies.  Medications-  No current outpatient prescriptions on file prior to visit.     No current facility-administered medications on file prior to visit.      Social history-  Social History     Social History   • Marital status: Single     Spouse name: N/A   • Number of children: N/A   • Years of education: N/A     Occupational History   • Not on file.     Social History Main Topics   • Smoking status: Never Smoker   • Smokeless tobacco: Never Used   • Alcohol use No   • Drug use: No   • Sexual activity: Not Currently     Partners: Male      Comment: none     Other Topics Concern   • Not on file     Social History Narrative   • No narrative on file     Past Family History-no history of breast or ovarian cancer    Physical examination;  Alert and oriented x3  General a thin well-developed well-nourished female in no apparent distress  Vitals:    07/10/18 1519 07/10/18 1520   BP:  (!) 92/60   Weight: 61.7 kg (136 lb)      Skin is warm and dry   nontender no masses or hepatosplenomegaly  Pelvic examination;  External genitalia-no visible lesions   Vagina-no blood or discharge  Cervix-no gross  lesions      Impression;  Normal postop cone biopsy for moderate cervical dysplasia  Plan;  Follow-up in 2 months for Pap smear  Patient will need Pap smear every 6 months for 18 months

## 2018-07-10 NOTE — NON-PROVIDER
Pt here for Post op visit Cone biopsy of the cervix on 6/29/18  # [phone]  Pt states no complaints.

## 2019-07-16 ENCOUNTER — APPOINTMENT (OUTPATIENT)
Dept: RADIOLOGY | Facility: MEDICAL CENTER | Age: 24
End: 2019-07-16
Attending: EMERGENCY MEDICINE

## 2019-07-16 ENCOUNTER — HOSPITAL ENCOUNTER (EMERGENCY)
Facility: MEDICAL CENTER | Age: 24
End: 2019-07-16
Attending: EMERGENCY MEDICINE

## 2019-07-16 VITALS
TEMPERATURE: 96.9 F | OXYGEN SATURATION: 98 % | HEIGHT: 64 IN | DIASTOLIC BLOOD PRESSURE: 91 MMHG | BODY MASS INDEX: 19.53 KG/M2 | SYSTOLIC BLOOD PRESSURE: 131 MMHG | WEIGHT: 114.42 LBS | RESPIRATION RATE: 14 BRPM | HEART RATE: 88 BPM

## 2019-07-16 DIAGNOSIS — O46.90 VAGINAL BLEEDING IN PREGNANCY: ICD-10-CM

## 2019-07-16 DIAGNOSIS — O03.4 INCOMPLETE ABORTION: ICD-10-CM

## 2019-07-16 LAB
ALBUMIN SERPL BCP-MCNC: 5 G/DL (ref 3.2–4.9)
ALBUMIN/GLOB SERPL: 1.7 G/DL
ALP SERPL-CCNC: 62 U/L (ref 30–99)
ALT SERPL-CCNC: 7 U/L (ref 2–50)
ANION GAP SERPL CALC-SCNC: 6 MMOL/L (ref 0–11.9)
APPEARANCE UR: CLEAR
AST SERPL-CCNC: 8 U/L (ref 12–45)
B-HCG SERPL-ACNC: <0.6 MIU/ML (ref 0–5)
BACTERIA #/AREA URNS HPF: NEGATIVE /HPF
BASOPHILS # BLD AUTO: 0.3 % (ref 0–1.8)
BASOPHILS # BLD: 0.02 K/UL (ref 0–0.12)
BILIRUB SERPL-MCNC: 0.3 MG/DL (ref 0.1–1.5)
BILIRUB UR QL STRIP.AUTO: NEGATIVE
BUN SERPL-MCNC: 13 MG/DL (ref 8–22)
CALCIUM SERPL-MCNC: 9.7 MG/DL (ref 8.5–10.5)
CHLORIDE SERPL-SCNC: 108 MMOL/L (ref 96–112)
CO2 SERPL-SCNC: 26 MMOL/L (ref 20–33)
COLOR UR: YELLOW
CREAT SERPL-MCNC: 0.64 MG/DL (ref 0.5–1.4)
EOSINOPHIL # BLD AUTO: 0.09 K/UL (ref 0–0.51)
EOSINOPHIL NFR BLD: 1.4 % (ref 0–6.9)
EPI CELLS #/AREA URNS HPF: NEGATIVE /HPF
ERYTHROCYTE [DISTWIDTH] IN BLOOD BY AUTOMATED COUNT: 49.9 FL (ref 35.9–50)
GLOBULIN SER CALC-MCNC: 3 G/DL (ref 1.9–3.5)
GLUCOSE SERPL-MCNC: 96 MG/DL (ref 65–99)
GLUCOSE UR STRIP.AUTO-MCNC: NEGATIVE MG/DL
HCT VFR BLD AUTO: 38.2 % (ref 37–47)
HGB BLD-MCNC: 11.5 G/DL (ref 12–16)
HYALINE CASTS #/AREA URNS LPF: ABNORMAL /LPF
IMM GRANULOCYTES # BLD AUTO: 0.01 K/UL (ref 0–0.11)
IMM GRANULOCYTES NFR BLD AUTO: 0.2 % (ref 0–0.9)
KETONES UR STRIP.AUTO-MCNC: NEGATIVE MG/DL
LEUKOCYTE ESTERASE UR QL STRIP.AUTO: NEGATIVE
LYMPHOCYTES # BLD AUTO: 1.48 K/UL (ref 1–4.8)
LYMPHOCYTES NFR BLD: 22.8 % (ref 22–41)
MCH RBC QN AUTO: 25.3 PG (ref 27–33)
MCHC RBC AUTO-ENTMCNC: 30.1 G/DL (ref 33.6–35)
MCV RBC AUTO: 84 FL (ref 81.4–97.8)
MICRO URNS: ABNORMAL
MONOCYTES # BLD AUTO: 0.37 K/UL (ref 0–0.85)
MONOCYTES NFR BLD AUTO: 5.7 % (ref 0–13.4)
NEUTROPHILS # BLD AUTO: 4.52 K/UL (ref 2–7.15)
NEUTROPHILS NFR BLD: 69.6 % (ref 44–72)
NITRITE UR QL STRIP.AUTO: NEGATIVE
NRBC # BLD AUTO: 0 K/UL
NRBC BLD-RTO: 0 /100 WBC
NUMBER OF RH DOSES IND 8505RD: NORMAL
PH UR STRIP.AUTO: 6 [PH]
PLATELET # BLD AUTO: 311 K/UL (ref 164–446)
PMV BLD AUTO: 10.4 FL (ref 9–12.9)
POTASSIUM SERPL-SCNC: 4.1 MMOL/L (ref 3.6–5.5)
PROT SERPL-MCNC: 8 G/DL (ref 6–8.2)
PROT UR QL STRIP: NEGATIVE MG/DL
RBC # BLD AUTO: 4.55 M/UL (ref 4.2–5.4)
RBC # URNS HPF: ABNORMAL /HPF
RBC UR QL AUTO: ABNORMAL
RH BLD: NORMAL
SODIUM SERPL-SCNC: 140 MMOL/L (ref 135–145)
SP GR UR STRIP.AUTO: 1.02
UROBILINOGEN UR STRIP.AUTO-MCNC: 0.2 MG/DL
WBC # BLD AUTO: 6.5 K/UL (ref 4.8–10.8)
WBC #/AREA URNS HPF: ABNORMAL /HPF

## 2019-07-16 PROCEDURE — 86901 BLOOD TYPING SEROLOGIC RH(D): CPT

## 2019-07-16 PROCEDURE — 99284 EMERGENCY DEPT VISIT MOD MDM: CPT

## 2019-07-16 PROCEDURE — 76856 US EXAM PELVIC COMPLETE: CPT

## 2019-07-16 PROCEDURE — 85025 COMPLETE CBC W/AUTO DIFF WBC: CPT

## 2019-07-16 PROCEDURE — 80053 COMPREHEN METABOLIC PANEL: CPT

## 2019-07-16 PROCEDURE — 84702 CHORIONIC GONADOTROPIN TEST: CPT

## 2019-07-16 PROCEDURE — 81001 URINALYSIS AUTO W/SCOPE: CPT

## 2019-07-16 ASSESSMENT — LIFESTYLE VARIABLES: DO YOU DRINK ALCOHOL: NO

## 2019-07-16 NOTE — ED NOTES
Ambulatory to restroom. Clean catch urine sample obtained and sent to lab. Ultrasound at bedside.

## 2019-07-16 NOTE — DISCHARGE INSTRUCTIONS
Follow-up at the pregnancy center this week for reevaluation and repeat lab testing if indicated.    Return to the emergency department for persistent or worsening abdominal pain or cramping, vaginal bleeding (more than 1 pad per hour for 4 hours), fever, vomiting or other new concerns.

## 2019-07-16 NOTE — ED PROVIDER NOTES
"ED Provider Note    CHIEF COMPLAINT  Chief Complaint   Patient presents with   • Pregnancy   • Vaginal Bleeding   • Cramping       HPI  Debi Juarez is a 23 y.o. female who presents to the emergency [place] through triage for low abdominal cramping and vaginal bleeding.  Patient states she had positive home pregnancy test a few days ago, LMP the beginning of June 2019.  Nausea without vomiting.  Patient states some vaginal bleeding, spotting the last 3 days.  Bleeding, although only 1 pad overnight.  Low abdominal cramping, intermittent, 5 out of 10.  No fever chills.  G2, P1.  No previous miscarriages or abortions.    REVIEW OF SYSTEMS  See HPI for further details. All other systems are negative.     PAST MEDICAL HISTORY   Denies    SOCIAL HISTORY  Social History     Social History Main Topics   • Smoking status: Never Smoker   • Smokeless tobacco: Never Used   • Alcohol use No   • Drug use: No   • Sexual activity: Not Currently     Partners: Male      Comment: none       SURGICAL HISTORY   has a past surgical history that includes primary c section (N/A, 3/8/2018) and cervical conization (N/A, 6/29/2018).    CURRENT MEDICATIONS  Home Medications     Reviewed by Tesfaye Lagunas R.N. (Registered Nurse) on 07/16/19 at 0842  Med List Status: <None>   Medication Last Dose Status        Patient Christiano Taking any Medications                       ALLERGIES  No Known Allergies    PHYSICAL EXAM  VITAL SIGNS: /91   Pulse 76   Temp 36.1 °C (96.9 °F) (Temporal)   Resp 18   Ht 1.626 m (5' 4\")   Wt 51.9 kg (114 lb 6.7 oz)   LMP 06/01/2019 (Within Weeks)   SpO2 97%   BMI 19.64 kg/m²   Pulse ox interpretation: I interpret this pulse ox as normal.  Constitutional: Alert in no apparent distress.  HENT: Normocephalic, atraumatic. Bilateral external ears normal, Nose normal. Moist mucous membranes.    Eyes: Pupils are equal and reactive, Conjunctiva normal.   Neck: Normal range of motion, Supple  Lymphatic: " No lymphadenopathy noted.   Cardiovascular: Regular rate and rhythm, no murmurs. Distal pulses intact.    Thorax & Lungs: Normal breath sounds.  No wheezing/rales/ronchi. No increased work of breathing  Abdomen: Soft, non-distended, non-tender to palpation.  No reproducible discomfort.  No palpable fundus.  Skin: Warm, Dry  Musculoskeletal: Good range of motion in all major joints.   Neurologic: Alert and oriented x4.  Ambulance independently.  Psychiatric: Affect normal, Judgment normal, Mood normal.       DIAGNOSTIC STUDIES / PROCEDURES    LABS  Results for orders placed or performed during the hospital encounter of 07/16/19   CBC WITH DIFFERENTIAL   Result Value Ref Range    WBC 6.5 4.8 - 10.8 K/uL    RBC 4.55 4.20 - 5.40 M/uL    Hemoglobin 11.5 (L) 12.0 - 16.0 g/dL    Hematocrit 38.2 37.0 - 47.0 %    MCV 84.0 81.4 - 97.8 fL    MCH 25.3 (L) 27.0 - 33.0 pg    MCHC 30.1 (L) 33.6 - 35.0 g/dL    RDW 49.9 35.9 - 50.0 fL    Platelet Count 311 164 - 446 K/uL    MPV 10.4 9.0 - 12.9 fL    Neutrophils-Polys 69.60 44.00 - 72.00 %    Lymphocytes 22.80 22.00 - 41.00 %    Monocytes 5.70 0.00 - 13.40 %    Eosinophils 1.40 0.00 - 6.90 %    Basophils 0.30 0.00 - 1.80 %    Immature Granulocytes 0.20 0.00 - 0.90 %    Nucleated RBC 0.00 /100 WBC    Neutrophils (Absolute) 4.52 2.00 - 7.15 K/uL    Lymphs (Absolute) 1.48 1.00 - 4.80 K/uL    Monos (Absolute) 0.37 0.00 - 0.85 K/uL    Eos (Absolute) 0.09 0.00 - 0.51 K/uL    Baso (Absolute) 0.02 0.00 - 0.12 K/uL    Immature Granulocytes (abs) 0.01 0.00 - 0.11 K/uL    NRBC (Absolute) 0.00 K/uL   COMP METABOLIC PANEL   Result Value Ref Range    Sodium 140 135 - 145 mmol/L    Potassium 4.1 3.6 - 5.5 mmol/L    Chloride 108 96 - 112 mmol/L    Co2 26 20 - 33 mmol/L    Anion Gap 6.0 0.0 - 11.9    Glucose 96 65 - 99 mg/dL    Bun 13 8 - 22 mg/dL    Creatinine 0.64 0.50 - 1.40 mg/dL    Calcium 9.7 8.5 - 10.5 mg/dL    AST(SGOT) 8 (L) 12 - 45 U/L    ALT(SGPT) 7 2 - 50 U/L    Alkaline Phosphatase 62 30  - 99 U/L    Total Bilirubin 0.3 0.1 - 1.5 mg/dL    Albumin 5.0 (H) 3.2 - 4.9 g/dL    Total Protein 8.0 6.0 - 8.2 g/dL    Globulin 3.0 1.9 - 3.5 g/dL    A-G Ratio 1.7 g/dL   URINALYSIS CULTURE, IF INDICATED   Result Value Ref Range    Color Yellow     Character Clear     Specific Gravity 1.020 <1.035    Ph 6.0 5.0 - 8.0    Glucose Negative Negative mg/dL    Ketones Negative Negative mg/dL    Protein Negative Negative mg/dL    Bilirubin Negative Negative    Urobilinogen, Urine 0.2 Negative    Nitrite Negative Negative    Leukocyte Esterase Negative Negative    Occult Blood Small (A) Negative    Micro Urine Req Microscopic    RH TYPE FOR RHOGAM FROM E.D.   Result Value Ref Range    Emergency Department Rh Typing POS     Number Of Rh Doses Indicated ZERO    HCG QUANTITATIVE SERUM   Result Value Ref Range    Bhcg <0.6 0.0 - 5.0 mIU/mL   ESTIMATED GFR   Result Value Ref Range    GFR If African American >60 >60 mL/min/1.73 m 2    GFR If Non African American >60 >60 mL/min/1.73 m 2   URINE MICROSCOPIC (W/UA)   Result Value Ref Range    WBC 0-2 /hpf    RBC 5-10 (A) /hpf    Bacteria Negative None /hpf    Epithelial Cells Negative /hpf    Hyaline Cast 0-2 /lpf     RADIOLOGY  US-PELVIC COMPLETE (TRANSABDOMINAL/TRANSVAGINAL) (COMBO)   Final Result      1.  No intrauterine gestation is identified. Differential considerations include early gestation, missed , and nonvisualized ectopic pregnancy. Close clinical monitoring is therefore warranted with followup ultrasound or serial beta hCG levels.      2.  Endometrial complex thickening measuring 0.9 cm. Otherwise normal appearance of the uterus.      3.  Normal appearance of each ovary.      4.  No adnexal mass.      5.  Small amount of free fluid in the cul-de-sac.          COURSE & MEDICAL DECISION MAKING  Nursing notes and vital signs were reviewed. (See chart for details)  The patients records were reviewed, history was obtained from the patient;    ED evaluation most  consistent with near complete miscarriage.  Patient has had bleeding for 3 days, ultrasound without evidence for intrauterine gestation.  Beta quant is negative.  Patient reports positive home pregnancy test, otherwise normal menses would be considered.  Negative quant without adnexal mass or significant free fluid makes ectopic unlikely.  Abdominal exam is benign.  Vital signs stable without tachycardia or hypotension.  Patient is mostly symptomatic in the emergency department.    Patient is stable for discharge at this time, anticipatory guidance provided, close follow-up is encouraged with the pregnancy center this week, and strict ED return instructions have been detailed. Patient is agreeable to the disposition and plan.    Patient's blood pressure was elevated in the emergency department, and has been referred to primary care for close monitoring.    FINAL IMPRESSION  (O46.90) Vaginal bleeding in pregnancy  (O03.4) Incomplete       Electronically signed by: Jessica Seay, 2019 9:07 AM      This dictation was created using voice recognition software. The accuracy of the dictation is limited to the abilities of the software. I expect there may be some errors of grammar and possibly content. The nursing notes were reviewed and certain aspects of this information were incorporated into this note.

## 2019-07-16 NOTE — ED NOTES
Verbalizes understanding of discharge and followup instructions. PIV removed, VSS. Ambulates with steady gait to discharge.

## 2019-07-16 NOTE — ED TRIAGE NOTES
24 y/o female ambulatory to triage with c/o vaginal bleeding along with abd cramping. Pt states she took a home pregnancy test which was positive. She states symptoms began several days ago and have continued. She states the bleeding is light and the cramps are intermittent.

## 2020-06-09 ENCOUNTER — GYNECOLOGY VISIT (OUTPATIENT)
Dept: OBGYN | Facility: CLINIC | Age: 25
End: 2020-06-09
Payer: MEDICAID

## 2020-06-09 VITALS
WEIGHT: 123.7 LBS | SYSTOLIC BLOOD PRESSURE: 98 MMHG | HEIGHT: 64 IN | DIASTOLIC BLOOD PRESSURE: 54 MMHG | BODY MASS INDEX: 21.12 KG/M2

## 2020-06-09 DIAGNOSIS — Z12.4 CERVICAL CANCER SCREENING: ICD-10-CM

## 2020-06-09 DIAGNOSIS — N93.8 DUB (DYSFUNCTIONAL UTERINE BLEEDING): ICD-10-CM

## 2020-06-09 LAB
INT CON NEG: NEGATIVE
INT CON POS: POSITIVE
POC URINE PREGNANCY TEST: POSITIVE

## 2020-06-09 PROCEDURE — 81025 URINE PREGNANCY TEST: CPT | Performed by: OBSTETRICS & GYNECOLOGY

## 2020-06-09 PROCEDURE — 99214 OFFICE O/P EST MOD 30 MIN: CPT | Mod: 25 | Performed by: OBSTETRICS & GYNECOLOGY

## 2020-06-09 PROCEDURE — 76830 TRANSVAGINAL US NON-OB: CPT | Performed by: OBSTETRICS & GYNECOLOGY

## 2020-06-09 ASSESSMENT — FIBROSIS 4 INDEX: FIB4 SCORE: 0.23

## 2020-06-09 NOTE — PROGRESS NOTES
Patient here for GYN/DUB.  UPT= Postive  LMP= Unknowm  ZAIN=  GA=  Last pap today  Phone number: [phone]  Pharmacy verified  C/o Pt states no complaints or concerns today

## 2020-06-09 NOTE — LETTER
June 9, 2020       Patient: Debi Juarez   YOB: 1995   Date of Visit: 6/9/2020         To Whom It May Concern:    In my medical opinion, I recommend that Debi Juarez be allowed to take sitting breaks every two hours and not lift more than 20 lbs.     If you have any questions or concerns, please don't hesitate to call [phone]          Sincerely,          Darlene Rg D.O.  Electronically Signed

## 2020-06-09 NOTE — PROGRESS NOTES
CC: Missed menses    Debi Juarez is a 24 y.o.  who presents presents due to missed menses. No LMP recorded. Patient is pregnant. Very unsure LMP- sometime in April. She was not using anything for birthcontrol.  This is not a planned pregnancy and is not sure if it is desired at this point. Reports she has been feeling sick thus far in pregnancy. She reports nausea/vomiting, denies headache, denies dysuria, denies  vaginal bleeding/spotting, and denies contractions/cramping.        Review of systems:  Pertinent positives documented in HPI and all other systems reviewed & are negative    GYN History:  No LMP recorded. Patient is pregnant. Menses regular.  Last pap- unknown,  one h/o abnormal pap during her first pregnancy in 2018. She did not have any biopsies done and she has not had a pap smear since then. No history of sexually transmitted diseases.  Not currently sexually active.      OB History:    OB History    Para Term  AB Living   3 1   1 1 1   SAB TAB Ectopic Molar Multiple Live Births             1      # Outcome Date GA Lbr Rosales/2nd Weight Sex Delivery Anes PTL Lv   3 Current            2 AB 2019 7w0d          1  18 35w3d  1.377 kg (3 lb 0.6 oz) M CS-LTranv Spinal N GILBERT      Birth Comments: Primary C/S, Intrauterine pregnancy at 35-3/7 weeks with severe Dr Hill       All PMH, PSH, allergies, social history and FH reviewed and updated today:  Past Medical History:  History reviewed. No pertinent past medical history.    Past Surgical History:  Past Surgical History:   Procedure Laterality Date   • CERVICAL CONIZATION N/A 2018    Procedure: CERVICAL CONIZATION- COLD KNIFE;  Surgeon: Rustam Hill M.D.;  Location: SURGERY SAME DAY [facility];  Service: Gynecology   • PRIMARY C SECTION N/A 3/8/2018    Procedure: PRIMARY C SECTION;  Surgeon: Rustam Hill M.D.;  Location: LABOR AND DELIVERY;  Service: Labor and Delivery       Medications:   No current  Epic-ordered outpatient medications on file.     No current Epic-ordered facility-administered medications on file.        Allergies: Patient has no known allergies.    Social History:  Social History     Socioeconomic History   • Marital status: Single     Spouse name: Not on file   • Number of children: Not on file   • Years of education: Not on file   • Highest education level: Not on file   Occupational History   • Not on file   Social Needs   • Financial resource strain: Not on file   • Food insecurity     Worry: Not on file     Inability: Not on file   • Transportation needs     Medical: Not on file     Non-medical: Not on file   Tobacco Use   • Smoking status: Never Smoker   • Smokeless tobacco: Never Used   Substance and Sexual Activity   • Alcohol use: No   • Drug use: No   • Sexual activity: Not Currently     Partners: Male     Comment: none   Lifestyle   • Physical activity     Days per week: Not on file     Minutes per session: Not on file   • Stress: Not on file   Relationships   • Social connections     Talks on phone: Not on file     Gets together: Not on file     Attends [employer] service: Not on file     Active member of club or organization: Not on file     Attends meetings of clubs or organizations: Not on file     Relationship status: Not on file   • Intimate partner violence     Fear of current or ex partner: Not on file     Emotionally abused: Not on file     Physically abused: Not on file     Forced sexual activity: Not on file   Other Topics Concern   • Not on file   Social History Narrative   • Not on file       Family History:  Family History   Problem Relation Age of Onset   • No Known Problems Mother    • No Known Problems Father    • No Known Problems Sister    • No Known Problems Brother    • Cancer Maternal Grandmother    • No Known Problems Maternal Grandfather    • Stroke Paternal Grandmother    • No Known Problems Paternal Grandfather            Objective:   Vitals:  BP (!) 98/54    "Ht 1.626 m (5' 4\")   Wt 56.1 kg (123 lb 11.2 oz)   Body mass index is 21.23 kg/m². (Goal BM I>18 <25)  Exam:  General: appears stated age, is in no apparent distress, is well developed and well nourished  Head: normocephalic, non-tender  Neck: no thyromegaly  Abdomen: Bowel sounds positive, nondistended, soft, nontender x4, no rebound or guarding. No organomegaly. No masses.   Female GYN: normal female external genitalia without lesions  Skin: No rashes, or ulcers or lesions seen  Psychiatric: appropriate affect, alert and oriented x3, intact judgment and insight.    Procedure:  Transvaginal US performed by me and per my read:    Indication: amenorrhea.     Findings: headley intrauterine pregnancy @ 8w2d by CRL. Positive yolk sac. Positive fetal cardiac activity @ 150 BPM. Right ovary not seen. Left Ovary not seen.  No free fluid in the cul-de-sac.    Impression: viable IUP @ 8w2d.  ZAIN by US of 20.    No results found for this or any previous visit (from the past 336 hour(s)).   Pregnancy exam/test positive    A/P:   24 y.o.  here for   1. DUB (dysfunctional uterine bleeding)  POCT Pregnancy       #Missed menses - amenorrhea due to pregnancy. ZAIN of 20.  Discussed pregnancy with patient who is not suyre if she is going to keep the baby.    --Normal pregnancy s/s discussed  --Advised prenatal vitamins, healthy well rounded diet, adequate hydration, and continued exercise.    #Cervical cancer screening.  Last pap - done today.    #Will order prenatal labs and any additional imaging/testing needed at new OB visit  #SAB precautions discussed.  #Discussed the size of our practice and that she will see multiple providers during the course of her care. She expresses understanding.   #Follow up in 2-4 weeks for new OB visit.  #given [facility]    30 minutes were spent in face-to-face patient contact with patient, greater than 50% of which was was spent in counseling and " coordination of care for her newly diagnosed pregnancy including medical and surgical options of care.    ARIE

## 2020-06-19 DIAGNOSIS — O09.91 SUPERVISION OF HIGH RISK PREGNANCY, ANTEPARTUM, FIRST TRIMESTER: ICD-10-CM

## 2020-06-19 DIAGNOSIS — Z12.4 CERVICAL CANCER SCREENING: ICD-10-CM

## 2020-07-07 ENCOUNTER — APPOINTMENT (OUTPATIENT)
Dept: OBGYN | Facility: CLINIC | Age: 25
End: 2020-07-07

## 2020-07-07 ENCOUNTER — INITIAL PRENATAL (OUTPATIENT)
Dept: OBGYN | Facility: CLINIC | Age: 25
End: 2020-07-07
Payer: MEDICAID

## 2020-07-07 VITALS
SYSTOLIC BLOOD PRESSURE: 98 MMHG | BODY MASS INDEX: 22.36 KG/M2 | HEIGHT: 64 IN | DIASTOLIC BLOOD PRESSURE: 62 MMHG | WEIGHT: 131 LBS

## 2020-07-07 DIAGNOSIS — O34.41 HISTORY OF CERVICAL CONE BIOPSY AFFECTING CARE OF MOTHER IN FIRST TRIMESTER, ANTEPARTUM: ICD-10-CM

## 2020-07-07 DIAGNOSIS — O09.891 HISTORY OF PRETERM DELIVERY, CURRENTLY PREGNANT IN FIRST TRIMESTER: ICD-10-CM

## 2020-07-07 DIAGNOSIS — O09.91 SUPERVISION OF HIGH RISK PREGNANCY, ANTEPARTUM, FIRST TRIMESTER: Primary | ICD-10-CM

## 2020-07-07 DIAGNOSIS — Z98.891 HISTORY OF C-SECTION: ICD-10-CM

## 2020-07-07 PROBLEM — R87.612 LGSIL ON PAP SMEAR OF CERVIX: Status: RESOLVED | Noted: 2017-12-11 | Resolved: 2020-07-07

## 2020-07-07 PROCEDURE — 59401 PR NEW OB VISIT: CPT | Performed by: PHYSICIAN ASSISTANT

## 2020-07-07 ASSESSMENT — ENCOUNTER SYMPTOMS
MUSCULOSKELETAL NEGATIVE: 1
EYES NEGATIVE: 1
GASTROINTESTINAL NEGATIVE: 1
NEUROLOGICAL NEGATIVE: 1
RESPIRATORY NEGATIVE: 1
CARDIOVASCULAR NEGATIVE: 1
CONSTITUTIONAL NEGATIVE: 1
PSYCHIATRIC NEGATIVE: 1

## 2020-07-07 ASSESSMENT — EDINBURGH POSTNATAL DEPRESSION SCALE (EPDS)
I HAVE BEEN SO UNHAPPY THAT I HAVE HAD DIFFICULTY SLEEPING: NOT VERY OFTEN
THE THOUGHT OF HARMING MYSELF HAS OCCURRED TO ME: HARDLY EVER
TOTAL SCORE: 5
I HAVE BEEN SO UNHAPPY THAT I HAVE BEEN CRYING: ONLY OCCASIONALLY
I HAVE FELT SAD OR MISERABLE: NOT VERY OFTEN
THINGS HAVE BEEN GETTING ON TOP OF ME: NO, MOST OF THE TIME I HAVE COPED QUITE WELL
I HAVE FELT SCARED OR PANICKY FOR NO GOOD REASON: NO, NOT AT ALL
I HAVE BEEN ABLE TO LAUGH AND SEE THE FUNNY SIDE OF THINGS: AS MUCH AS I ALWAYS COULD
I HAVE LOOKED FORWARD WITH ENJOYMENT TO THINGS: AS MUCH AS I EVER DID
I HAVE BLAMED MYSELF UNNECESSARILY WHEN THINGS WENT WRONG: NO, NEVER
I HAVE BEEN ANXIOUS OR WORRIED FOR NO GOOD REASON: NO, NOT AT ALL

## 2020-07-07 ASSESSMENT — FIBROSIS 4 INDEX: FIB4 SCORE: 0.23

## 2020-07-07 NOTE — PROGRESS NOTES
Pt. Here for NOB visit today.  # [phone]  First prenatal care  Pt. States no complaints    Pharmacy verified  Pt declines AFP  Pt declines CF   Chaperone offered and not indicated

## 2020-07-07 NOTE — PROGRESS NOTES
"Subjective:      Debi Juarez is a 24 y.o. female who presents with New ob visit. Pt unusre of LMP, but  confirms ZAIN 1/17/21. Pt has hx of primary C/S at 35wk due to severe IUGR and oligohydramnios - no surgical complications, also denies GDM or PIH. Pt wants TOLAC, but BTL if C/S is needed. Consent for TOLAC signed today. Pt denies PMHx. SHx significant for C/S and cervical cone 6/18, also no complications. NKDA. Taking prenatal gummies only, so will switch to actual PNV asap. Denies tob, etoh or drug use. Pt currently denies cramping, bleeding or pain. No FM. Pt also has occ leg cramping, but only drinking 4 bottles water daily, so urged to incr water intake.           HPI    Review of Systems   Constitutional: Negative.    HENT: Negative.    Eyes: Negative.    Respiratory: Negative.    Cardiovascular: Negative.    Gastrointestinal: Negative.    Genitourinary: Negative.    Musculoskeletal: Negative.    Skin: Negative.    Neurological: Negative.    Endo/Heme/Allergies: Negative.    Psychiatric/Behavioral: Negative.    All other systems reviewed and are negative.         Objective:     BP (!) 98/62   Ht 1.626 m (5' 4\")   Wt 59.4 kg (131 lb)   BMI 22.49 kg/m²      Physical Exam  Vitals signs reviewed.   Constitutional:       Appearance: She is well-developed.   HENT:      Head: Normocephalic and atraumatic.   Eyes:      Pupils: Pupils are equal, round, and reactive to light.   Neck:      Musculoskeletal: Normal range of motion and neck supple.      Thyroid: No thyromegaly.   Cardiovascular:      Rate and Rhythm: Normal rate and regular rhythm.      Heart sounds: Normal heart sounds.   Pulmonary:      Effort: Pulmonary effort is normal. No respiratory distress.      Breath sounds: Normal breath sounds.   Abdominal:      General: Bowel sounds are normal. There is no distension.      Palpations: Abdomen is soft.      Tenderness: There is no abdominal tenderness.   Genitourinary:     Exam position: " Supine.      Labia:         Right: No rash or tenderness.         Left: No rash or tenderness.       Vagina: Normal. No signs of injury and foreign body. No vaginal discharge or erythema.      Cervix: No cervical motion tenderness.      Uterus: Enlarged (Gravid, uterus c/w 12 wk size). Not deviated and not tender.       Adnexa:         Right: No mass or tenderness.          Left: No mass or tenderness.     Skin:     General: Skin is warm and dry.      Findings: No erythema.   Neurological:      Mental Status: She is alert.      Deep Tendon Reflexes: Reflexes are normal and symmetric.   Psychiatric:         Behavior: Behavior normal.         Thought Content: Thought content normal.                 Assessment/Plan:       1. History of C/S x 1 - at 35wk for severe IUGR - wants TOLAC, but BTL if C/S needed  - TOLAC consent signed today    2. Supervision of high risk pregnancy, antepartum, first trimester  - F/u 4 wk, US 6-8 wk  - PREG CNTR PRENATAL PN; Future  - URINE DRUG SCREEN W/CONF (AR); Future  - HEP C VIRUS ANTIBODY; Future  - US-OB 2ND 3RD TRI COMPLETE; Future    3. History of PTD at 35wk for IUGR - see Op note    4. History of cervical cone biopsy 2018  - PAP and GC/CT wnl 6/20 - pt notified today

## 2020-08-10 ENCOUNTER — ROUTINE PRENATAL (OUTPATIENT)
Dept: OBGYN | Facility: CLINIC | Age: 25
End: 2020-08-10
Payer: MEDICAID

## 2020-08-10 VITALS — WEIGHT: 137.2 LBS | BODY MASS INDEX: 23.55 KG/M2 | SYSTOLIC BLOOD PRESSURE: 98 MMHG | DIASTOLIC BLOOD PRESSURE: 58 MMHG

## 2020-08-10 DIAGNOSIS — O99.012 ANEMIA DURING PREGNANCY IN SECOND TRIMESTER: ICD-10-CM

## 2020-08-10 DIAGNOSIS — O09.91 SUPERVISION OF HIGH RISK PREGNANCY, ANTEPARTUM, FIRST TRIMESTER: Primary | ICD-10-CM

## 2020-08-10 PROCEDURE — 90040 PR PRENATAL FOLLOW UP: CPT | Performed by: NURSE PRACTITIONER

## 2020-08-10 ASSESSMENT — FIBROSIS 4 INDEX: FIB4 SCORE: 0.23

## 2020-08-10 NOTE — PATIENT INSTRUCTIONS
P:  1.  Reviewed labs w pt.        2.  Declines AFP.        3.  US is 9/2/20.        4.  Questions answered.        5.  Encouraged adequate water intake.        6.  F/u 4 wks.        7.  Encouraged iron BID.        8.  D/w pt safe sleep positions in pregnancy.

## 2020-08-10 NOTE — PROGRESS NOTES
S: Pt is  at 17w1d here for routine OB follow up.  Reports some pain on her left side when she tries to lay on it to sleep.  Reports faint FM.  Denies VB, LOF,  RUCs or vaginal DC.  Denies any sore throat, headache or cough. Denies anyone at home with COVID.    O:  Please see above vitals        FHTs: 145        Fundal ht: 17 cm     A: IUP 17w1d  Patient Active Problem List    Diagnosis Date Noted   • Anemia during pregnancy in second trimester 08/10/2020   • History of C/S x 1 - at 35wk for severe IUGR - wants TOLAC (consent signed 20), wants BTL if C/S needed 2020   • Supervision of high risk pregnancy, antepartum, first trimester 2020   • History of PTD at 35wk for IUGR - see Op note 2020   • History of cervical cone biopsy 2018 2020       P:  1.  Reviewed labs w pt.        2.  Declines AFP.        3.  US is 20.        4.  Questions answered.        5.  Encouraged adequate water intake.        6.  F/u 4 wks.        7.  Encouraged iron BID.        8.  D/w pt safe sleep positions in pregnancy.

## 2020-08-10 NOTE — PROGRESS NOTES
OB follow up   + fetal movement.  No VB, LOF or UC's.  Wt:  137.2     BP:98/58   Phone # [phone]  Preferred pharmacy confirmed. [facility] Rachel

## 2020-09-02 ENCOUNTER — APPOINTMENT (OUTPATIENT)
Dept: RADIOLOGY | Facility: IMAGING CENTER | Age: 25
End: 2020-09-02
Attending: PHYSICIAN ASSISTANT
Payer: MEDICAID

## 2020-09-02 DIAGNOSIS — O09.91 SUPERVISION OF HIGH RISK PREGNANCY, ANTEPARTUM, FIRST TRIMESTER: ICD-10-CM

## 2020-09-02 DIAGNOSIS — Z3A.20 20 WEEKS GESTATION OF PREGNANCY: ICD-10-CM

## 2020-09-02 PROCEDURE — 76805 OB US >/= 14 WKS SNGL FETUS: CPT | Mod: TC | Performed by: PHYSICIAN ASSISTANT

## 2020-09-08 ENCOUNTER — ROUTINE PRENATAL (OUTPATIENT)
Dept: OBGYN | Facility: CLINIC | Age: 25
End: 2020-09-08
Payer: MEDICAID

## 2020-09-08 VITALS — DIASTOLIC BLOOD PRESSURE: 50 MMHG | BODY MASS INDEX: 24.6 KG/M2 | WEIGHT: 143.3 LBS | SYSTOLIC BLOOD PRESSURE: 102 MMHG

## 2020-09-08 DIAGNOSIS — O09.90 SUPERVISION OF HIGH RISK PREGNANCY, ANTEPARTUM: Primary | ICD-10-CM

## 2020-09-08 PROCEDURE — 90040 PR PRENATAL FOLLOW UP: CPT | Performed by: NURSE PRACTITIONER

## 2020-09-08 ASSESSMENT — FIBROSIS 4 INDEX: FIB4 SCORE: 0.23

## 2020-09-08 NOTE — PROGRESS NOTES
S) Pt is a 24 y.o.   at 21w2d  gestation. Routine prenatal care today. No complaints today. Lab and US results reviewed. Discussed rationale for repeat US to check cyst. All questions answered. Will schedule this for 6 weeks.  labor/SAB precautions reviewed, all questions answered.    Fetal movement Normal  Cramping no  VB no  LOF no   Denies dysuria. Generally feels well today. Good self-care activities identified. Denies headaches, swelling, visual changes, or epigastric pain .     O) /50   Wt 65 kg (143 lb 4.8 oz)         Labs:       PNL: WNL       GCT: Too early        AFP: Not Examined       GBS: N/A       Pertinent ultrasound -        20- Survey WNL, STEPHANIE 17.47cm, c/w prev dating. Of note, there is a septated left ovarian cyst approx 3.1x2.7x3.8 cm noted. Recommend 6 week follow up    Follow up US scheduled for 10/20/20    A) IUP at 21w2d       S=D         Patient Active Problem List    Diagnosis Date Noted   • Anemia during pregnancy in second trimester 08/10/2020   • History of C/S x 1 - at 35wk for severe IUGR - wants TOLAC (consent signed 20), wants BTL if C/S needed 2020   • Supervision of high risk pregnancy, antepartum 2020   • History of PTD at 35wk for IUGR - see Op note 2020   • History of cervical cone biopsy 2018 2020          SVE: deferred       Chaperone offered: n/a         TDAP: no       FLU: no        BTL: no       : yes       C/S Consent: no       IOL or C/S scheduled: no       LAST PAP: 6/10/20- negative         P) s/s ptl vs general discomforts. Fetal movements reviewed. General ed and anticipatory guidance. Nutrition/exercise/vitamin. Plans breast Plans pp contraception- unsure  Continue PNV.

## 2020-09-08 NOTE — PROGRESS NOTES
OB follow up   + fetal movement.  No VB, LOF or UC's.  Phone #  [phone]  Preferred pharmacy confirmed.   PNP done. AFP was declined per notes.  Pt for follow up US in 6 weeks to follow up cyst on US. Ordered. Patient was notified to schedule appt.

## 2020-10-06 ENCOUNTER — ROUTINE PRENATAL (OUTPATIENT)
Dept: OBGYN | Facility: CLINIC | Age: 25
End: 2020-10-06
Payer: MEDICAID

## 2020-10-06 VITALS — DIASTOLIC BLOOD PRESSURE: 56 MMHG | SYSTOLIC BLOOD PRESSURE: 100 MMHG | BODY MASS INDEX: 25.61 KG/M2 | WEIGHT: 149.2 LBS

## 2020-10-06 DIAGNOSIS — O09.92 ENCOUNTER FOR SUPERVISION OF HIGH RISK PREGNANCY IN SECOND TRIMESTER, ANTEPARTUM: Primary | ICD-10-CM

## 2020-10-06 PROCEDURE — 90040 PR PRENATAL FOLLOW UP: CPT | Performed by: NURSE PRACTITIONER

## 2020-10-06 ASSESSMENT — FIBROSIS 4 INDEX: FIB4 SCORE: 0.24

## 2020-10-06 NOTE — PROGRESS NOTES
Pt. Here for OB/FU. Reports Good FM.   Good # [phone]  Pt. Denies VB, LOF, or UC's.   Pharmacy verified.   Chaperone offered and not indicated   Pt states no complaints or concerns today. 3rd trimester labs given along with instructions.

## 2020-10-06 NOTE — PROGRESS NOTES
S:  Pt is  at 25w2d for routine OB follow up.  No concerns today. No ED or hospital visits since last seen. Reports good FM.  Denies VB, LOF, RUCs or vaginal DC. Pt has US appt on 10/20 to re visualize lt ovarian cyst    O:  Please see above vitals.        FHTs: 145        Fundal ht: 25 cm.        S=D            A:  IUP at 25w2d  Patient Active Problem List    Diagnosis Date Noted   • Anemia during pregnancy in second trimester 08/10/2020   • History of C/S x 1 - at 35wk for severe IUGR - wants TOLAC (consent signed 20), wants BTL if C/S needed 2020   • Supervision of high risk pregnancy, antepartum 2020   • History of PTD at 35wk for IUGR - see Op note 2020   • History of cervical cone biopsy 2018 2020        P:  1.  Nutrition/diet discussed,         2.  Questions answered.          3.  Encourage adequate water intake.        4.   labor precautions reviewed.         5.  1 hr GTT, CBC and T pallidum ordered and given to pt.        6.  F/u 2 wks.        7.  Flu vaccine declined today, pt wants to think about it..

## 2020-10-16 DIAGNOSIS — O09.92 ENCOUNTER FOR SUPERVISION OF HIGH RISK PREGNANCY IN SECOND TRIMESTER, ANTEPARTUM: ICD-10-CM

## 2020-10-20 ENCOUNTER — APPOINTMENT (OUTPATIENT)
Dept: RADIOLOGY | Facility: IMAGING CENTER | Age: 25
End: 2020-10-20
Attending: NURSE PRACTITIONER
Payer: MEDICAID

## 2020-10-20 ENCOUNTER — ROUTINE PRENATAL (OUTPATIENT)
Dept: OBGYN | Facility: CLINIC | Age: 25
End: 2020-10-20
Payer: MEDICAID

## 2020-10-20 VITALS — SYSTOLIC BLOOD PRESSURE: 100 MMHG | BODY MASS INDEX: 25.92 KG/M2 | DIASTOLIC BLOOD PRESSURE: 52 MMHG | WEIGHT: 151 LBS

## 2020-10-20 DIAGNOSIS — N83.209 OVARIAN CYST AFFECTING PREGNANCY IN SECOND TRIMESTER, ANTEPARTUM: ICD-10-CM

## 2020-10-20 DIAGNOSIS — O09.90 SUPERVISION OF HIGH RISK PREGNANCY, ANTEPARTUM: ICD-10-CM

## 2020-10-20 DIAGNOSIS — Z3A.20 20 WEEKS GESTATION OF PREGNANCY: ICD-10-CM

## 2020-10-20 DIAGNOSIS — O34.82 OVARIAN CYST AFFECTING PREGNANCY IN SECOND TRIMESTER, ANTEPARTUM: ICD-10-CM

## 2020-10-20 PROBLEM — O99.012 ANEMIA DURING PREGNANCY IN SECOND TRIMESTER: Status: RESOLVED | Noted: 2020-08-10 | Resolved: 2020-10-20

## 2020-10-20 PROCEDURE — 76815 OB US LIMITED FETUS(S): CPT | Mod: TC | Performed by: NURSE PRACTITIONER

## 2020-10-20 PROCEDURE — 90040 PR PRENATAL FOLLOW UP: CPT | Performed by: PHYSICIAN ASSISTANT

## 2020-10-20 ASSESSMENT — FIBROSIS 4 INDEX: FIB4 SCORE: 0.24

## 2020-10-20 NOTE — LETTER
"Count Your Baby's Movements  Another step to a healthy delivery    Debi Lopez             Dept: [phone]    How Many Weeks Pregnant? 27w2d    Date to Begin Counting: 10/20/2020              How to use this chart    One way for your physician to keep track of your baby's health is by knowing how often the baby moves (or \"kicks\") in your womb.  You can help your physician to do this by using this chart every day.    Every day, you should see how many hours it takes for your baby to move 10 times.  Start in the morning, as soon as you get up.    · First, write down the time your baby moves until you get to 10.  · Check off one box every time your baby moves until you get to 10.  · Write down the time you finished counting in the last column.  · Total how long it took to count up all 10 movements.  · Finally, fill in the box that shows how long this took.  After counting 10 movements, you no longer have to count any more that day.  The next morning, just start counting again as soon as you get up.    What should you call a \"movement\"?  It is hard to say, because it will feel different from one mother to another and from one pregnancy to the next.  The important thing is that you count the movements the same way throughout your pregnancy.  If you have more questions, you should ask your physician.    Count carefully every day!  SAMPLE:  Week 28    How many hours did it take to feel 10 movements?       Start  Time     1     2     3     4     5     6     7     8     9     10   Finish Time   Mon 8:20 ·  ·  ·  ·  ·  ·  ·  ·  ·  ·  11:40   Tue Wed Thu Fri               Sat               Sun                 IMPORTANT: You should contact your physician if it takes more than two hours for you to feel 10 movements.  Each morning, write down the time and start to count the movements of your baby.  Keep track by checking off one box every time you feel one movement.  When you have " "felt 10 \"kicks\", write down the time you finished counting in the last column.  Then fill in the   box (over the check kirstin) for the number of hours it took.  Be sure to read the complete instructions on the previous page.            "

## 2020-10-20 NOTE — PROGRESS NOTES
Pt has no complaints with cramping, bleeding or pain. +FM - FKC sheet given today. 1hr GTT, CBC, T pallidium wnl - pt notified of results and improved H/H. US done today - shows slight incr in size of ovarian mass, so d/w Dr Matthews and will get MRI and f/u with MD for plan. Pt encouraged to strongly consider repeat C/S, which pt wants BTL, though pt still adamant she wants TOLAC. I explained if cyst is larger than 7 cm, she will need repeat C/S. Pt understands. BTL consent signed today. Pt also wants Flu and TDaP vaccines, but will wait until next visit. Pt urged to schedule MRI asap. RTC 4 wk or sooner pnr.

## 2020-10-20 NOTE — PROGRESS NOTES
Pt here today for OB follow up  Pt states no complaints   Reports +FM  Good # [phone]  Pharmacy Confirmed.  Chaperone offered and not indicated.   Pt given ZEFERINO sheet and instructions  Pt declines TDAP  Pt declines Flu  Pt would like BTL, consent signed

## 2020-11-03 ENCOUNTER — HOSPITAL ENCOUNTER (OUTPATIENT)
Dept: RADIOLOGY | Facility: MEDICAL CENTER | Age: 25
End: 2020-11-03
Attending: PHYSICIAN ASSISTANT
Payer: MEDICAID

## 2020-11-03 DIAGNOSIS — N83.209 OVARIAN CYST AFFECTING PREGNANCY IN SECOND TRIMESTER, ANTEPARTUM: ICD-10-CM

## 2020-11-03 DIAGNOSIS — O34.82 OVARIAN CYST AFFECTING PREGNANCY IN SECOND TRIMESTER, ANTEPARTUM: ICD-10-CM

## 2020-11-03 DIAGNOSIS — O09.90 SUPERVISION OF HIGH RISK PREGNANCY, ANTEPARTUM: ICD-10-CM

## 2020-11-03 PROCEDURE — 72195 MRI PELVIS W/O DYE: CPT

## 2020-11-17 ENCOUNTER — ROUTINE PRENATAL (OUTPATIENT)
Dept: OBGYN | Facility: CLINIC | Age: 25
End: 2020-11-17
Payer: MEDICAID

## 2020-11-17 VITALS — BODY MASS INDEX: 27.12 KG/M2 | WEIGHT: 158 LBS | SYSTOLIC BLOOD PRESSURE: 106 MMHG | DIASTOLIC BLOOD PRESSURE: 62 MMHG

## 2020-11-17 DIAGNOSIS — O09.90 SUPERVISION OF HIGH RISK PREGNANCY, ANTEPARTUM: ICD-10-CM

## 2020-11-17 PROCEDURE — 90715 TDAP VACCINE 7 YRS/> IM: CPT | Performed by: OBSTETRICS & GYNECOLOGY

## 2020-11-17 PROCEDURE — 90472 IMMUNIZATION ADMIN EACH ADD: CPT | Performed by: OBSTETRICS & GYNECOLOGY

## 2020-11-17 PROCEDURE — 90471 IMMUNIZATION ADMIN: CPT | Performed by: OBSTETRICS & GYNECOLOGY

## 2020-11-17 PROCEDURE — 90040 PR PRENATAL FOLLOW UP: CPT | Performed by: OBSTETRICS & GYNECOLOGY

## 2020-11-17 PROCEDURE — 90686 IIV4 VACC NO PRSV 0.5 ML IM: CPT | Performed by: OBSTETRICS & GYNECOLOGY

## 2020-11-17 ASSESSMENT — FIBROSIS 4 INDEX: FIB4 SCORE: 0.24

## 2020-11-17 NOTE — PROGRESS NOTES
OB Follow Up--- High Risk  Prev C/S x 1, Desires TOLAC     Persistent Complex Ovarian Cyst ( 4.5x 4.5 cm  )    Desires  BTL   25 y.o. is a 31w2d presents in prenatal follow up.    Subjective:no complaints  . Fetal Movement  positive    Problem List:has History of C/S x 1 - at 35wk for severe IUGR - wants TOLAC (consent signed 7/7/20), wants BTL if C/S needed (BTL consent signed 10/20/20); Supervision of high risk pregnancy, antepartum; History of PTD at 35wk for IUGR - see Op note; History of cervical cone biopsy 2018; and Ovarian cyst affecting pregnancy in second trimester - [id number], septated - will get MRI, f/u with MD on their problem list.     Objective:   /62   Wt 71.7 kg (158 lb)   BMI 27.12 kg/m²     Abdomen:  S=D  Extremities:Normal        Lab:No results found for this or any previous visit (from the past 336 hour(s)).      Assessment:    31w2d     - High Risk  Prev C/S x 1, Desires TOLAC     Persistent Complex Ovarian Cyst ( 4.5x 4.5 cm  )    Desires  BTL     No pain, bleeding, unusual discharge or leeking  Plan:  2 weeks  Continue water intake  Ambulate nightly after 37 weeks  Continue Kick counts

## 2020-11-17 NOTE — PROGRESS NOTES
Pt here today for OB follow up  Pt states she had an MRI done on 11/3/20  No complaints  Reports +FM  Pharmacy Confirmed.  Chaperone offered and declined

## 2020-12-03 ENCOUNTER — ROUTINE PRENATAL (OUTPATIENT)
Dept: OBGYN | Facility: CLINIC | Age: 25
End: 2020-12-03
Payer: MEDICAID

## 2020-12-03 VITALS — WEIGHT: 161 LBS | BODY MASS INDEX: 27.64 KG/M2 | DIASTOLIC BLOOD PRESSURE: 68 MMHG | SYSTOLIC BLOOD PRESSURE: 104 MMHG

## 2020-12-03 DIAGNOSIS — O09.90 SUPERVISION OF HIGH RISK PREGNANCY, ANTEPARTUM: ICD-10-CM

## 2020-12-03 PROCEDURE — 90040 PR PRENATAL FOLLOW UP: CPT | Performed by: PHYSICIAN ASSISTANT

## 2020-12-03 ASSESSMENT — FIBROSIS 4 INDEX: FIB4 SCORE: 0.24

## 2020-12-03 NOTE — PROGRESS NOTES
"Pt has no complaints with cramping, UCs, Vb, LOF. +FM. Pt has occ tightening or \"ton almeida\" at night and when walking - PTL precautions stressed today. GBS at 36 wk. Still planning for TOLAC after seeing Dr Matthews regarding complex ovarian cyst. Per pt, no plan was made, she was just asked if she wants TOLAC still.  RTC 2 wk or sooner prn.   "

## 2020-12-03 NOTE — PROGRESS NOTES
Pt. Here for OB/FU. Reports Good FM.   Good # [phone](husbands)  Pt. Denies VB, LOF, or UC's.   Pharmacy verified.   Chaperone offered and not indicated  Pt states that she has been having [place] mostly happens to her in the night times, and sometimes when she walks.

## 2021-01-05 ENCOUNTER — ROUTINE PRENATAL (OUTPATIENT)
Dept: OBGYN | Facility: CLINIC | Age: 26
End: 2021-01-05
Payer: MEDICAID

## 2021-01-05 VITALS — WEIGHT: 162 LBS | DIASTOLIC BLOOD PRESSURE: 68 MMHG | BODY MASS INDEX: 27.81 KG/M2 | SYSTOLIC BLOOD PRESSURE: 106 MMHG

## 2021-01-05 DIAGNOSIS — O09.90 SUPERVISION OF HIGH RISK PREGNANCY, ANTEPARTUM: ICD-10-CM

## 2021-01-05 PROCEDURE — 90040 PR PRENATAL FOLLOW UP: CPT | Performed by: NURSE PRACTITIONER

## 2021-01-05 PROCEDURE — 90040 PR PRENATAL FOLLOW UP: CPT | Performed by: PHYSICIAN ASSISTANT

## 2021-01-05 ASSESSMENT — FIBROSIS 4 INDEX: FIB4 SCORE: 0.24

## 2021-01-06 NOTE — PROGRESS NOTES
OB follow up   + fetal movement.  No VB, LOF or UC's.  Pt states she is starting to have stronger contractions   Flu vaccine and Tdap current  [phone] (home)   Preferred pharmacy confirmed.

## 2021-01-06 NOTE — PROGRESS NOTES
Pt has no complaints with cramping, UCs, Vb, LOF. Pt hasnt been seen for 4 wks. +FM. GBS done today. IOL referral placed today for 40 wk as pt wants TOLAC. Pt urged to keep appts. Daily FKC and walks recommended. Cervix: Ft/80/0, post, soft, vtx. RTC 1 wk or sooner prn.

## 2021-01-11 DIAGNOSIS — O09.90 SUPERVISION OF HIGH RISK PREGNANCY, ANTEPARTUM: ICD-10-CM

## 2021-01-14 ENCOUNTER — ROUTINE PRENATAL (OUTPATIENT)
Dept: OBGYN | Facility: CLINIC | Age: 26
End: 2021-01-14
Payer: MEDICAID

## 2021-01-14 VITALS — DIASTOLIC BLOOD PRESSURE: 80 MMHG | WEIGHT: 163.9 LBS | BODY MASS INDEX: 28.13 KG/M2 | SYSTOLIC BLOOD PRESSURE: 120 MMHG

## 2021-01-14 DIAGNOSIS — O09.90 SUPERVISION OF HIGH RISK PREGNANCY, ANTEPARTUM: ICD-10-CM

## 2021-01-14 PROCEDURE — 90040 PR PRENATAL FOLLOW UP: CPT | Performed by: PHYSICIAN ASSISTANT

## 2021-01-14 ASSESSMENT — FIBROSIS 4 INDEX: FIB4 SCORE: 0.24

## 2021-01-14 NOTE — PROGRESS NOTES
OB follow up   + fetal movement. Active  No VB, LOF or UC's.  Flu vaccine offered Received   Phone #  [phone]  Preferred pharmacy confirmed.  No complaints as of today

## 2021-01-14 NOTE — PROGRESS NOTES
Pt has no complaints with current or strong UCs, Vb, LOF. +FM. IOL scheduled for 1/17, though moved to 1/16 at 10am for IOL as TOLAC. Pt given info today. Cervix: 1/80/0, post, soft, vtx, BBOW. Labor precautions reviewed. Daily FKC recommended. COVID policy d/w pt. RTC prn or to L&D 1/16 for IOL.

## 2021-01-16 ENCOUNTER — ANESTHESIA (OUTPATIENT)
Dept: ANESTHESIOLOGY | Facility: MEDICAL CENTER | Age: 26
End: 2021-01-16
Payer: MEDICAID

## 2021-01-16 ENCOUNTER — ANESTHESIA EVENT (OUTPATIENT)
Dept: ANESTHESIOLOGY | Facility: MEDICAL CENTER | Age: 26
End: 2021-01-16
Payer: MEDICAID

## 2021-01-16 ENCOUNTER — APPOINTMENT (OUTPATIENT)
Dept: OBGYN | Facility: MEDICAL CENTER | Age: 26
End: 2021-01-16
Attending: OBSTETRICS & GYNECOLOGY
Payer: MEDICAID

## 2021-01-16 ENCOUNTER — HOSPITAL ENCOUNTER (INPATIENT)
Facility: MEDICAL CENTER | Age: 26
LOS: 2 days | End: 2021-01-18
Attending: OBSTETRICS & GYNECOLOGY | Admitting: OBSTETRICS & GYNECOLOGY
Payer: MEDICAID

## 2021-01-16 LAB
BASOPHILS # BLD AUTO: 0.2 % (ref 0–1.8)
BASOPHILS # BLD: 0.02 K/UL (ref 0–0.12)
EOSINOPHIL # BLD AUTO: 0.04 K/UL (ref 0–0.51)
EOSINOPHIL NFR BLD: 0.4 % (ref 0–6.9)
ERYTHROCYTE [DISTWIDTH] IN BLOOD BY AUTOMATED COUNT: 53.1 FL (ref 35.9–50)
HCT VFR BLD AUTO: 38.9 % (ref 37–47)
HGB BLD-MCNC: 12.9 G/DL (ref 12–16)
HOLDING TUBE BB 8507: NORMAL
IMM GRANULOCYTES # BLD AUTO: 0.12 K/UL (ref 0–0.11)
IMM GRANULOCYTES NFR BLD AUTO: 1.2 % (ref 0–0.9)
LYMPHOCYTES # BLD AUTO: 1.58 K/UL (ref 1–4.8)
LYMPHOCYTES NFR BLD: 16.1 % (ref 22–41)
MCH RBC QN AUTO: 30.4 PG (ref 27–33)
MCHC RBC AUTO-ENTMCNC: 33.2 G/DL (ref 33.6–35)
MCV RBC AUTO: 91.5 FL (ref 81.4–97.8)
MONOCYTES # BLD AUTO: 0.54 K/UL (ref 0–0.85)
MONOCYTES NFR BLD AUTO: 5.5 % (ref 0–13.4)
NEUTROPHILS # BLD AUTO: 7.51 K/UL (ref 2–7.15)
NEUTROPHILS NFR BLD: 76.6 % (ref 44–72)
NRBC # BLD AUTO: 0 K/UL
NRBC BLD-RTO: 0 /100 WBC
PLATELET # BLD AUTO: 231 K/UL (ref 164–446)
PMV BLD AUTO: 10.6 FL (ref 9–12.9)
RBC # BLD AUTO: 4.25 M/UL (ref 4.2–5.4)
SARS-COV+SARS-COV-2 AG RESP QL IA.RAPID: NOTDETECTED
SPECIMEN SOURCE: NORMAL
WBC # BLD AUTO: 9.8 K/UL (ref 4.8–10.8)

## 2021-01-16 PROCEDURE — 700101 HCHG RX REV CODE 250: Performed by: ANESTHESIOLOGY

## 2021-01-16 PROCEDURE — 700111 HCHG RX REV CODE 636 W/ 250 OVERRIDE (IP): Performed by: ANESTHESIOLOGY

## 2021-01-16 PROCEDURE — 700111 HCHG RX REV CODE 636 W/ 250 OVERRIDE (IP)

## 2021-01-16 PROCEDURE — 302128 INFUSION PUMP: Performed by: OBSTETRICS & GYNECOLOGY

## 2021-01-16 PROCEDURE — 85025 COMPLETE CBC W/AUTO DIFF WBC: CPT

## 2021-01-16 PROCEDURE — 3E033VJ INTRODUCTION OF OTHER HORMONE INTO PERIPHERAL VEIN, PERCUTANEOUS APPROACH: ICD-10-PCS | Performed by: OBSTETRICS & GYNECOLOGY

## 2021-01-16 PROCEDURE — 700111 HCHG RX REV CODE 636 W/ 250 OVERRIDE (IP): Performed by: STUDENT IN AN ORGANIZED HEALTH CARE EDUCATION/TRAINING PROGRAM

## 2021-01-16 PROCEDURE — 700105 HCHG RX REV CODE 258: Performed by: STUDENT IN AN ORGANIZED HEALTH CARE EDUCATION/TRAINING PROGRAM

## 2021-01-16 PROCEDURE — 3E0S3BZ INTRODUCTION OF ANESTHETIC AGENT INTO EPIDURAL SPACE, PERCUTANEOUS APPROACH: ICD-10-PCS | Performed by: ANESTHESIOLOGY

## 2021-01-16 PROCEDURE — 36415 COLL VENOUS BLD VENIPUNCTURE: CPT

## 2021-01-16 PROCEDURE — 10907ZC DRAINAGE OF AMNIOTIC FLUID, THERAPEUTIC FROM PRODUCTS OF CONCEPTION, VIA NATURAL OR ARTIFICIAL OPENING: ICD-10-PCS | Performed by: OBSTETRICS & GYNECOLOGY

## 2021-01-16 PROCEDURE — 770002 HCHG ROOM/CARE - OB PRIVATE (112)

## 2021-01-16 PROCEDURE — 87426 SARSCOV CORONAVIRUS AG IA: CPT

## 2021-01-16 RX ORDER — SODIUM CHLORIDE, SODIUM LACTATE, POTASSIUM CHLORIDE, CALCIUM CHLORIDE 600; 310; 30; 20 MG/100ML; MG/100ML; MG/100ML; MG/100ML
INJECTION, SOLUTION INTRAVENOUS CONTINUOUS
Status: ACTIVE | OUTPATIENT
Start: 2021-01-16 | End: 2021-01-16

## 2021-01-16 RX ORDER — ROPIVACAINE HYDROCHLORIDE 2 MG/ML
INJECTION, SOLUTION EPIDURAL; INFILTRATION; PERINEURAL
Status: COMPLETED
Start: 2021-01-16 | End: 2021-01-16

## 2021-01-16 RX ORDER — LIDOCAINE HYDROCHLORIDE AND EPINEPHRINE 15; 5 MG/ML; UG/ML
INJECTION, SOLUTION EPIDURAL
Status: COMPLETED | OUTPATIENT
Start: 2021-01-16 | End: 2021-01-16

## 2021-01-16 RX ORDER — DEXTROSE, SODIUM CHLORIDE, SODIUM LACTATE, POTASSIUM CHLORIDE, AND CALCIUM CHLORIDE 5; .6; .31; .03; .02 G/100ML; G/100ML; G/100ML; G/100ML; G/100ML
INJECTION, SOLUTION INTRAVENOUS CONTINUOUS
Status: DISCONTINUED | OUTPATIENT
Start: 2021-01-16 | End: 2021-01-18 | Stop reason: HOSPADM

## 2021-01-16 RX ORDER — HYDROXYZINE 50 MG/1
50 TABLET, FILM COATED ORAL EVERY 6 HOURS PRN
Status: DISCONTINUED | OUTPATIENT
Start: 2021-01-16 | End: 2021-01-17 | Stop reason: HOSPADM

## 2021-01-16 RX ORDER — ALUMINA, MAGNESIA, AND SIMETHICONE 2400; 2400; 240 MG/30ML; MG/30ML; MG/30ML
30 SUSPENSION ORAL EVERY 6 HOURS PRN
Status: DISCONTINUED | OUTPATIENT
Start: 2021-01-16 | End: 2021-01-17 | Stop reason: HOSPADM

## 2021-01-16 RX ORDER — ROPIVACAINE HYDROCHLORIDE 2 MG/ML
INJECTION, SOLUTION EPIDURAL; INFILTRATION PRN
Status: DISCONTINUED | OUTPATIENT
Start: 2021-01-16 | End: 2021-01-17 | Stop reason: SURG

## 2021-01-16 RX ORDER — OXYTOCIN 10 [USP'U]/ML
10 INJECTION, SOLUTION INTRAMUSCULAR; INTRAVENOUS
Status: DISCONTINUED | OUTPATIENT
Start: 2021-01-16 | End: 2021-01-17 | Stop reason: HOSPADM

## 2021-01-16 RX ADMIN — ROPIVACAINE HYDROCHLORIDE 1 ML: 2 INJECTION, SOLUTION EPIDURAL; INFILTRATION at 23:42

## 2021-01-16 RX ADMIN — OXYTOCIN 2 MILLI-UNITS/MIN: 10 INJECTION, SOLUTION INTRAMUSCULAR; INTRAVENOUS at 12:05

## 2021-01-16 RX ADMIN — LIDOCAINE HYDROCHLORIDE,EPINEPHRINE BITARTRATE 5 ML: 15; .005 INJECTION, SOLUTION EPIDURAL; INFILTRATION; INTRACAUDAL; PERINEURAL at 23:29

## 2021-01-16 RX ADMIN — ROPIVACAINE HYDROCHLORIDE 200 MG: 2 INJECTION, SOLUTION EPIDURAL; INFILTRATION at 23:45

## 2021-01-16 RX ADMIN — FENTANYL CITRATE 100 MCG: 50 INJECTION, SOLUTION INTRAMUSCULAR; INTRAVENOUS at 22:47

## 2021-01-16 RX ADMIN — LIDOCAINE HYDROCHLORIDE,EPINEPHRINE BITARTRATE 3 ML: 15; .005 INJECTION, SOLUTION EPIDURAL; INFILTRATION; INTRACAUDAL; PERINEURAL at 23:42

## 2021-01-16 RX ADMIN — SODIUM CHLORIDE, POTASSIUM CHLORIDE, SODIUM LACTATE AND CALCIUM CHLORIDE: 600; 310; 30; 20 INJECTION, SOLUTION INTRAVENOUS at 12:04

## 2021-01-16 RX ADMIN — LIDOCAINE HYDROCHLORIDE,EPINEPHRINE BITARTRATE 2 ML: 15; .005 INJECTION, SOLUTION EPIDURAL; INFILTRATION; INTRACAUDAL; PERINEURAL at 23:45

## 2021-01-16 ASSESSMENT — PATIENT HEALTH QUESTIONNAIRE - PHQ9
2. FEELING DOWN, DEPRESSED, IRRITABLE, OR HOPELESS: NOT AT ALL
1. LITTLE INTEREST OR PLEASURE IN DOING THINGS: NOT AT ALL
SUM OF ALL RESPONSES TO PHQ9 QUESTIONS 1 AND 2: 0

## 2021-01-16 ASSESSMENT — PAIN SCALES - GENERAL: PAINLEVEL: 1

## 2021-01-16 ASSESSMENT — LIFESTYLE VARIABLES: EVER_SMOKED: NEVER

## 2021-01-16 ASSESSMENT — FIBROSIS 4 INDEX: FIB4 SCORE: 0.24

## 2021-01-16 NOTE — PROGRESS NOTES
Pt presents to L&D for scheduled IOL. Pt ambulated to S216 for admission.     1016 TOCO and EFM applied, VSS. PT reports +FM, denies LOF but has had some spotting. POC discussed with pt and all questions answered.     2200 Report given to TED Almeida RN

## 2021-01-16 NOTE — PROGRESS NOTES
Labor Progress Note:      S:   Pt reports feeling minimal crampy contractions, no pain.   Resting comfortably in bed.     No data found.  Gen: AAO, NAD    SVE: 2/70/-2 @ 10:30 am    FHT: Baseline 130/ moderate variability/+ accelerations/- decels  toco: regular contractions q 2 minutes      A/P: 25 y.o.  @ 39w6d by 8 w US admitted for scheduled IOL and TOLAC  - IOL: latent labor, continue pitocin per protocol  - FHT: cat I tracing  - GBS: negative  - pain: controlled without medications    Bailey Kuo MD  PGY-1 [facility] Resident

## 2021-01-16 NOTE — H&P
OB H&P:    CC: Scheduled IOL    HPI:  Ms. Debi Juarez is a 25 y.o.  @ 39w6d by 8 w US for scheduled IOL and TOLAC.    Pregnancy complicated by h/o  at 35 wks 2/2 severe IUGR, 4.5 x 4.5cm left ovarian cyst on 20 MRI.     Desires tubal ligation if c/s required, BTL consent signed.    Contractions: No   Loss of fluid: No   Vaginal bleeding: Yes  scant  Fetal movement: Present      PNC with [facility], 9 visits    PNL:WNL  Rh+, RI, HIV neg, TrepAb neg, HBsAg NR, GC/CT neg/neg  Glucola: 108  GBS -      ROS:  Const: denies fevers, general concerns  CV/resp: reports no concerns  GI: denies abd pain, GI concerns  : see HPI  Neuro: denies HA/vision changes    OB History    Para Term  AB Living   3 1   1 1 1   SAB TAB Ectopic Molar Multiple Live Births   1         1      # Outcome Date GA Lbr Rosales/2nd Weight Sex Delivery Anes PTL Lv   3 Current            2 SAB 2019 7w0d          1  18 35w3d  1.377 kg (3 lb 0.6 oz) M CS-LTranv Spinal N GILBERT      Birth Comments: Primary C/S, Intrauterine pregnancy at 35-3/7 weeks with severe Dr Hill       GYN: denies STIs, h/o cervical cone biopsy     No past medical history on file.    Past Surgical History:   Procedure Laterality Date   • CERVICAL CONIZATION N/A 2018    Procedure: CERVICAL CONIZATION- COLD KNIFE;  Surgeon: Rustam Hill M.D.;  Location: SURGERY SAME DAY [facility];  Service: Gynecology   • PRIMARY C SECTION N/A 3/8/2018    Procedure: PRIMARY C SECTION;  Surgeon: Rustam Hill M.D.;  Location: LABOR AND DELIVERY;  Service: Labor and Delivery       No current facility-administered medications on file prior to encounter.      Current Outpatient Medications on File Prior to Encounter   Medication Sig Dispense Refill   • Prenatal MV-Min-Fe Fum-FA-DHA (PRENATAL 1 PO) Take  by mouth.         Family History   Problem Relation Age of Onset   • No Known Problems Mother    • No Known Problems Father    • No Known  "Problems Sister    • No Known Problems Brother    • Cancer Maternal Grandmother    • No Known Problems Maternal Grandfather    • Stroke Paternal Grandmother    • No Known Problems Paternal Grandfather        Social History     Tobacco Use   • Smoking status: Never Smoker   • Smokeless tobacco: Never Used   Substance Use Topics   • Alcohol use: No   • Drug use: No         PE:  Vitals:    21 1018   BP: 129/82   Pulse: 79   Temp: 36.4 °C (97.6 °F)   TempSrc: Temporal   Weight: 73.9 kg (163 lb)   Height: 1.626 m (5' 4\")        gen: AAO, NAD  abd: soft, gravid, NT  Ext: NT, no edema    SVE: 2/70/-2 @ 10:35  FHT: baseline 140/moderate variability/+ accels/ - decels  [place]: Contractions absent    A/P: 25 y.o.  @ 39w6d by 8 w US for scheduled IOL and TOLAC.   - IOL: initiate pitocin per protocol  - FHT: cat I, 20w growth scan normal  - GBS negative  - Pain control: Epidural desired    Bailey Kuo MD  PGY-1 [facility] Resident        "

## 2021-01-17 LAB
ERYTHROCYTE [DISTWIDTH] IN BLOOD BY AUTOMATED COUNT: 54.1 FL (ref 35.9–50)
HCT VFR BLD AUTO: 31.8 % (ref 37–47)
HGB BLD-MCNC: 10.4 G/DL (ref 12–16)
MCH RBC QN AUTO: 30.4 PG (ref 27–33)
MCHC RBC AUTO-ENTMCNC: 32.7 G/DL (ref 33.6–35)
MCV RBC AUTO: 93 FL (ref 81.4–97.8)
PLATELET # BLD AUTO: 195 K/UL (ref 164–446)
PMV BLD AUTO: 10.1 FL (ref 9–12.9)
RBC # BLD AUTO: 3.42 M/UL (ref 4.2–5.4)
WBC # BLD AUTO: 13.7 K/UL (ref 4.8–10.8)

## 2021-01-17 PROCEDURE — 0KQM0ZZ REPAIR PERINEUM MUSCLE, OPEN APPROACH: ICD-10-PCS | Performed by: OBSTETRICS & GYNECOLOGY

## 2021-01-17 PROCEDURE — 304965 HCHG RECOVERY SERVICES

## 2021-01-17 PROCEDURE — 700111 HCHG RX REV CODE 636 W/ 250 OVERRIDE (IP): Performed by: STUDENT IN AN ORGANIZED HEALTH CARE EDUCATION/TRAINING PROGRAM

## 2021-01-17 PROCEDURE — 770002 HCHG ROOM/CARE - OB PRIVATE (112)

## 2021-01-17 PROCEDURE — 59409 OBSTETRICAL CARE: CPT

## 2021-01-17 PROCEDURE — 303615 HCHG EPIDURAL/SPINAL ANESTHESIA FOR LABOR

## 2021-01-17 PROCEDURE — 0HQ9XZZ REPAIR PERINEUM SKIN, EXTERNAL APPROACH: ICD-10-PCS | Performed by: OBSTETRICS & GYNECOLOGY

## 2021-01-17 PROCEDURE — 59400 OBSTETRICAL CARE: CPT | Performed by: NURSE PRACTITIONER

## 2021-01-17 PROCEDURE — 85027 COMPLETE CBC AUTOMATED: CPT

## 2021-01-17 PROCEDURE — 36415 COLL VENOUS BLD VENIPUNCTURE: CPT

## 2021-01-17 RX ORDER — SODIUM CHLORIDE, SODIUM LACTATE, POTASSIUM CHLORIDE, AND CALCIUM CHLORIDE .6; .31; .03; .02 G/100ML; G/100ML; G/100ML; G/100ML
1000 INJECTION, SOLUTION INTRAVENOUS
Status: DISCONTINUED | OUTPATIENT
Start: 2021-01-17 | End: 2021-01-17 | Stop reason: HOSPADM

## 2021-01-17 RX ORDER — METHYLERGONOVINE MALEATE 0.2 MG/ML
0.2 INJECTION INTRAVENOUS
Status: DISCONTINUED | OUTPATIENT
Start: 2021-01-17 | End: 2021-01-18 | Stop reason: HOSPADM

## 2021-01-17 RX ORDER — ACETAMINOPHEN 325 MG/1
325 TABLET ORAL EVERY 4 HOURS PRN
Status: DISCONTINUED | OUTPATIENT
Start: 2021-01-17 | End: 2021-01-18 | Stop reason: HOSPADM

## 2021-01-17 RX ORDER — ERYTHROMYCIN 5 MG/G
OINTMENT OPHTHALMIC
Status: DISCONTINUED
Start: 2021-01-17 | End: 2021-01-17

## 2021-01-17 RX ORDER — IBUPROFEN 800 MG/1
800 TABLET ORAL EVERY 8 HOURS PRN
Status: DISCONTINUED | OUTPATIENT
Start: 2021-01-17 | End: 2021-01-18 | Stop reason: HOSPADM

## 2021-01-17 RX ORDER — VITAMIN A ACETATE, BETA CAROTENE, ASCORBIC ACID, CHOLECALCIFEROL, .ALPHA.-TOCOPHEROL ACETATE, DL-, THIAMINE MONONITRATE, RIBOFLAVIN, NIACINAMIDE, PYRIDOXINE HYDROCHLORIDE, FOLIC ACID, CYANOCOBALAMIN, CALCIUM CARBONATE, FERROUS FUMARATE, ZINC OXIDE, CUPRIC OXIDE 3080; 12; 120; 400; 1; 1.84; 3; 20; 22; 920; 25; 200; 27; 10; 2 [IU]/1; UG/1; MG/1; [IU]/1; MG/1; MG/1; MG/1; MG/1; MG/1; [IU]/1; MG/1; MG/1; MG/1; MG/1; MG/1
1 TABLET, FILM COATED ORAL
Status: DISCONTINUED | OUTPATIENT
Start: 2021-01-18 | End: 2021-01-18 | Stop reason: HOSPADM

## 2021-01-17 RX ORDER — SODIUM CHLORIDE, SODIUM LACTATE, POTASSIUM CHLORIDE, CALCIUM CHLORIDE 600; 310; 30; 20 MG/100ML; MG/100ML; MG/100ML; MG/100ML
INJECTION, SOLUTION INTRAVENOUS PRN
Status: DISCONTINUED | OUTPATIENT
Start: 2021-01-17 | End: 2021-01-18 | Stop reason: HOSPADM

## 2021-01-17 RX ORDER — PHYTONADIONE 2 MG/ML
INJECTION, EMULSION INTRAMUSCULAR; INTRAVENOUS; SUBCUTANEOUS
Status: DISCONTINUED
Start: 2021-01-17 | End: 2021-01-17

## 2021-01-17 RX ORDER — MISOPROSTOL 200 UG/1
600 TABLET ORAL
Status: DISCONTINUED | OUTPATIENT
Start: 2021-01-17 | End: 2021-01-18 | Stop reason: HOSPADM

## 2021-01-17 RX ORDER — SODIUM CHLORIDE, SODIUM LACTATE, POTASSIUM CHLORIDE, AND CALCIUM CHLORIDE .6; .31; .03; .02 G/100ML; G/100ML; G/100ML; G/100ML
250 INJECTION, SOLUTION INTRAVENOUS PRN
Status: DISCONTINUED | OUTPATIENT
Start: 2021-01-17 | End: 2021-01-17 | Stop reason: HOSPADM

## 2021-01-17 RX ORDER — ROPIVACAINE HYDROCHLORIDE 2 MG/ML
INJECTION, SOLUTION EPIDURAL; INFILTRATION; PERINEURAL CONTINUOUS
Status: DISCONTINUED | OUTPATIENT
Start: 2021-01-17 | End: 2021-01-18 | Stop reason: HOSPADM

## 2021-01-17 RX ADMIN — OXYTOCIN 41.67 MILLI-UNITS/MIN: 10 INJECTION, SOLUTION INTRAMUSCULAR; INTRAVENOUS at 05:16

## 2021-01-17 ASSESSMENT — PAIN SCALES - GENERAL: PAIN_LEVEL: 2

## 2021-01-17 ASSESSMENT — PAIN DESCRIPTION - PAIN TYPE: TYPE: ACUTE PAIN

## 2021-01-17 NOTE — ANESTHESIA PREPROCEDURE EVALUATION
Relevant Problems   OB   (+) History of C/S x 1 - at 35wk for severe IUGR - wants TOLAC (consent signed 7/7/20), wants BTL if C/S needed (BTL consent signed 10/20/20)       Physical Exam    Airway   Mallampati: II  TM distance: >3 FB  Neck ROM: full       Cardiovascular - normal exam  Rhythm: regular  Rate: normal  (-) murmur     Dental - normal exam           Pulmonary - normal exam  Breath sounds clear to auscultation     Abdominal    Neurological - normal exam                 Anesthesia Plan    ASA 2       Plan - epidural   Neuraxial block will be labor analgesia              Pertinent diagnostic labs and testing reviewed    Informed Consent:    Anesthetic plan and risks discussed with patient.

## 2021-01-17 NOTE — PROGRESS NOTES
Patient arrived to room 352 at 0930. Report received from MORAIMA Elaine. Educated patient on cuddles system, emergency pull cord, and infant feeding frequency. Call light within reach.

## 2021-01-17 NOTE — CARE PLAN
Problem: Potential for postpartum infection related to presence of episiotomy/vaginal tear and/or uterine contamination  Goal: Patient will be absent from signs and symptoms of infection  Outcome: PROGRESSING AS EXPECTED     Problem: Altered physiologic condition related to immediate post-delivery state and potential for bleeding/hemorrhage  Goal: Patient physiologically stable as evidenced by normal lochia, palpable uterine involution and vital signs within normal limits  Outcome: PROGRESSING AS EXPECTED

## 2021-01-17 NOTE — ANESTHESIA PROCEDURE NOTES
Epidural Block    Date/Time: 1/16/2021 11:29 PM  Performed by: Jatinder Bullock M.D.  Authorized by: Jatinder Bullock M.D.     Patient Location:  OB  Start Time:  1/16/2021 11:29 PM  End Time:  1/16/2021 11:42 PM  Reason for Block: labor analgesia    patient identified, IV checked, site marked, risks and benefits discussed, surgical consent, monitors and equipment checked, pre-op evaluation and timeout performed    Patient Position:  Sitting  Prep: ChloraPrep, patient draped and sterile technique    Monitoring:  Blood pressure, continuous pulse oximetry and heart rate  Approach:  Midline  Location:  L3-L4  Injection Technique:  STEVEN saline  Skin infiltration:  Lidocaine  Strength:  1%  Dose:  5ml  Needle Type:  Tuohy  Needle Gauge:  17 G  Needle Length:  3.5 in  Loss of resistance::  6  Catheter Size:  19 G  Catheter at Skin Depth:  10  Test Dose Result:  Negative   25 g jacque to csf;   2 ml 0,15 ropiv

## 2021-01-17 NOTE — PROGRESS NOTES
"S: Pt is feeling more UCs.  Wants to wait until she gets an epidural to break her water.    O:    Vitals:    01/16/21 1018 01/16/21 1045 01/16/21 1719   BP: 129/82  117/78   Pulse: 79  80   Resp:  18    Temp: 36.4 °C (97.6 °F)  36.9 °C (98.5 °F)   TempSrc: Temporal  Temporal   SpO2:  97%    Weight: 73.9 kg (163 lb)     Height: 1.626 m (5' 4\")             FHTs:  Baseline 130, + accels, - decels, mod variability        [place]: 3 contractions in 10 minutes, firm to palpation, pitocin @ 5 milliunits        SVE: 3/80/-2 per last RN exam     A/P:    1.  IUP @ 39w6d - IOL for hx CS, desires TOLAC  2.  Cat 1 FHTs    3.  Continue pitocin IOL    4.  Will AROM and place internal monitors when comfortable with epidural    Alie Carranza, CNM, APN  Dr. Amador -- attending physician          "

## 2021-01-17 NOTE — PROGRESS NOTES
220-Rcvd report from dayshift RN and assumed care of pt.  0-Alie Carranza CNM at bedside to see pt. AROM of small amount of clear fluid. IUPC placed. SVE=4/80/-2  2240-IUPC not working. Alie at bedside to replace IUPC. Pitocin turned down to 2mu  5-Pitocin turned off.  2325-Dr. Bullock at bedside to place epidural. Epidural placed without incidence.  0015-clifton placed. SVE=6-7/100/-1  0345-SVE=complete  0348-started pushing.  0442- of viable baby girl with 8/9 apgars  0700-report given to dayshift RN

## 2021-01-17 NOTE — PROGRESS NOTES
" s/p vaginal delivery  0700 - Report received from TED Almeida RN. POC discussed, care assumed. Full Assessment complete. VSS. No complaints of pain. Fundus firm one fingerbreadth below umbilicus. light lochia at this time. POC discussed with pt and family members, all questions answered.   0815 - epidural catheter removed, tip intact. Pt sitting up at bedside, when attempting to stand, pt legs buckled-reports left leg is \"tingly\". Will wait to ambulate.  0845 - 2nd attempt to ambulate pt-legs still buckled  0915 - pt requesting to go to bathroom. 2nd RN at bedside on 3rd attempt to ambulate-pt ambulated to bathroom with semi-unsteady gait and assistance of 2. Unable to void. Kristine care provided with new gown and pads. Pt ambulated to wheelchair and transferred to post partum with infant in arms with personal belongings and FOB. Report given to Corinne VALERA. POC discussed. Infant band's ID'd x 2, cuddles active.      "

## 2021-01-17 NOTE — PROGRESS NOTES
"S: Pt would like her water broken.    O:    Vitals:    01/16/21 1018 01/16/21 1045 01/16/21 1719 01/16/21 2002   BP: 129/82  117/78 115/76   Pulse: 79  80 77   Resp:  18     Temp: 36.4 °C (97.6 °F)  36.9 °C (98.5 °F) 36.4 °C (97.6 °F)   TempSrc: Temporal  Temporal Temporal   SpO2:  97%     Weight: 73.9 kg (163 lb)      Height: 1.626 m (5' 4\")              FHTs:  Baseline 150, + accels, - decels, mod variability        [place]: 2 contractions in 10 minutes, mod to palpation, pitocin @ 5 milliunits        SVE: 4/80/-2        AROM for clear fluid.        IUPC placed.     A/P:    1.  IUP @ 39w6d - IOL for hx CS, desires TOLAC  2.  Cat 1 FHTs    3.  Continue pitocin per protocol.    4.  Pain management prn.    Alie Carranza, GAVINO, APN  Dr. Amador -- attending physician          "

## 2021-01-17 NOTE — ANESTHESIA POSTPROCEDURE EVALUATION
Patient: Debi Juarez    Procedure Summary     Date: 01/16/21 Room / Location:     Anesthesia Start: 2329 Anesthesia Stop: 01/17/21 0442    Procedure: Labor Epidural Diagnosis:     Scheduled Providers:  Responsible Provider: Jatinder Bullock M.D.    Anesthesia Type: epidural ASA Status: 2          Final Anesthesia Type: epidural  Last vitals  BP   Blood Pressure: 111/64    Temp   36.4 °C (97.6 °F)    Pulse   Pulse: 82   Resp   18    SpO2   96 %      Anesthesia Post Evaluation    Patient location during evaluation: PACU  Patient participation: complete - patient participated  Level of consciousness: awake and alert  Pain score: 2    Airway patency: patent  Anesthetic complications: no  Cardiovascular status: hemodynamically stable  Respiratory status: acceptable  Hydration status: euvolemic    PONV: none           Nurse Pain Score: 3 (NPRS)

## 2021-01-17 NOTE — PROCEDURES
Delivery Note    PATIENT ID:  NAME:  Debi Juarez  MRN:               [id number]  YOB: 1995    Labor Course  Patient was admitted to Labor and Delivery at 40w0d for induction of labor due to hx of CS and desires TOLAC.. Pt progressed with pitocin and AROM.      On 2021  at 04:42, this 25 y.o.,  40w0d now   , GBS neg female delivered via OA/ under epidural anesthesia a viable female infant weight pending with APGAR scores of 8 and 9 at one and five minutes.   Baby to maternal abdomen.  Spontaneous cry.  Mouth and nares bulb suctioned by RN. Delayed cord clamping occurred with cord doubly clamped by myself and cut by FOB after pulsations had stopped.  Pitocin infusing in IVF.  Spontaneous delivery of [place] placenta grossly intact @ 05:04.  CVx3. FF and bleeding small.  Upon vaginal exam, there was small 2nd degree perineal and 1st deg left labial llaceration which was repaired using 3.0 and 4-0 chromic and vicryl in the usual sterile fashion. Pt and infant are stable and bonding.  Lap count: correct x 2 with Ai Almeida RN.  Estimated blood loss: 300mL.      Alie Carranza, GAVINO, APN  Dr. Amador, attending physician

## 2021-01-17 NOTE — ANESTHESIA TIME REPORT
Anesthesia Start and Stop Event Times     Date Time Event    1/16/2021 2328 Ready for Procedure     2329 Anesthesia Start    1/17/2021 0442 Anesthesia Stop        Responsible Staff  01/16/21 to 01/17/21    Name Role Begin End    Jatinder Bullock M.D. Anesth 2329 0442        Preop Diagnosis (Free Text):  Pre-op Diagnosis             Preop Diagnosis (Codes):    Post op Diagnosis  Term pregnancy      Premium Reason  E. Weekend    Comments:

## 2021-01-18 ENCOUNTER — PHARMACY VISIT (OUTPATIENT)
Dept: PHARMACY | Facility: MEDICAL CENTER | Age: 26
End: 2021-01-18
Payer: COMMERCIAL

## 2021-01-18 VITALS
OXYGEN SATURATION: 98 % | RESPIRATION RATE: 18 BRPM | WEIGHT: 163 LBS | TEMPERATURE: 98 F | BODY MASS INDEX: 27.83 KG/M2 | DIASTOLIC BLOOD PRESSURE: 70 MMHG | HEIGHT: 64 IN | SYSTOLIC BLOOD PRESSURE: 115 MMHG | HEART RATE: 86 BPM

## 2021-01-18 PROBLEM — O09.90 SUPERVISION OF HIGH RISK PREGNANCY, ANTEPARTUM: Status: RESOLVED | Noted: 2020-07-07 | Resolved: 2021-01-18

## 2021-01-18 PROBLEM — O34.219 VBAC (VAGINAL BIRTH AFTER CESAREAN): Status: ACTIVE | Noted: 2021-01-18

## 2021-01-18 PROCEDURE — 700102 HCHG RX REV CODE 250 W/ 637 OVERRIDE(OP): Performed by: NURSE PRACTITIONER

## 2021-01-18 PROCEDURE — RXMED WILLOW AMBULATORY MEDICATION CHARGE: Performed by: NURSE PRACTITIONER

## 2021-01-18 PROCEDURE — A9270 NON-COVERED ITEM OR SERVICE: HCPCS | Performed by: NURSE PRACTITIONER

## 2021-01-18 RX ORDER — IBUPROFEN 800 MG/1
800 TABLET ORAL EVERY 8 HOURS PRN
Qty: 30 TAB | Refills: 0 | Status: SHIPPED | OUTPATIENT
Start: 2021-01-18 | End: 2021-02-18

## 2021-01-18 RX ADMIN — PRENATAL WITH FERROUS FUM AND FOLIC ACID 1 TABLET: 3080; 920; 120; 400; 22; 1.84; 3; 20; 10; 1; 12; 200; 27; 25; 2 TABLET ORAL at 08:27

## 2021-01-18 ASSESSMENT — EDINBURGH POSTNATAL DEPRESSION SCALE (EPDS)
I HAVE LOOKED FORWARD WITH ENJOYMENT TO THINGS: AS MUCH AS I EVER DID
I HAVE BEEN SO UNHAPPY THAT I HAVE HAD DIFFICULTY SLEEPING: NOT VERY OFTEN
THE THOUGHT OF HARMING MYSELF HAS OCCURRED TO ME: HARDLY EVER
I HAVE BEEN ANXIOUS OR WORRIED FOR NO GOOD REASON: YES, SOMETIMES
THINGS HAVE BEEN GETTING ON TOP OF ME: NO, MOST OF THE TIME I HAVE COPED QUITE WELL
I HAVE FELT SAD OR MISERABLE: NOT VERY OFTEN
I HAVE BEEN ABLE TO LAUGH AND SEE THE FUNNY SIDE OF THINGS: AS MUCH AS I ALWAYS COULD
I HAVE FELT SCARED OR PANICKY FOR NO GOOD REASON: YES, SOMETIMES
I HAVE BEEN SO UNHAPPY THAT I HAVE BEEN CRYING: NO, NEVER
I HAVE BLAMED MYSELF UNNECESSARILY WHEN THINGS WENT WRONG: YES, SOME OF THE TIME

## 2021-01-18 NOTE — DISCHARGE PLANNING
"Discharge Planning Assessment Post Partum    Reason for Referral: MOB scored a 10 on the EPDS screen and answered \"hardly\" to Question #10  Address:[address] [place], [place] [address], [place] [address]  Phone: [phone]  Type of Living Situation: living with FOB and son  Mom Diagnosis: Pregnancy  Baby Diagnosis: -39.6 weeks  Primary Language: Divehi and English    Name of Baby: Elsy Smith (: 21)  Father of the Baby: John Smith   Involved in baby’s care? Yes  Contact Information: [phone]    Prenatal Care: Yes  Mom's PCP: None  PCP for new baby: Pediatrician list provided    Support System: FOB  Coping/Bonding between mother & baby: Yes  Source of Feeding: breast feeding  Supplies for Infant: prepared for infant; denies any needs    Mom's Insurance: Medicaid  Baby Covered on Insurance:Yes  Mother Employed/School: Not currently  Other children in the home/names & ages: 2 year old son-Juarez Smith (: 3/8/18)    Financial Hardship/Income: denies   Mom's Mental status: alert and oriented  Services used prior to admit: Medicaid and WIC     CPS History: No  Psychiatric History: No.  Discussed EPDS screen and answering hardly ever to question #10.  MOB denies any thoughts of self-harm and states that she is feeling more anxious than depressed because it's been two years since her last baby and she is feeling anxiety about having a new baby.  Discussed post partum depression with mother and provided her with a list of counseling resources specializing in maternal mental health.  MOB states she has good support from FOB.  Domestic Violence History: No  Drug/ETOH History: No    Resources Provided: pediatrician list, children and family resource list, and post partum support and counseling resources provided  Referrals Made: diaper bank referral provided     Clearance for Discharge: Infant is cleared to discharge home with parents       "

## 2021-01-18 NOTE — PROGRESS NOTES
2100 Pt doing well bonding with baby, Assessment done denies pain at this time, Encourage to call for assistance, Needs attended.

## 2021-01-18 NOTE — DISCHARGE INSTRUCTIONS
POSTPARTUM DISCHARGE INSTRUCTIONS FOR MOM    YOB: 1995   Age: 25 y.o.               Admit Date: 1/16/2021     Discharge Date: 1/18/2021  Attending Doctor:  Callie Amador, *                  Allergies:  Patient has no known allergies.    Discharged to home by car. Discharged via wheelchair, hospital escort: Yes.  Special equipment needed: Not Applicable  Belongings with: Personal  Be sure to schedule a follow-up appointment with your primary care doctor or any specialists as instructed.     Discharge Plan:   Diet Plan: Discussed  Activity Level: Discussed  Confirmed Follow up Appointment: Patient to Call and Schedule Appointment  Influenza Vaccine Indication: Not indicated: Previously immunized this influenza season and > 8 years of age    REASONS TO CALL YOUR OBSTETRICIAN:  1.   Persistent fever or shaking chills (Temperature higher than 100.4)  2.   Heavy bleeding (soaking more than 1 pad per hour); Passing clots  3.   Foul odor from vagina  4.   Mastitis (Breast infection; breast pain, chills, fever, redness)  5.   Urinary pain, burning or frequency  6.   Episiotomy infection  7.   Abdominal incision infection  8.   Severe depression longer than 24 hours    HAND WASHING  · Prior to handling the baby.  · Before breastfeeding or bottle feeding baby.  · After using the bathroom or changing the baby's diaper.    VAGINAL CARE  · Nothing inside vagina for 6 weeks: no sexual intercourse, tampons or douching.  · Bleeding may continue for 2-4 weeks.  Amount may vary.    · Call your physician for heavy bleeding which means soaking more than 1 pad per hour    BIRTH CONTROL  · It is possible to become pregnant at any time after delivery and while breastfeeding.  · Plan to discuss a method of birth control with your physician at your follow up visit. visit.    DIET AND ELIMINATION  · Eating more fiber (bran cereal, fruits, and vegetables) and drinking plenty of fluids will help to avoid  "constipation.  · Urinary frequency after childbirth is normal.    POSTPARTUM BLUES  During the first few days after birth, you may experience a sense of the \"blues\" which may include impatience, irritability or even crying.  These feeling come and go quickly.  However, as many as 1 in 10 women experience emotional symptoms known as postpartum depression.    Postpartum depression:  May start as early as the second or third day after delivery or take several weeks or months to develop.  Symptoms of \"blues\" are present, but are more intense:  Crying spells; loss of appetite; feelings of hopelessness or loss of control; fear of touching the baby; over concern or no concern at all about the baby; little or no concern about your own appearance/caring for yourself; and/or inability to sleep or excessive sleeping.  Contact your physician if you are experiencing any of these symptoms.    Crisis Hotline:  · [place] Crisis Hotline:  1-700-RGNWDFF  Or 1-325.391.4133  · [place] Crisis Hotline:  1-167.445.4841  Or 947-389-1498    PREVENTING SHAKEN BABY:  If you are angry or stressed, PUT THE BABY IN THE CRIB, step away, take some deep breaths, and wait until you are calm to care for the baby.  DO NOT SHAKE THE BABY.  You are not alone, call a supporter for help.    · [facility] 24/7 crisis line 157-625-4472 or 1-175.447.2318  · You can also text them, text \"ANSWER\" to 035754      DEPRESSION / SUICIDE RISK:  As you are discharged from this [facility] facility, it is important to learn how to keep safe from harming yourself.    Recognize the warning signs:  · Abrupt changes in personality, positive or negative- including increase in energy   · Giving away possessions  · Change in eating patterns- significant weight changes-  positive or negative  · Change in sleeping patterns- unable to sleep or sleeping all the time   · Unwillingness or inability to communicate  · Depression  · Unusual sadness, discouragement and " loneliness  · Talk of wanting to die  · Neglect of personal appearance   · Rebelliousness- reckless behavior  · Withdrawal from people/activities they love  · Confusion- inability to concentrate     If you or a loved one observes any of these behaviors or has concerns about self-harm, here's what you can do:  · Talk about it- your feelings and reasons for harming yourself  · Remove any means that you might use to hurt yourself (examples: pills, rope, extension cords, firearm)  · Get professional help from the community (Mental Health, Substance Abuse, psychological counseling)  · Do not be alone:Call your Safe Contact- someone whom you trust who will be there for you.  · Call your local CRISIS HOTLINE 223-4559 or 942-724-2965  · Call your local Children's Mobile Crisis Response Team [place] (312) 452-6947 or www.Osmopure  · Call the toll free National Suicide Prevention Hotlines   · National Suicide Prevention Lifeline 488-855-DACY (8678)  · National Hope Line Network 800-SUICIDE (761-4443)    DISCHARGE SURVEY:  Thank you for choosing [facility].  We hope we provided you with very good care.  You may be receiving a survey in the mail.  Please fill it out.  Your opinion is valuable to us.    ADDITIONAL EDUCATIONAL MATERIALS GIVEN TO PATIENT:        My signature on this form indicates that:  1.  I have reviewed and understand the above information  2.  My questions regarding this information have been answered to my satisfaction.  3.  I have formulated a plan with my discharge nurse to obtain my prescribed medication for home.

## 2021-01-18 NOTE — PROGRESS NOTES
Discharge instruction for mom and baby discussed. Prescription meds is at the bedside. Emphasized the importance of  screening follow-up test. Questions and concerns have been answered.

## 2021-01-18 NOTE — PROGRESS NOTES
Assessment done vital signs stable. Patient progressing according to plan of care. Fundus firm at the umbilicus with light lochia. Patient up voiding without difficulty. Ambulating with steady gait. Breast feeding infant on demand. Family at bedside. POC discussed. Pt claims she will call for any needs or medications

## 2021-01-18 NOTE — LACTATION NOTE
This note was copied from a baby's chart.  Baby 40.1 weeks, , couplet to be discharged today. MOB reports baby has been latching well, nipples intact (no damage noted), weight loss 2.77%. Baby placed STS, attempted to latch baby, no latch, sleepy. Demo on keeping baby in safe STS position while awake to promote more breastfeeds.     MOB reports she did not breastfeed previous baby, she pumped x 1 month then bottle fed back.     Teaching on hunger cues, breastfeeding when baby shows cues, a minimum 8 breastfeeds in 24 hours no longer than 4 hours from last feed. Positioning baby at breast nipple to nose & cluster feeding as normal.     Referral filled out and [facility]axed to [facility]. Encouraged MOB to follow-up with [facility] for OP lactation if unable to sign up with [facility].    Breastfeeding plan:  Breastfeed on cue a minimum 8x/24 hours no longer than 4 hours from last feed.

## 2021-01-18 NOTE — DISCHARGE SUMMARY
Discharge Summary:      Debi Juarez    Admit Date:   2021  Discharge Date:  2021     Admitting diagnosis:  Labor and delivery, indication for care [O75.9]  Discharge Diagnosis: Status post vaginal, spontaneous.  Pregnancy Complications: none  Tubal Ligation:  N\A        History:  History reviewed. No pertinent past medical history.  OB History    Para Term  AB Living   3 1   1 1 1   SAB TAB Ectopic Molar Multiple Live Births   1         1      # Outcome Date GA Lbr Rosales/2nd Weight Sex Delivery Anes PTL Lv   3 Current            2 SAB 2019 7w0d          1  18 35w3d  1.377 kg (3 lb 0.6 oz) M CS-LTranv Spinal N GILBERT      Birth Comments: Primary C/S, Intrauterine pregnancy at 35-3/7 weeks with severe Dr Hill        Patient has no known allergies.  Patient Active Problem List    Diagnosis Date Noted   •  (vaginal birth after ) 2021   • Postpartum care and examination of lactating mother 2021   • Ovarian cyst affecting pregnancy in second trimester - 4.5p2y3gj, septated - will get MRI, f/u with MD 10/20/2020   • History of C/S x 1 - at 35wk for severe IUGR - wants TOLAC (consent signed 20), wants BTL if C/S needed (BTL consent signed 10/20/20) 2020   • History of PTD at 35wk for IUGR - see Op note 2020   • History of cervical cone biopsy 2018 2020        Hospital Course:   25 y.o. , now para 2, was admitted with the above mentioned diagnosis, underwent Induction of Labor, vaginal, spontaneous. Patient postpartum course was unremarkable, with progressive advancement in diet , ambulation and toleration of oral analgesia. Patient without complaints today and desires discharge.      Vitals:    21 1024 21 1400 21 1743 21 0549   BP: 126/83 110/69 125/76 115/70   Pulse:  81 93 86   Resp:     Temp:  36.8 °C (98.2 °F) 37.2 °C (99 °F) 36.7 °C (98 °F)   TempSrc:  Temporal Oral Temporal   SpO2:   99%  98%   Weight:       Height:           Current Facility-Administered Medications   Medication Dose   • oxytocin (PITOCIN) infusion (for postpartum)   mL/hr   • acetaminophen (Tylenol) tablet 325 mg  325 mg   • ibuprofen (MOTRIN) tablet 800 mg  800 mg   • ropivacaine 0.2 % (NAROPIN) injection     • LR infusion     • tetanus-dipth-acell pertussis (Tdap) inj 0.5 mL  0.5 mL   • measles, mumps and rubella vaccine (MMR) injection 0.5 mL  0.5 mL   • PRN oxytocin (PITOCIN) (20 Units/1000 mL) PRN for excessive uterine bleeding - See Admin Instr  125-999 mL/hr   • miSOPROStol (CYTOTEC) tablet 600 mcg  600 mcg   • methylergonovine (METHERGINE) injection 0.2 mg  0.2 mg   • prenatal plus vitamin (STUARTNATAL 1+1) 27-1 MG tablet 1 Tab  1 Tab   • D5LR infusion     • oxytocin (PITOCIN) infusion (for induction)  0.5-20 freedom-units/min       Exam:  Breast Exam: negative  Abdomen: Abdomen soft, non-tender. BS normal. No masses,  No organomegaly  Fundus Non Tender: yes  Incision: none  Perineum: perineal incision healing appropriately  Extremity: extremities, peripheral pulses and reflexes normal, no edema, redness or tenderness in the calves or thighs, Homans sign is negative, no sign of DVT, feet normal, good pulses, normal color, temperature and sensation     Labs:  Recent Labs     01/16/21  1035 01/17/21  1404   WBC 9.8 13.7*   RBC 4.25 3.42*   HEMOGLOBIN 12.9 10.4*   HEMATOCRIT 38.9 31.8*   MCV 91.5 93.0   MCH 30.4 30.4   MCHC 33.2* 32.7*   RDW 53.1* 54.1*   PLATELETCT 231 195   MPV 10.6 10.1        Activity:   Discharge to home  Pelvic Rest x 6 weeks    Assessment:  normal postpartum course  Discharge Assessment: Taking adequate diet and fluids, no heavy bleeding or foul discharge. Voiding without difficulty     Follow up: .C or [facility] in 5 weeks for vaginal.      Discharge Meds:   Current Outpatient Medications   Medication Sig Dispense Refill   • ibuprofen (MOTRIN) 800 MG Tab Take 1 Tab by mouth every 8  hours as needed (For cramping after delivery; do not give if patient is receiving ketorolac (Toradol)). 30 Tab 0     Pt need to RT TPC or ER if any of the following occur:  Fever over 100,5  Severe abd pain  Red streaks or painful masses in the breasts  Foul smelling d/c or lochia  Heavy vaginal bleeding saturating a pad per hour  S/s of PP depression  Desires PP BS for BCM  Needs to follow with MD for discussion of cyst removal    Dianelys Morin C.N.M.

## 2021-01-18 NOTE — DISCHARGE PLANNING
Meds-to-Beds: Discharge prescription orders listed below delivered to patient's bedside. RN notified. Patient counseled.       Debi Delgado   Home Medication Instructions JAKOB:[id number]    Printed on:01/18/21 1043   Medication Information                      ibuprofen (MOTRIN) 800 MG Tab  Take 1 Tablet by mouth every 8 hours as needed (For cramping after delivery).                 Tara Lyon, Pharmacy Intern

## 2021-02-18 ENCOUNTER — POST PARTUM (OUTPATIENT)
Dept: OBGYN | Facility: CLINIC | Age: 26
End: 2021-02-18
Payer: MEDICAID

## 2021-02-18 VITALS — WEIGHT: 140 LBS | SYSTOLIC BLOOD PRESSURE: 118 MMHG | BODY MASS INDEX: 24.03 KG/M2 | DIASTOLIC BLOOD PRESSURE: 70 MMHG

## 2021-02-18 DIAGNOSIS — N83.202 OVARIAN CYST, LEFT: ICD-10-CM

## 2021-02-18 PROBLEM — Z98.891 HISTORY OF C-SECTION: Status: RESOLVED | Noted: 2020-07-07 | Resolved: 2021-02-18

## 2021-02-18 PROBLEM — O09.891 HISTORY OF PRETERM DELIVERY, CURRENTLY PREGNANT IN FIRST TRIMESTER: Status: RESOLVED | Noted: 2020-07-07 | Resolved: 2021-02-18

## 2021-02-18 PROBLEM — O34.82 OVARIAN CYST AFFECTING PREGNANCY IN SECOND TRIMESTER, ANTEPARTUM: Status: RESOLVED | Noted: 2020-10-20 | Resolved: 2021-02-18

## 2021-02-18 PROBLEM — O34.41: Status: RESOLVED | Noted: 2020-07-07 | Resolved: 2021-02-18

## 2021-02-18 PROBLEM — N83.209 OVARIAN CYST AFFECTING PREGNANCY IN SECOND TRIMESTER, ANTEPARTUM: Status: RESOLVED | Noted: 2020-10-20 | Resolved: 2021-02-18

## 2021-02-18 PROCEDURE — 0503F POSTPARTUM CARE VISIT: CPT | Performed by: NURSE PRACTITIONER

## 2021-02-18 ASSESSMENT — EDINBURGH POSTNATAL DEPRESSION SCALE (EPDS)
I HAVE BEEN ABLE TO LAUGH AND SEE THE FUNNY SIDE OF THINGS: AS MUCH AS I ALWAYS COULD
I HAVE BEEN ANXIOUS OR WORRIED FOR NO GOOD REASON: NO, NOT AT ALL
I HAVE BEEN SO UNHAPPY THAT I HAVE BEEN CRYING: NO, NEVER
I HAVE BEEN SO UNHAPPY THAT I HAVE HAD DIFFICULTY SLEEPING: NOT AT ALL
THINGS HAVE BEEN GETTING ON TOP OF ME: NO, MOST OF THE TIME I HAVE COPED QUITE WELL
I HAVE FELT SCARED OR PANICKY FOR NO GOOD REASON: NO, NOT AT ALL
TOTAL SCORE: 2
I HAVE FELT SAD OR MISERABLE: NO, NOT AT ALL
I HAVE BLAMED MYSELF UNNECESSARILY WHEN THINGS WENT WRONG: NOT VERY OFTEN
I HAVE LOOKED FORWARD WITH ENJOYMENT TO THINGS: AS MUCH AS I EVER DID
THE THOUGHT OF HARMING MYSELF HAS OCCURRED TO ME: NEVER

## 2021-02-18 ASSESSMENT — FIBROSIS 4 INDEX: FIB4 SCORE: 0.39

## 2021-02-18 NOTE — PROGRESS NOTES
Pt here today for postpartum exam.  Delivery type:vaginal delivery 1/17/21  Currently: breast feeding  Desired BCM: desires bilateral salpingectomy. Meanwhile will use condoms  LMP: n/a  Last pap: 6/9/20= negative  Phone # [phone]

## 2021-02-18 NOTE — PROGRESS NOTES
Subjective:    Debi Juarez is a 25 y.o.  female who presents for her postpartum exam. She had  without complication on 21. Her prenatal course was complicated by a left ovarian cyst. She denies dysuria, odor, itching or breast problems. She is breastfeeding well. She desires a BS for her birth control method. Reports no sex prior to this appointment.  Denies any S/S of PP depression.     Postpartum care            HPI  Review of Systems   All other systems reviewed and are negative.         Objective:     /70   Wt 63.5 kg (140 lb)   Breastfeeding Unknown   BMI 24.03 kg/m²    EPDS: 2    Physical Exam  Vitals and nursing note reviewed. Exam conducted with a chaperone present.   Constitutional:       Appearance: Normal appearance. She is well-developed.   HENT:      Head: Normocephalic and atraumatic.   Neck:      Thyroid: No thyromegaly.   Cardiovascular:      Rate and Rhythm: Normal rate and regular rhythm.      Pulses: Normal pulses.      Heart sounds: Normal heart sounds.   Pulmonary:      Effort: Pulmonary effort is normal.      Breath sounds: Normal breath sounds.   Chest:      Breasts: Breasts are symmetrical.         Right: Normal. No inverted nipple, mass, nipple discharge, skin change or tenderness.         Left: Normal. No inverted nipple, mass, nipple discharge, skin change or tenderness.   Abdominal:      General: Bowel sounds are normal.      Palpations: Abdomen is soft.   Genitourinary:     General: Normal vulva.      Exam position: Supine.      Labia:         Right: No rash, tenderness, lesion or injury.         Left: No rash, tenderness, lesion or injury.       Vagina: Normal.      Cervix: Normal.      Uterus: Normal.       Adnexa: Right adnexa normal and left adnexa normal.        Right: No mass, tenderness or fullness.          Left: No mass, tenderness or fullness.        Comments: Menses  Repair well healed  Musculoskeletal:         General: Normal range of  motion.      Cervical back: Normal range of motion and neck supple.   Skin:     General: Skin is warm and dry.      Capillary Refill: Capillary refill takes less than 2 seconds.   Neurological:      Mental Status: She is alert and oriented to person, place, and time.      Deep Tendon Reflexes: Reflexes are normal and symmetric.   Psychiatric:         Mood and Affect: Mood normal.         Behavior: Behavior normal.         Thought Content: Thought content normal.         Judgment: Judgment normal.               Assessment   Assessment:    1. PP care of lactating women   2. Exam WNL   3. Pap WNL   4. Desires sterilization    Patient Active Problem List    Diagnosis Date Noted   •  (vaginal birth after ) 2021   • Postpartum care and examination of lactating mother 2021   • Ovarian cyst affecting pregnancy in second trimester - 4.9l4m2id, septated - will get MRI, f/u with MD 10/20/2020       Plan   Plan:    1. Breastfeeding support. Encourage SBE.  2. Continue PNV.  3. Contraceptive counseling - follow up here for BS. Referral placed  4. Encouraged condom use.  5. Discussed diet, exercise and resumption of normal activities.  6. Gave copy of pap, f/u 3 yr.  7.  F/u c PCP or [facility] clinic as needed for primary care needs.   8.  Repeat US ordered to f/u ovarian cyst.    Chaperone offered and provided by Kelly Jewell MA.

## 2021-02-18 NOTE — PATIENT INSTRUCTIONS
Plan   Plan:    1. Breastfeeding support. Encourage SBE.  2. Continue PNV.  3. Contraceptive counseling - follow up here for BS. Referral placed  4. Encouraged condom use.  5. Discussed diet, exercise and resumption of normal activities.  6. Gave copy of pap, f/u 3 yr.  7.  F/u c PCP or [facility] clinic as needed for primary care needs.   8.  Repeat US ordered to f/u ovarian cyst.

## 2021-03-05 ENCOUNTER — GYNECOLOGY VISIT (OUTPATIENT)
Dept: OBGYN | Facility: CLINIC | Age: 26
End: 2021-03-05
Payer: MEDICAID

## 2021-03-05 VITALS
DIASTOLIC BLOOD PRESSURE: 68 MMHG | SYSTOLIC BLOOD PRESSURE: 110 MMHG | WEIGHT: 134 LBS | HEIGHT: 64 IN | BODY MASS INDEX: 22.88 KG/M2

## 2021-03-05 DIAGNOSIS — N83.202 CYST OF LEFT OVARY: ICD-10-CM

## 2021-03-05 DIAGNOSIS — Z30.09 STERILIZATION CONSULT: ICD-10-CM

## 2021-03-05 PROCEDURE — 99214 OFFICE O/P EST MOD 30 MIN: CPT | Performed by: OBSTETRICS & GYNECOLOGY

## 2021-03-05 ASSESSMENT — FIBROSIS 4 INDEX: FIB4 SCORE: 0.39

## 2021-03-05 NOTE — PROGRESS NOTES
Subjective:      Debi Juarez is a 25 y.o. female who presents with No chief complaint on file.        CC: desires permanent sterilization, hx of ovarian cyst    HPI: 25-year-old  3 para 1-1-1-2, status post recent vaginal delivery in 2021, presents to discuss permanent sterilization.  She has completed her childbearing, and desires bilateral salpingectomy.  She also has history of a left ovarian cyst during pregnancy.  A previous ultrasound on 10/20/2020 revealed a complex left adnexal mass measuring 4.5 x 3.5 x 2.5 cm with a thickened septum.  The patient has no history of pelvic pain, pressure, bloating.  She is currently breast-feeding.  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  Her most recent Pap smear was normal.    History reviewed. No pertinent past medical history.    Past Surgical History:   Procedure Laterality Date   • CERVICAL CONIZATION N/A 2018    Procedure: CERVICAL CONIZATION- COLD KNIFE;  Surgeon: Rustam Hill M.D.;  Location: SURGERY SAME DAY [facility];  Service: Gynecology   • PRIMARY C SECTION N/A 3/8/2018    Procedure: PRIMARY C SECTION;  Surgeon: Rustam Hill M.D.;  Location: LABOR AND DELIVERY;  Service: Labor and Delivery         Current Outpatient Medications:   •  Prenatal MV-Min-Fe Fum-FA-DHA (PRENATAL 1 PO), Take  by mouth., Disp: , Rfl:     Patient has no known allergies.    Family History   Problem Relation Age of Onset   • No Known Problems Mother    • No Known Problems Father    • No Known Problems Sister    • No Known Problems Brother    • Cancer Maternal Grandmother    • No Known Problems Maternal Grandfather    • Stroke Paternal Grandmother    • No Known Problems Paternal Grandfather        Social History     Socioeconomic History   • Marital status: Single     Spouse name: Not on file   • Number of children: Not on file   • Years of education: Not on file   • Highest education level: Not on file  "  Occupational History   • Not on file   Tobacco Use   • Smoking status: Never Smoker   • Smokeless tobacco: Never Used   Substance and Sexual Activity   • Alcohol use: No   • Drug use: No   • Sexual activity: Not Currently     Partners: Male     Comment: None    Other Topics Concern   • Not on file   Social History Narrative   • Not on file     Social Determinants of Health     Financial Resource Strain:    • Difficulty of Paying Living Expenses:    Food Insecurity:    • Worried About Running Out of Food in the Last Year:    • Ran Out of Food in the Last Year:    Transportation Needs:    • Lack of Transportation (Medical):    • Lack of Transportation (Non-Medical):    Physical Activity:    • Days of Exercise per Week:    • Minutes of Exercise per Session:    Stress:    • Feeling of Stress :    Social Connections:    • Frequency of Communication with Friends and Family:    • Frequency of Social Gatherings with Friends and Family:    • Attends Worship Services:    • Active Member of Clubs or Organizations:    • Attends Club or Organization Meetings:    • Marital Status:    Intimate Partner Violence:    • Fear of Current or Ex-Partner:    • Emotionally Abused:    • Physically Abused:    • Sexually Abused:        OB History    Para Term  AB Living   3 2 1 1 1 2   SAB TAB Ectopic Molar Multiple Live Births   1 0 0 0 0 2       ROS REVIEW OF SYSTEMS:    Pertinent positives and negatives mentioned in HPI.    All other systems reviewed and are negative or noncontributory.       Objective:     /68 (BP Location: Left arm, Patient Position: Sitting)   Ht 1.626 m (5' 4\")   Wt 60.8 kg (134 lb)   Breastfeeding Yes   BMI 23.00 kg/m²      Physical Exam      GENERAL: Alert, in no apparent distress  PSYCHIATRIC: Appropriate affect, intact insight and judgement.  ABDOMEN: Soft, nontender, nondistended.  No palpable masses.  No rebound or guarding.  No inguinal lymphadenopathy.  No hepatosplenomegaly.  No " hernias.  EXTREMITIES: No edema  SKIN: No rash       Assessment/Plan:        1. Sterilization consult  The risks, benefits, alternatives, and indications of permanent sterilization (Bilateral Tubal Ligation or Bilateral Salpingectomies) were discussed with the patient today. Specifically,  I discussed that bilateral tubal ligation or bilateral salpingectomies are considered a permanent procedure and the patient will not be able to get pregnant after the tubal ligation is performed. I  also discussed the risk of sterilization failure, which is one in 200 surgeries, if fistula formation. The patient is informed that if tubal ligation fails, she has a risk of intrauterine pregnancy or increased risk of ectopic pregnancy due to tubal ligation. Alternative methods for birth control were discussed including abstinence, barrier methods condoms and diaphragm, and hormonal contraception including birth control pills, Depo-Provera, Nexplanon, intrauterine device and male sterilization. We also discussed the risk of tubal regret syndrome which is more common in those of young age and low parity. The patient voiced understanding of all these risks.  We also discussed the potential benefits of removing both tubes with possible reduction in future risk of ovarian cancer, but it may increase the complexity of the procedure and the length of the procedure.    The risks of the surgical procedure include but are not limited to bleeding, infection, blood transfusion, damage to surrounding organs including bowel, bladder, ureters, nerves, vessels: need for repair or future surgery, inability to complete the procedure due to scar tissue or adhesions, possible laparotomy,unexpected complications, unexpected pathology, anesthesia risks, and rarely death.  Questions answered.      2. Cyst of left ovary  A repeat ultrasound has been ordered.  If the mass is persistent, will consider laparoscopic cystectomy at the time of bilateral  salpingectomy.    F/U for preop

## 2021-03-05 NOTE — PROGRESS NOTES
GYN visit for BTL Consult  LMP: none yet  Currently breast feeding only  WT: 134 lb  BP: 110/68  BTL Consent signed today  Good # [phone]

## 2021-03-08 DIAGNOSIS — N83.202 LEFT OVARIAN CYST: ICD-10-CM

## 2021-03-16 ENCOUNTER — APPOINTMENT (OUTPATIENT)
Dept: RADIOLOGY | Facility: MEDICAL CENTER | Age: 26
End: 2021-03-16
Attending: NURSE PRACTITIONER
Payer: MEDICAID

## 2021-03-16 DIAGNOSIS — N83.202 LEFT OVARIAN CYST: ICD-10-CM

## 2021-03-16 PROCEDURE — 76830 TRANSVAGINAL US NON-OB: CPT

## 2021-03-17 ENCOUNTER — TELEPHONE (OUTPATIENT)
Dept: OBGYN | Facility: CLINIC | Age: 26
End: 2021-03-17

## 2021-03-17 NOTE — TELEPHONE ENCOUNTER
Tried to call pt about scheduling an appt with MD but pt not available    ----- Message from JIMMY Salgado sent at 3/17/2021  1:00 PM PDT -----  Please make sure pt has appt to talk to MD about her cysts, I believe she was planning a BTL and this US relates to that.

## 2021-03-19 ENCOUNTER — TELEPHONE (OUTPATIENT)
Dept: OBGYN | Facility: CLINIC | Age: 26
End: 2021-03-19

## 2021-03-22 ENCOUNTER — TELEPHONE (OUTPATIENT)
Dept: OBGYN | Facility: CLINIC | Age: 26
End: 2021-03-22

## 2021-03-22 NOTE — TELEPHONE ENCOUNTER
Pt called because she wanted to know when her surgery is, advised pt that she will be notified when her surgery is scheduled. Pt understood and no further questions.

## 2021-04-01 ENCOUNTER — OFFICE VISIT (OUTPATIENT)
Dept: MEDICAL GROUP | Facility: MEDICAL CENTER | Age: 26
End: 2021-04-01
Attending: PHYSICIAN ASSISTANT
Payer: MEDICAID

## 2021-04-01 VITALS
BODY MASS INDEX: 21.71 KG/M2 | RESPIRATION RATE: 16 BRPM | HEART RATE: 103 BPM | HEIGHT: 63 IN | TEMPERATURE: 98 F | WEIGHT: 122.5 LBS | DIASTOLIC BLOOD PRESSURE: 60 MMHG | OXYGEN SATURATION: 99 % | SYSTOLIC BLOOD PRESSURE: 106 MMHG

## 2021-04-01 DIAGNOSIS — F41.9 ANXIETY AND DEPRESSION: ICD-10-CM

## 2021-04-01 DIAGNOSIS — F32.A ANXIETY AND DEPRESSION: ICD-10-CM

## 2021-04-01 PROCEDURE — 99203 OFFICE O/P NEW LOW 30 MIN: CPT | Performed by: PHYSICIAN ASSISTANT

## 2021-04-01 PROCEDURE — 99204 OFFICE O/P NEW MOD 45 MIN: CPT | Performed by: PHYSICIAN ASSISTANT

## 2021-04-01 PROCEDURE — 99213 OFFICE O/P EST LOW 20 MIN: CPT | Performed by: PHYSICIAN ASSISTANT

## 2021-04-01 ASSESSMENT — PATIENT HEALTH QUESTIONNAIRE - PHQ9
CLINICAL INTERPRETATION OF PHQ2 SCORE: 2
5. POOR APPETITE OR OVEREATING: 1 - SEVERAL DAYS
SUM OF ALL RESPONSES TO PHQ QUESTIONS 1-9: 9

## 2021-04-01 ASSESSMENT — FIBROSIS 4 INDEX: FIB4 SCORE: 0.39

## 2021-04-01 NOTE — ASSESSMENT & PLAN NOTE
Depression    26 yo female who presents today for evaluation of depressed mood and/or loss of interests/pleasure most days, usually >2 days/week. There is no imminent risk of serious self-harm/suicide. PHQ-9 obtained in clinic today revealed a score of 9 indicating mild depression. No medications, comorbid medical conditions or substance abuse causing depression. Denies hx of treatment resistant depression, current ciara, hypomania or psychosis.    FMHx of bipolar disorder depression and schizophrenia in Mother.     Anxiety  26 yo male who presents today for evaluation of excessive anxiety and worry that has occurred more often than not. Patient finds it difficult to control the worry and is easily fatigued, difficulty concentrating or mind going blank, restlessness or feeling keyed up or on edge. This has affected her social areas of functioning. SRINIVAS-7 performed in clinic today with a score of 7 indicating mild anxiety. No imminent risk of serious self-harm/suicide. No current substance abuse. No Hx of known thyroid disorder and no hx of psychiatric conditions.

## 2021-04-01 NOTE — PROGRESS NOTES
Chief Complaint   Patient presents with   • Depression   • Anxiety   • Establish Care       Subjective:     HPI:   Debi Juarez is a 25 y.o. female here to establish care and to discuss the evaluation and management of:    Anxiety and depression  Depression    24 yo female who presents today for evaluation of depressed mood and/or loss of interests/pleasure most days, usually >2 days/week. There is no imminent risk of serious self-harm/suicide. PHQ-9 obtained in clinic today revealed a score of 9 indicating mild depression. No medications, comorbid medical conditions or substance abuse causing depression. Denies hx of treatment resistant depression, current ciara, hypomania or psychosis.    FMHx of bipolar disorder depression and schizophrenia in Mother.     Anxiety  24 yo male who presents today for evaluation of excessive anxiety and worry that has occurred more often than not. Patient finds it difficult to control the worry and is easily fatigued, difficulty concentrating or mind going blank, restlessness or feeling keyed up or on edge. This has affected her social areas of functioning. SRINIVAS-7 performed in clinic today with a score of 7 indicating mild anxiety. No imminent risk of serious self-harm/suicide. No current substance abuse. No Hx of known thyroid disorder and no hx of psychiatric conditions.    ROS  See HPI.     No Known Allergies    Current medicines (including changes today)  Current Outpatient Medications   Medication Sig Dispense Refill   • Prenatal MV-Min-Fe Fum-FA-DHA (PRENATAL 1 PO) Take  by mouth.       No current facility-administered medications for this visit.     She  has no past medical history of Blood clotting disorder (HCC) or Hemorrhagic disorder (HCC).  She  has a past surgical history that includes cervical conization (N/A, 6/29/2018) and primary c section (N/A, 3/8/2018).  Social History     Tobacco Use   • Smoking status: Never Smoker   • Smokeless tobacco: Never Used  "  Substance Use Topics   • Alcohol use: No   • Drug use: No       Family History   Problem Relation Age of Onset   • No Known Problems Mother    • No Known Problems Father    • No Known Problems Sister    • No Known Problems Brother    • Cancer Maternal Grandmother    • No Known Problems Maternal Grandfather    • Stroke Paternal Grandmother    • No Known Problems Paternal Grandfather      Family Status   Relation Name Status   • Mo  Alive   • Fa  Alive   • Sis 2 Alive   • Bro 1 Alive   • MGMo     • MGFa     • PGMo     • PGFa         Patient Active Problem List    Diagnosis Date Noted   • Anxiety and depression 2021   • Ovarian cyst, left 2021   •  (vaginal birth after ) 2021   • Postpartum care and examination of lactating mother 2021        Objective:     /60 (BP Location: Right arm, Patient Position: Sitting, BP Cuff Size: Adult)   Pulse (!) 103   Temp 36.7 °C (98 °F) (Temporal)   Resp 16   Ht 1.6 m (5' 3\")   Wt 55.6 kg (122 lb 8 oz)   SpO2 99%  Body mass index is 21.7 kg/m².    Physical Exam:  Physical Exam   Constitutional: She is oriented to person, place, and time and well-developed, well-nourished, and in no distress.   HENT:   Head: Normocephalic.   Right Ear: External ear normal.   Left Ear: External ear normal.   Eyes: Pupils are equal, round, and reactive to light.   Neck: No thyromegaly present.   Cardiovascular: Normal rate, regular rhythm and normal heart sounds.   Pulmonary/Chest: Effort normal and breath sounds normal.   Neurological: She is alert and oriented to person, place, and time. Gait normal.   Skin: Skin is warm and dry.   Psychiatric: Affect and judgment normal.   Tearful on exam.    Vitals reviewed.    Assessment and Plan:     The following treatment plan was discussed:    1. Anxiety and depression  - This is a chronic condition.   - No imminent risk of suicidal or homicidal ideation.   - PHQ-9 performed in " clinic today revealed a score of 9 indicating mild depression and a SRINIVAS-7 score of 7 indicating mild anxiety.  - The patient feels that dual therapy would be of great benefit for her at this time.  - Plan: Phone number given to the patient to call and schedule an appointment to establish care with a psychologist for CBT and a psychiatrist for medication management. She has been instructed to call and schedule these appointments today. She would like to forego thyroid d/o r/o at this time. ED and return precautions discussed.     Any change or worsening of signs or symptoms, patient encouraged to follow-up or report to emergency room for further evaluation. Patient verbalizes understanding and agrees.    Follow-Up: Return if symptoms worsen or fail to improve.      PLEASE NOTE: This dictation was created using voice recognition software. I have made every reasonable attempt to correct obvious errors, but I expect that there are errors of grammar and possibly content that I did not discover before finalizing the note.

## 2021-04-27 ENCOUNTER — GYNECOLOGY VISIT (OUTPATIENT)
Dept: OBGYN | Facility: CLINIC | Age: 26
End: 2021-04-27
Payer: MEDICAID

## 2021-04-27 VITALS — DIASTOLIC BLOOD PRESSURE: 68 MMHG | WEIGHT: 122.8 LBS | SYSTOLIC BLOOD PRESSURE: 102 MMHG | BODY MASS INDEX: 21.75 KG/M2

## 2021-04-27 DIAGNOSIS — N83.202 CYST OF LEFT OVARY: ICD-10-CM

## 2021-04-27 DIAGNOSIS — Z30.2 REQUEST FOR STERILIZATION: ICD-10-CM

## 2021-04-27 PROCEDURE — 99999 PR NO CHARGE: CPT | Performed by: OBSTETRICS & GYNECOLOGY

## 2021-04-27 ASSESSMENT — FIBROSIS 4 INDEX: FIB4 SCORE: 0.39

## 2021-04-27 NOTE — PROGRESS NOTES
History and Physical    CC: Desires permanent sterilization, left adnexal mass    HPI:  Debi Juarez is a 25 y.o. year old  unknown, not using contraception, breast feeding who presents for preoperative exam for permanent sterilization and evaluation of persistent left adnexal mass.  The mass was incidentally discovered on ultrasound while pregnant.  She denies pelvic pain.  She admits to some loss of appetite, but attributes this due to depression.  She denies increasing abdominal girth, early satiety.  Family history is negative for ovarian cancer.    Denies fevers, chills, URI symptoms, nausea, vomiting, abdominal pain, constipation, diarrhea, shortness of breath, chest pain, light headedness, dysuria, urgency, frequency, or vaginal discharge.    ROS: A comphrensive review of symptoms was performed.  Pertinent positive and negatives are noted in HPI.     Past medical history  - depression and anxiety    Past Surgical History:   Procedure Laterality Date   • CERVICAL CONIZATION N/A 2018    Procedure: CERVICAL CONIZATION- COLD KNIFE;  Surgeon: Rustam Hill M.D.;  Location: SURGERY SAME DAY [facility];  Service: Gynecology   • PRIMARY C SECTION N/A 3/8/2018    Procedure: PRIMARY C SECTION;  Surgeon: Rustam Hill M.D.;  Location: LABOR AND DELIVERY;  Service: Labor and Delivery     OB History    Para Term  AB Living   3 2 1 1 1 2   SAB TAB Ectopic Molar Multiple Live Births   1         2      # Outcome Date GA Lbr Rosales/2nd Weight Sex Delivery Anes PTL Lv   3 Term 21 40w0d  3.629 kg (8 lb) F  EPI N GILBERT      Birth Comments: Pt states no complications   2 SAB  7w0d          1  18 35w3d  1.377 kg (3 lb 0.6 oz) M CS-LTranv Spinal N GILBERT      Birth Comments: Primary C/S, Intrauterine pregnancy at 35-3/7 weeks with severe Dr Hill     Social History     Socioeconomic History   • Marital status: Single     Spouse name: Not on file   • Number of children:  Not on file   • Years of education: Not on file   • Highest education level: Not on file   Occupational History   • Not on file   Tobacco Use   • Smoking status: Never Smoker   • Smokeless tobacco: Never Used   Substance and Sexual Activity   • Alcohol use: No   • Drug use: No   • Sexual activity: Not Currently     Partners: Male     Comment: None    Other Topics Concern   • Not on file   Social History Narrative   • Not on file     Social Determinants of Health     Financial Resource Strain:    • Difficulty of Paying Living Expenses:    Food Insecurity:    • Worried About Running Out of Food in the Last Year:    • Ran Out of Food in the Last Year:    Transportation Needs:    • Lack of Transportation (Medical):    • Lack of Transportation (Non-Medical):    Physical Activity:    • Days of Exercise per Week:    • Minutes of Exercise per Session:    Stress:    • Feeling of Stress :    Social Connections:    • Frequency of Communication with Friends and Family:    • Frequency of Social Gatherings with Friends and Family:    • Attends Zoroastrianism Services:    • Active Member of Clubs or Organizations:    • Attends Club or Organization Meetings:    • Marital Status:    Intimate Partner Violence:    • Fear of Current or Ex-Partner:    • Emotionally Abused:    • Physically Abused:    • Sexually Abused:      Family History   Problem Relation Age of Onset   • No Known Problems Mother    • No Known Problems Father    • No Known Problems Sister    • No Known Problems Brother    • Cancer Maternal Grandmother    • No Known Problems Maternal Grandfather    • Stroke Paternal Grandmother    • No Known Problems Paternal Grandfather      Allergies: Patient has no known allergies.    Current Outpatient Medications:   •  Prenatal MV-Min-Fe Fum-FA-DHA (PRENATAL 1 PO), Take  by mouth., Disp: , Rfl:   Zoloft 50 mg daily    Patient Active Problem List    Diagnosis Date Noted   • Anxiety and depression 04/01/2021   • Ovarian cyst, left  2021   •  (vaginal birth after ) 2021   • Postpartum care and examination of lactating mother 2021       Objective:   /68 (BP Location: Left arm, Patient Position: Sitting, BP Cuff Size: Adult)   Wt 55.7 kg (122 lb 12.8 oz)     GENERAL: Alert, in no apparent distress  PSYCHIATRIC: Appropriate affect, intact insight and judgement.  NECK:  Nontender, no masses.  No Thyromegaly or nodules. No lymphadenopathy.  RESPIRATORY: Normal respiratory effort.  Lungs clear to auscultation.   CARDIOVASCULAR: RRR, no murmur, gallop, or rub.  ABDOMEN: Soft, nontender, nondistended.  No palpable masses.  No rebound or guarding.  No inguinal lymphadenopathy.  No hepatosplenomegaly.  No hernias.  BACK: No CVA tenderness  EXTREMITIES: No edema  SKIN: No rash      Lab Review  No results found for this or any previous visit (from the past 72 hour(s)).  Pap Smear 2020 normal, negative GC  Hemoglobin 21 - 10.4    Pelvic ultrasound from 3/16/2021  Uterus measures 4.2 x 6.2 x 6.3 cm.  Endometrial echo measures 0.40 cm.  The right ovary measures 2.4 x 1.7 x 2.0 cm.  The left ovary measures 4.1 x 2.9 x 3.67 cm.  There is a 4.2 cm cystic mass in the left adnexa with more internal debris compared to prior.  The cystic component also appears somewhat tubular.     Assessment:   25 y.o. year old  days permanent sterilization. The risks, benefits, alternatives, and indications of permanent sterilization (Bilateral Tubal Ligation or Bilateral Salpingectomies) were discussed with the patient today. Specifically,  I discussed that bilateral tubal ligation or bilateral salpingectomies are considered a permanent procedure and the patient will not be able to get pregnant after the tubal ligation is performed. I  also discussed the risk of sterilization failure, which is one in 200 surgeries, if fistula formation. The patient is informed that if tubal ligation fails, she has a risk of intrauterine  pregnancy or increased risk of ectopic pregnancy due to tubal ligation. Alternative methods for birth control were discussed including abstinence, barrier methods condoms and diaphragm, and hormonal contraception including birth control pills, Depo-Provera, Nexplanon, intrauterine device and male sterilization. We also discussed the risk of tubal regret syndrome which is more common in those of young age and low parity. The patient voiced understanding of all these risks.  We also discussed the potential benefits of removing both tubes with possible reduction in future risk of ovarian cancer, but it may increase the complexity of the procedure and the length of the procedure.    The risks of the surgical procedure include but are not limited to bleeding, infection, blood transfusion, damage to surrounding organs including bowel, bladder, ureters, nerves, vessels: need for repair or future surgery, inability to complete the procedure due to scar tissue or adhesions, possible laparotomy,unexpected complications, unexpected pathology, anesthesia risks, and rarely death.  Questions answered.      She also has a persistent left adnexal mass, currently measuring 4.2 cm.  Differential diagnosis includes hemorrhagic cyst versus hydrosalpinx or pyosalpinx.  I have a low suspicion for malignancy.  A CA-125 level was ordered.  We discussed that if the mass is just the tube, we will plan to remove the tube.  If there is a cyst that can easily be removed, we will performed left cystectomy versus left salpingo-oophorectomy.  She is aware that if this happens to be malignant, she may require future surgery in the form of hysterectomy, removal of the remaining ovary, and chemotherapy.        Plan:   Laparoscopic bilateral salpingectomy, left ovarian cystectomy versus left oophorectomy, indicated procedures scheduled on May 12, 2021.  P.o. after midnight day of surgery  Discussed postoperative medications and healing time.

## 2021-05-08 ENCOUNTER — PRE-ADMISSION TESTING (OUTPATIENT)
Dept: ADMISSIONS | Facility: MEDICAL CENTER | Age: 26
End: 2021-05-08
Attending: OBSTETRICS & GYNECOLOGY
Payer: MEDICAID

## 2021-05-08 DIAGNOSIS — Z01.812 PRE-OPERATIVE LABORATORY EXAMINATION: ICD-10-CM

## 2021-05-08 LAB — COVID ORDER STATUS COVID19: NORMAL

## 2021-05-08 PROCEDURE — U0003 INFECTIOUS AGENT DETECTION BY NUCLEIC ACID (DNA OR RNA); SEVERE ACUTE RESPIRATORY SYNDROME CORONAVIRUS 2 (SARS-COV-2) (CORONAVIRUS DISEASE [COVID-19]), AMPLIFIED PROBE TECHNIQUE, MAKING USE OF HIGH THROUGHPUT TECHNOLOGIES AS DESCRIBED BY CMS-2020-01-R: HCPCS

## 2021-05-08 PROCEDURE — C9803 HOPD COVID-19 SPEC COLLECT: HCPCS

## 2021-05-08 PROCEDURE — U0005 INFEC AGEN DETEC AMPLI PROBE: HCPCS

## 2021-05-09 LAB
SARS-COV-2 RNA RESP QL NAA+PROBE: NOTDETECTED
SPECIMEN SOURCE: NORMAL

## 2021-05-10 ENCOUNTER — PRE-ADMISSION TESTING (OUTPATIENT)
Dept: ADMISSIONS | Facility: MEDICAL CENTER | Age: 26
End: 2021-05-10
Attending: OBSTETRICS & GYNECOLOGY
Payer: MEDICAID

## 2021-05-12 ENCOUNTER — HOSPITAL ENCOUNTER (OUTPATIENT)
Facility: MEDICAL CENTER | Age: 26
End: 2021-05-12
Attending: OBSTETRICS & GYNECOLOGY | Admitting: OBSTETRICS & GYNECOLOGY
Payer: MEDICAID

## 2021-05-12 ENCOUNTER — ANESTHESIA EVENT (OUTPATIENT)
Dept: SURGERY | Facility: MEDICAL CENTER | Age: 26
End: 2021-05-12
Payer: MEDICAID

## 2021-05-12 ENCOUNTER — ANESTHESIA (OUTPATIENT)
Dept: SURGERY | Facility: MEDICAL CENTER | Age: 26
End: 2021-05-12
Payer: MEDICAID

## 2021-05-12 VITALS
TEMPERATURE: 97.5 F | WEIGHT: 121.25 LBS | BODY MASS INDEX: 21.48 KG/M2 | HEART RATE: 81 BPM | DIASTOLIC BLOOD PRESSURE: 85 MMHG | RESPIRATION RATE: 12 BRPM | OXYGEN SATURATION: 94 % | SYSTOLIC BLOOD PRESSURE: 121 MMHG | HEIGHT: 63 IN

## 2021-05-12 DIAGNOSIS — Z98.890 POST-OPERATIVE STATE: ICD-10-CM

## 2021-05-12 LAB
BASOPHILS # BLD AUTO: 0.3 % (ref 0–1.8)
BASOPHILS # BLD: 0.02 K/UL (ref 0–0.12)
EOSINOPHIL # BLD AUTO: 0.04 K/UL (ref 0–0.51)
EOSINOPHIL NFR BLD: 0.7 % (ref 0–6.9)
ERYTHROCYTE [DISTWIDTH] IN BLOOD BY AUTOMATED COUNT: 50.2 FL (ref 35.9–50)
HCG SERPL QL: NEGATIVE
HCT VFR BLD AUTO: 43.4 % (ref 37–47)
HGB BLD-MCNC: 13.9 G/DL (ref 12–16)
IMM GRANULOCYTES # BLD AUTO: 0.03 K/UL (ref 0–0.11)
IMM GRANULOCYTES NFR BLD AUTO: 0.5 % (ref 0–0.9)
LYMPHOCYTES # BLD AUTO: 1.31 K/UL (ref 1–4.8)
LYMPHOCYTES NFR BLD: 21.3 % (ref 22–41)
MCH RBC QN AUTO: 28.7 PG (ref 27–33)
MCHC RBC AUTO-ENTMCNC: 32 G/DL (ref 33.6–35)
MCV RBC AUTO: 89.7 FL (ref 81.4–97.8)
MONOCYTES # BLD AUTO: 0.41 K/UL (ref 0–0.85)
MONOCYTES NFR BLD AUTO: 6.7 % (ref 0–13.4)
NEUTROPHILS # BLD AUTO: 4.34 K/UL (ref 2–7.15)
NEUTROPHILS NFR BLD: 70.5 % (ref 44–72)
NRBC # BLD AUTO: 0 K/UL
NRBC BLD-RTO: 0 /100 WBC
PATHOLOGY CONSULT NOTE: NORMAL
PLATELET # BLD AUTO: 266 K/UL (ref 164–446)
PMV BLD AUTO: 10.8 FL (ref 9–12.9)
RBC # BLD AUTO: 4.84 M/UL (ref 4.2–5.4)
WBC # BLD AUTO: 6.2 K/UL (ref 4.8–10.8)

## 2021-05-12 PROCEDURE — 160035 HCHG PACU - 1ST 60 MINS PHASE I: Performed by: OBSTETRICS & GYNECOLOGY

## 2021-05-12 PROCEDURE — 700101 HCHG RX REV CODE 250: Performed by: OBSTETRICS & GYNECOLOGY

## 2021-05-12 PROCEDURE — A9270 NON-COVERED ITEM OR SERVICE: HCPCS | Performed by: OBSTETRICS & GYNECOLOGY

## 2021-05-12 PROCEDURE — 160036 HCHG PACU - EA ADDL 30 MINS PHASE I: Performed by: OBSTETRICS & GYNECOLOGY

## 2021-05-12 PROCEDURE — 58662 LAPAROSCOPY EXCISE LESIONS: CPT | Performed by: OBSTETRICS & GYNECOLOGY

## 2021-05-12 PROCEDURE — 58661 LAPAROSCOPY REMOVE ADNEXA: CPT | Performed by: OBSTETRICS & GYNECOLOGY

## 2021-05-12 PROCEDURE — 700105 HCHG RX REV CODE 258: Performed by: OBSTETRICS & GYNECOLOGY

## 2021-05-12 PROCEDURE — 700101 HCHG RX REV CODE 250: Performed by: ANESTHESIOLOGY

## 2021-05-12 PROCEDURE — 700105 HCHG RX REV CODE 258: Performed by: ANESTHESIOLOGY

## 2021-05-12 PROCEDURE — 500868 HCHG NEEDLE, SURGI(VARES): Performed by: OBSTETRICS & GYNECOLOGY

## 2021-05-12 PROCEDURE — 160046 HCHG PACU - 1ST 60 MINS PHASE II: Performed by: OBSTETRICS & GYNECOLOGY

## 2021-05-12 PROCEDURE — 88302 TISSUE EXAM BY PATHOLOGIST: CPT

## 2021-05-12 PROCEDURE — 84703 CHORIONIC GONADOTROPIN ASSAY: CPT

## 2021-05-12 PROCEDURE — 700111 HCHG RX REV CODE 636 W/ 250 OVERRIDE (IP): Performed by: ANESTHESIOLOGY

## 2021-05-12 PROCEDURE — 85025 COMPLETE CBC W/AUTO DIFF WBC: CPT

## 2021-05-12 PROCEDURE — 160047 HCHG PACU  - EA ADDL 30 MINS PHASE II: Performed by: OBSTETRICS & GYNECOLOGY

## 2021-05-12 PROCEDURE — 160009 HCHG ANES TIME/MIN: Performed by: OBSTETRICS & GYNECOLOGY

## 2021-05-12 PROCEDURE — 700102 HCHG RX REV CODE 250 W/ 637 OVERRIDE(OP): Performed by: ANESTHESIOLOGY

## 2021-05-12 PROCEDURE — 160002 HCHG RECOVERY MINUTES (STAT): Performed by: OBSTETRICS & GYNECOLOGY

## 2021-05-12 PROCEDURE — 500886 HCHG PACK, LAPAROSCOPY: Performed by: OBSTETRICS & GYNECOLOGY

## 2021-05-12 PROCEDURE — 160041 HCHG SURGERY MINUTES - EA ADDL 1 MIN LEVEL 4: Performed by: OBSTETRICS & GYNECOLOGY

## 2021-05-12 PROCEDURE — 160025 RECOVERY II MINUTES (STATS): Performed by: OBSTETRICS & GYNECOLOGY

## 2021-05-12 PROCEDURE — 88307 TISSUE EXAM BY PATHOLOGIST: CPT

## 2021-05-12 PROCEDURE — 58662 LAPAROSCOPY EXCISE LESIONS: CPT | Mod: 80 | Performed by: OBSTETRICS & GYNECOLOGY

## 2021-05-12 PROCEDURE — 501838 HCHG SUTURE GENERAL: Performed by: OBSTETRICS & GYNECOLOGY

## 2021-05-12 PROCEDURE — 58661 LAPAROSCOPY REMOVE ADNEXA: CPT | Mod: 80 | Performed by: OBSTETRICS & GYNECOLOGY

## 2021-05-12 PROCEDURE — 160048 HCHG OR STATISTICAL LEVEL 1-5: Performed by: OBSTETRICS & GYNECOLOGY

## 2021-05-12 PROCEDURE — A9270 NON-COVERED ITEM OR SERVICE: HCPCS | Performed by: ANESTHESIOLOGY

## 2021-05-12 PROCEDURE — 160029 HCHG SURGERY MINUTES - 1ST 30 MINS LEVEL 4: Performed by: OBSTETRICS & GYNECOLOGY

## 2021-05-12 PROCEDURE — 500002 HCHG ADHESIVE, DERMABOND: Performed by: OBSTETRICS & GYNECOLOGY

## 2021-05-12 PROCEDURE — 501572 HCHG TROCAR, SHIELD OBTU 5X100: Performed by: OBSTETRICS & GYNECOLOGY

## 2021-05-12 RX ORDER — DEXAMETHASONE SODIUM PHOSPHATE 4 MG/ML
INJECTION, SOLUTION INTRA-ARTICULAR; INTRALESIONAL; INTRAMUSCULAR; INTRAVENOUS; SOFT TISSUE PRN
Status: DISCONTINUED | OUTPATIENT
Start: 2021-05-12 | End: 2021-05-12 | Stop reason: SURG

## 2021-05-12 RX ORDER — ONDANSETRON 2 MG/ML
4 INJECTION INTRAMUSCULAR; INTRAVENOUS
Status: COMPLETED | OUTPATIENT
Start: 2021-05-12 | End: 2021-05-12

## 2021-05-12 RX ORDER — OXYCODONE HCL 5 MG/5 ML
10 SOLUTION, ORAL ORAL
Status: COMPLETED | OUTPATIENT
Start: 2021-05-12 | End: 2021-05-12

## 2021-05-12 RX ORDER — ACETAMINOPHEN 500 MG
1000 TABLET ORAL ONCE
Status: COMPLETED | OUTPATIENT
Start: 2021-05-12 | End: 2021-05-12

## 2021-05-12 RX ORDER — OXYCODONE HCL 5 MG/5 ML
5 SOLUTION, ORAL ORAL
Status: COMPLETED | OUTPATIENT
Start: 2021-05-12 | End: 2021-05-12

## 2021-05-12 RX ORDER — HYDROMORPHONE HYDROCHLORIDE 1 MG/ML
0.1 INJECTION, SOLUTION INTRAMUSCULAR; INTRAVENOUS; SUBCUTANEOUS
Status: DISCONTINUED | OUTPATIENT
Start: 2021-05-12 | End: 2021-05-12 | Stop reason: HOSPADM

## 2021-05-12 RX ORDER — SODIUM CHLORIDE, SODIUM LACTATE, POTASSIUM CHLORIDE, CALCIUM CHLORIDE 600; 310; 30; 20 MG/100ML; MG/100ML; MG/100ML; MG/100ML
INJECTION, SOLUTION INTRAVENOUS CONTINUOUS
Status: DISCONTINUED | OUTPATIENT
Start: 2021-05-12 | End: 2021-05-12 | Stop reason: HOSPADM

## 2021-05-12 RX ORDER — OXYCODONE HYDROCHLORIDE AND ACETAMINOPHEN 5; 325 MG/1; MG/1
1 TABLET ORAL EVERY 4 HOURS PRN
Qty: 10 TABLET | Refills: 0 | Status: SHIPPED | OUTPATIENT
Start: 2021-05-12 | End: 2021-05-15

## 2021-05-12 RX ORDER — LORAZEPAM 2 MG/ML
0.5 INJECTION INTRAMUSCULAR
Status: DISCONTINUED | OUTPATIENT
Start: 2021-05-12 | End: 2021-05-12 | Stop reason: HOSPADM

## 2021-05-12 RX ORDER — CEFAZOLIN SODIUM 1 G/3ML
INJECTION, POWDER, FOR SOLUTION INTRAMUSCULAR; INTRAVENOUS PRN
Status: DISCONTINUED | OUTPATIENT
Start: 2021-05-12 | End: 2021-05-12 | Stop reason: SURG

## 2021-05-12 RX ORDER — MEPERIDINE HYDROCHLORIDE 25 MG/ML
12.5 INJECTION INTRAMUSCULAR; INTRAVENOUS; SUBCUTANEOUS
Status: DISCONTINUED | OUTPATIENT
Start: 2021-05-12 | End: 2021-05-12 | Stop reason: HOSPADM

## 2021-05-12 RX ORDER — METOCLOPRAMIDE HYDROCHLORIDE 5 MG/ML
INJECTION INTRAMUSCULAR; INTRAVENOUS PRN
Status: DISCONTINUED | OUTPATIENT
Start: 2021-05-12 | End: 2021-05-12 | Stop reason: SURG

## 2021-05-12 RX ORDER — IBUPROFEN 800 MG/1
800 TABLET ORAL EVERY 8 HOURS PRN
Qty: 30 TABLET | Refills: 0 | Status: SHIPPED | OUTPATIENT
Start: 2021-05-12 | End: 2021-11-30

## 2021-05-12 RX ORDER — LABETALOL HYDROCHLORIDE 5 MG/ML
5 INJECTION, SOLUTION INTRAVENOUS
Status: DISCONTINUED | OUTPATIENT
Start: 2021-05-12 | End: 2021-05-12 | Stop reason: HOSPADM

## 2021-05-12 RX ORDER — GABAPENTIN 300 MG/1
300 CAPSULE ORAL ONCE
Status: COMPLETED | OUTPATIENT
Start: 2021-05-12 | End: 2021-05-12

## 2021-05-12 RX ORDER — DIPHENHYDRAMINE HYDROCHLORIDE 50 MG/ML
12.5 INJECTION INTRAMUSCULAR; INTRAVENOUS
Status: DISCONTINUED | OUTPATIENT
Start: 2021-05-12 | End: 2021-05-12 | Stop reason: HOSPADM

## 2021-05-12 RX ORDER — HALOPERIDOL 5 MG/ML
1 INJECTION INTRAMUSCULAR
Status: DISCONTINUED | OUTPATIENT
Start: 2021-05-12 | End: 2021-05-12 | Stop reason: HOSPADM

## 2021-05-12 RX ORDER — SODIUM CHLORIDE, SODIUM LACTATE, POTASSIUM CHLORIDE, CALCIUM CHLORIDE 600; 310; 30; 20 MG/100ML; MG/100ML; MG/100ML; MG/100ML
INJECTION, SOLUTION INTRAVENOUS CONTINUOUS
Status: ACTIVE | OUTPATIENT
Start: 2021-05-12 | End: 2021-05-12

## 2021-05-12 RX ORDER — HYDROMORPHONE HYDROCHLORIDE 1 MG/ML
0.2 INJECTION, SOLUTION INTRAMUSCULAR; INTRAVENOUS; SUBCUTANEOUS
Status: DISCONTINUED | OUTPATIENT
Start: 2021-05-12 | End: 2021-05-12 | Stop reason: HOSPADM

## 2021-05-12 RX ORDER — HYDRALAZINE HYDROCHLORIDE 20 MG/ML
5 INJECTION INTRAMUSCULAR; INTRAVENOUS
Status: DISCONTINUED | OUTPATIENT
Start: 2021-05-12 | End: 2021-05-12 | Stop reason: HOSPADM

## 2021-05-12 RX ORDER — ONDANSETRON 2 MG/ML
INJECTION INTRAMUSCULAR; INTRAVENOUS PRN
Status: DISCONTINUED | OUTPATIENT
Start: 2021-05-12 | End: 2021-05-12 | Stop reason: SURG

## 2021-05-12 RX ORDER — BUPIVACAINE HYDROCHLORIDE 2.5 MG/ML
INJECTION, SOLUTION EPIDURAL; INFILTRATION; INTRACAUDAL
Status: DISCONTINUED
Start: 2021-05-12 | End: 2021-05-12 | Stop reason: HOSPADM

## 2021-05-12 RX ORDER — BUPIVACAINE HYDROCHLORIDE AND EPINEPHRINE 2.5; 5 MG/ML; UG/ML
INJECTION, SOLUTION EPIDURAL; INFILTRATION; INTRACAUDAL; PERINEURAL
Status: DISCONTINUED | OUTPATIENT
Start: 2021-05-12 | End: 2021-05-12 | Stop reason: HOSPADM

## 2021-05-12 RX ORDER — HYDROMORPHONE HYDROCHLORIDE 1 MG/ML
0.4 INJECTION, SOLUTION INTRAMUSCULAR; INTRAVENOUS; SUBCUTANEOUS
Status: DISCONTINUED | OUTPATIENT
Start: 2021-05-12 | End: 2021-05-12 | Stop reason: HOSPADM

## 2021-05-12 RX ORDER — LIDOCAINE HYDROCHLORIDE 20 MG/ML
INJECTION, SOLUTION EPIDURAL; INFILTRATION; INTRACAUDAL; PERINEURAL PRN
Status: DISCONTINUED | OUTPATIENT
Start: 2021-05-12 | End: 2021-05-12 | Stop reason: SURG

## 2021-05-12 RX ORDER — SODIUM CHLORIDE, SODIUM LACTATE, POTASSIUM CHLORIDE, CALCIUM CHLORIDE 600; 310; 30; 20 MG/100ML; MG/100ML; MG/100ML; MG/100ML
INJECTION, SOLUTION INTRAVENOUS
Status: DISCONTINUED | OUTPATIENT
Start: 2021-05-12 | End: 2021-05-12 | Stop reason: SURG

## 2021-05-12 RX ORDER — KETOROLAC TROMETHAMINE 30 MG/ML
INJECTION, SOLUTION INTRAMUSCULAR; INTRAVENOUS PRN
Status: DISCONTINUED | OUTPATIENT
Start: 2021-05-12 | End: 2021-05-12 | Stop reason: SURG

## 2021-05-12 RX ADMIN — FENTANYL CITRATE 25 MCG: 50 INJECTION, SOLUTION INTRAMUSCULAR; INTRAVENOUS at 09:28

## 2021-05-12 RX ADMIN — ONDANSETRON 4 MG: 2 INJECTION INTRAMUSCULAR; INTRAVENOUS at 13:08

## 2021-05-12 RX ADMIN — SODIUM CHLORIDE, POTASSIUM CHLORIDE, SODIUM LACTATE AND CALCIUM CHLORIDE: 600; 310; 30; 20 INJECTION, SOLUTION INTRAVENOUS at 09:24

## 2021-05-12 RX ADMIN — ROCURONIUM BROMIDE 30 MG: 10 INJECTION, SOLUTION INTRAVENOUS at 09:28

## 2021-05-12 RX ADMIN — ONDANSETRON 4 MG: 2 INJECTION INTRAMUSCULAR; INTRAVENOUS at 10:27

## 2021-05-12 RX ADMIN — ACETAMINOPHEN 1000 MG: 500 TABLET ORAL at 08:29

## 2021-05-12 RX ADMIN — FENTANYL CITRATE 50 MCG: 50 INJECTION, SOLUTION INTRAMUSCULAR; INTRAVENOUS at 10:33

## 2021-05-12 RX ADMIN — METOCLOPRAMIDE 10 MG: 5 INJECTION, SOLUTION INTRAMUSCULAR; INTRAVENOUS at 10:27

## 2021-05-12 RX ADMIN — LORAZEPAM 0.5 MG: 2 INJECTION INTRAMUSCULAR; INTRAVENOUS at 08:55

## 2021-05-12 RX ADMIN — OXYCODONE HYDROCHLORIDE 10 MG: 5 SOLUTION ORAL at 11:29

## 2021-05-12 RX ADMIN — DEXAMETHASONE SODIUM PHOSPHATE 8 MG: 4 INJECTION, SOLUTION INTRA-ARTICULAR; INTRALESIONAL; INTRAMUSCULAR; INTRAVENOUS; SOFT TISSUE at 09:28

## 2021-05-12 RX ADMIN — KETOROLAC TROMETHAMINE 30 MG: 30 INJECTION, SOLUTION INTRAMUSCULAR at 10:27

## 2021-05-12 RX ADMIN — FENTANYL CITRATE 25 MCG: 50 INJECTION, SOLUTION INTRAMUSCULAR; INTRAVENOUS at 11:29

## 2021-05-12 RX ADMIN — SUGAMMADEX 200 MG: 100 INJECTION, SOLUTION INTRAVENOUS at 10:33

## 2021-05-12 RX ADMIN — FENTANYL CITRATE 25 MCG: 50 INJECTION, SOLUTION INTRAMUSCULAR; INTRAVENOUS at 09:51

## 2021-05-12 RX ADMIN — Medication 100 MG: at 09:28

## 2021-05-12 RX ADMIN — CEFAZOLIN 2 G: 330 INJECTION, POWDER, FOR SOLUTION INTRAMUSCULAR; INTRAVENOUS at 09:28

## 2021-05-12 RX ADMIN — ROCURONIUM BROMIDE 10 MG: 10 INJECTION, SOLUTION INTRAVENOUS at 10:06

## 2021-05-12 RX ADMIN — SODIUM CHLORIDE, POTASSIUM CHLORIDE, SODIUM LACTATE AND CALCIUM CHLORIDE: 600; 310; 30; 20 INJECTION, SOLUTION INTRAVENOUS at 08:33

## 2021-05-12 RX ADMIN — PROPOFOL 200 MG: 10 INJECTION, EMULSION INTRAVENOUS at 09:28

## 2021-05-12 RX ADMIN — GABAPENTIN 300 MG: 300 CAPSULE ORAL at 08:30

## 2021-05-12 RX ADMIN — POVIDONE IODINE 15 ML: 100 SOLUTION TOPICAL at 08:30

## 2021-05-12 RX ADMIN — LIDOCAINE HYDROCHLORIDE 100 MG: 20 INJECTION, SOLUTION EPIDURAL; INFILTRATION; INTRACAUDAL at 09:28

## 2021-05-12 ASSESSMENT — PAIN DESCRIPTION - PAIN TYPE
TYPE: SURGICAL PAIN

## 2021-05-12 ASSESSMENT — FIBROSIS 4 INDEX: FIB4 SCORE: 0.39

## 2021-05-12 NOTE — OP REPORT
DATE OF SERVICE:  2021     PROCEDURES:    1.  Laparoscopic bilateral salpingectomy.  2.  Left ovarian cystectomy.  3.  Lysis of adhesions.     PREOPERATIVE DIAGNOSES:    1.  Desires permanent sterilization.  2.  Persistent left adnexal mass.     POSTOPERATIVE DIAGNOSES:    1.  Desires permanent sterilization.  2.  Persistent left adnexal mass.     SURGEON:  Delisa Payton MD     FIRST ASSISTANT:  Wali Johnson MD     ANESTHESIA:  General.     COMPLICATIONS:  None.     FINDINGS:  Normal appearing uterus.  The left ovary had a 4 cm mucinous   appearing cyst.  The left tube was normal.  The right ovary and tube were   encapsulated in adhesions.  There was no other evidence of pelvic adhesive   disease.     ESTIMATED BLOOD LOSS:  25 mL.     URINE OUTPUT:  300 mL.     IV FLUIDS:  1000 mL of LR.     INDICATIONS FOR PROCEDURE:  This is a 25-year-old  3, para 1-1-1-2, who   desires permanent sterilization.  She also has a history of a persistent left   adnexal mass, which was discovered incidentally on ultrasound while pregnant.     PROCEDURE IN DETAIL:  The patient was taken to the operating room and placed   upon the operating table in the supine position where general anesthesia was   administered and found to be adequate.  She was then repositioned in low   lithotomy position in Kahlil stirru.  An exam under anesthesia revealed no   palpable adnexal masses.  She was then prepped and draped in the usual sterile   fashion.  Her bladder was drained by catheterization.  A bivalve speculum was   placed into the vagina for visualization of the cervix.  The anterior lip of   the cervix was grasped with a single tooth tenaculum and then Braden Araya   cannula was placed for uterine manipulation.  The bivalve speculum was then   removed.  Attention was then turned to the abdomen where 3 mL of 0.25%   Marcaine with epinephrine was then injected on the inferior aspect of the   umbilicus.  A 5 mm vertical skin  incision was made with the scalpel at the   base of the umbilicus.  The Veress needle was inserted into the peritoneal   cavity with opening pressure was noted to be 5 mmHg.  A hanging drop test   revealed free flow of fluid.  The abdomen was insufflated with 3 liters of   CO2.  The Veress needle was removed and a 5 mm sharp trocar and sheath were   advanced.  Proper placement was confirmed with the laparoscope and no evidence   of bowel injury was noted.  Attention was then turned to the left lower   quadrant.  The laparoscope was used to transilluminate the right lower   quadrant in an avascular area and the area was injected with 2 mL of 0.25%   Marcaine with epinephrine.  A 5 mm skin incision was made with the scalpel and   a 5 mm sharp trocar and sleeve were advanced under direct visualization.    Attention was then turned to the right lower quadrant, which was   transilluminated with the laparoscope and an avascular area was identified and   2 mL of 0.25% Marcaine with epinephrine was injected.  A 5 mm skin incision   was made with the scalpel and a 5 mm sharp trocar and sleeve were advanced   under direct visualization.  A brief survey of the pelvis revealed the   findings noted above.  The left tube was grasped with the Prestige graspers   and followed out to the fimbriated end.  Light adhesions were lysed with the   LigaSure device involving the ovary and the entire mesosalpinx on the inferior   surface of the tube was cauterized and cut with the LigaSure device.  The   tube was amputated at the uterine cornua and removed.  Attention was then   turned to the right fallopian tube, which was grasped with a Prestige grasper.    This tube was noted to be encapsulated in adhesions involving the ovary.    These adhesions were lysed with the LigaSure device.  The tube was placed on   tension and the inferior mesosalpinx was cauterized and cut with LigaSure   device along the entire inferior margin of the tube.   The tube was amputated   at the cornua and removed from the abdomen.  Attention was then turned to the   left ovary.  This was carefully inspected and attempts were made to drain the   cyst with a needle. Viscous fluid was noted consistent with a mucinous   cystadenoma.  An incision was made with the EndoShears on the surface of the   ovary and the cyst was noted to be ruptured and then the cyst wall was gently   dissected off the ovary and removed.  The ovary was irrigated.  Bleeding areas   were cauterized with the LigaSure device and Andrey was applied to the   interior surface of the ovary.  The pelvis was irrigated and all fluid was   removed from the pelvis.  All operative areas were noted to be hemostatic.    All trocars were removed under direct visualization.  The skin incisions were   closed with a subcuticular stitch of 4-0 Monocryl and then Dermabond was   applied to the incisions.  All instruments were removed from the vagina.  The   patient tolerated the procedure well.  There were no complications.        ______________________________  Delisa Payton MD [facility]    DD:  05/12/2021 10:57  DT:  05/12/2021 11:47    Job#:  [id number]

## 2021-05-12 NOTE — ANESTHESIA POSTPROCEDURE EVALUATION
Patient: Debi Juarez    Procedure Summary     Date: 05/12/21 Room / Location: [facility] ROOM 25 / SURGERY SAME DAY [facility]    Anesthesia Start: 0924 Anesthesia Stop: 1040    Procedures:       PELVISCOPY, LYSIS OF ADHESIONS (Abdomen)      SALPINGECTOMY (Bilateral Abdomen)      OVARIAN CYSTECTOMY (Left Abdomen) Diagnosis: (PERMANENT STERILIZATION, left adnexal mass)    Surgeons: Delisa Payton M.D. Responsible Provider: James Teresa M.D.    Anesthesia Type: general ASA Status: 2          Final Anesthesia Type: general  Last vitals  BP   Blood Pressure: 121/85    Temp   36.4 °C (97.5 °F)    Pulse   81   Resp   12    SpO2   94 %      Anesthesia Post Evaluation    Patient location during evaluation: PACU  Patient participation: complete - patient participated  Level of consciousness: awake and alert    Airway patency: patent  Anesthetic complications: no  Cardiovascular status: hemodynamically stable  Respiratory status: acceptable  Hydration status: euvolemic    PONV: none          No complications documented.     Nurse Pain Score: 0 (NPRS)

## 2021-05-12 NOTE — ANESTHESIA PREPROCEDURE EVALUATION
Relevant Problems   Other   (positive) Anxiety and depression   (positive) Ovarian cyst, left       Physical Exam    Airway   Mallampati: II  TM distance: <3 FB  Neck ROM: full       Cardiovascular - normal exam  Rhythm: regular  Rate: normal  (-) murmur     Dental - normal exam           Pulmonary - normal exam  Breath sounds clear to auscultation     Abdominal    Neurological - normal exam                 Anesthesia Plan    ASA 2       Plan - general       Airway plan will be ETT          Induction: intravenous      Pertinent diagnostic labs and testing reviewed    Informed Consent:    Anesthetic plan and risks discussed with patient.

## 2021-05-12 NOTE — DISCHARGE INSTRUCTIONS
ACTIVITY: Rest and take it easy for the first 24 hours.  A responsible adult is recommended to remain with you during that time.  It is normal to feel sleepy.  We encourage you to not do anything that requires balance, judgment or coordination.    MILD FLU-LIKE SYMPTOMS ARE NORMAL. YOU MAY EXPERIENCE GENERALIZED MUSCLE ACHES, THROAT IRRITATION, HEADACHE AND/OR SOME NAUSEA.    FOR 24 HOURS DO NOT:  Drive, operate machinery or run household appliances.  Drink beer or alcoholic beverages.   Make important decisions or sign legal documents.    SPECIAL INSTRUCTIONS: See Handout    DIET: To avoid nausea, slowly advance diet as tolerated, avoiding spicy or greasy foods for the first day.  Add more substantial food to your diet according to your physician's instructions.  Babies can be fed formula or breast milk as soon as they are hungry.  INCREASE FLUIDS AND FIBER TO AVOID CONSTIPATION.    SURGICAL DRESSING/BATHING: See Handout    FOLLOW-UP APPOINTMENT:  A follow-up appointment should be arranged with your doctor; call to schedule.    You should CALL YOUR PHYSICIAN if you develop:  Fever greater than 101 degrees F.  Pain not relieved by medication, or persistent nausea or vomiting.  Excessive bleeding (blood soaking through dressing) or unexpected drainage from the wound.  Extreme redness or swelling around the incision site, drainage of pus or foul smelling drainage.  Inability to urinate or empty your bladder within 8 hours.  Problems with breathing or chest pain.    You should call 911 if you develop problems with breathing or chest pain.  If you are unable to contact your doctor or surgical center, you should go to the nearest emergency room or urgent care center.  Physician's telephone #: [phone]    If any questions arise, call your doctor.  If your doctor is not available, please feel free to call the Surgical Center at [phone]. The Contact Center is open Monday through Friday 7AM to 5PM and may speak  to a nurse at [phone], or toll free at [phone].     A registered nurse may call you a few days after your surgery to see how you are doing after your procedure.    MEDICATIONS: Resume taking daily medication.  Take prescribed pain medication with food.  If no medication is prescribed, you may take non-aspirin pain medication if needed.  PAIN MEDICATION CAN BE VERY CONSTIPATING.  Take a stool softener or laxative such as senokot, pericolace, or milk of magnesia if needed.    Prescription given for Percocet 5-325.  Last pain medication given at 11:29 am.  [id number] - [place], [place] - [address] [facility] [address]   [address], [place] [address]   Phone:  [phone]  Fax:  [phone]   [facility] #:  [id number]      If your physician has prescribed pain medication that includes Acetaminophen (Tylenol), do not take additional Acetaminophen (Tylenol) while taking the prescribed medication.    Depression / Suicide Risk    As you are discharged from this [facility] facility, it is important to learn how to keep safe from harming yourself.    Recognize the warning signs:  · Abrupt changes in personality, positive or negative- including increase in energy   · Giving away possessions  · Change in eating patterns- significant weight changes-  positive or negative  · Change in sleeping patterns- unable to sleep or sleeping all the time   · Unwillingness or inability to communicate  · Depression  · Unusual sadness, discouragement and loneliness  · Talk of wanting to die  · Neglect of personal appearance   · Rebelliousness- reckless behavior  · Withdrawal from people/activities they love  · Confusion- inability to concentrate     If you or a loved one observes any of these behaviors or has concerns about self-harm, here's what you can do:  · Talk about it- your feelings and reasons for harming yourself  · Remove any means that you might use to hurt yourself  (examples: pills, rope, extension cords, firearm)  · Get professional help from the community (Mental Health, Substance Abuse, psychological counseling)  · Do not be alone:Call your Safe Contact- someone whom you trust who will be there for you.  · Call your local CRISIS HOTLINE 191-1989 or 396-748-8683  · Call your local Children's Mobile Crisis Response Team [place] (196) 137-5138 or www.AchaLa  · Call the toll free National Suicide Prevention Hotlines   · National Suicide Prevention Lifeline 763-081-JKJE (2107)  · National Hope Line Network 800-SUICIDE (380-5338)

## 2021-05-12 NOTE — OR SURGEON
Immediate Post OP Note    PreOp Diagnosis: 1.  Desires permanent sterilization  2. Left adnexal mass      PostOp Diagnosis: Same      Procedure(s):  PELVISCOPY, LYSIS OF ADHESIONS - Wound Class: Clean  BILATERAL SALPINGECTOMY - Wound Class: Clean  OVARIAN CYSTECTOMY - Wound Class: Clean    Surgeon(s):  RASTA Taylor M.D.    Anesthesiologist/Type of Anesthesia:  Anesthesiologist: James Teresa M.D./General    Surgical Staff:  Circulator: Bri Garcia R.N.  Scrub Person: Alma Marin    Specimens removed if any:  ID Type Source Tests Collected by Time Destination   A : BILATERAL FALLOPIAN TUBES Tissue Fallopian Tube PATHOLOGY SPECIMEN Delisa Payton M.D. 5/12/2021 10:16 AM    B : LEFT OVARIAN CYST WALL Tissue Ovary PATHOLOGY SPECIMEN Delisa Payton M.D. 5/12/2021 10:16 AM        Estimated Blood Loss: 25 cc    Findings: Normal uterus.  Left ovary with 4 cm mucinous cyst.  Left tube normal.  Right tube and ovary encased in filmy adhesions.     Complications: None        5/12/2021 10:39 AM Delisa Payton M.D.

## 2021-05-12 NOTE — ANESTHESIA TIME REPORT
Anesthesia Start and Stop Event Times     Date Time Event    5/12/2021 0908 Ready for Procedure     0924 Anesthesia Start     1040 Anesthesia Stop        Responsible Staff  05/12/21    Name Role Begin End    James Teresa M.D. Anesth 0924 1040        Preop Diagnosis (Free Text):  Pre-op Diagnosis     PERMANENT STERILIZATION, left adnexal mass        Preop Diagnosis (Codes):    Post op Diagnosis  Ovarian cyst      Premium Reason  Non-Premium    Comments:

## 2021-05-12 NOTE — ANESTHESIA PROCEDURE NOTES
Airway    Date/Time: 5/12/2021 9:29 AM  Performed by: James Teresa M.D.  Authorized by: James Teresa M.D.     Location:  OR  Urgency:  Elective  Difficult Airway: No    Indications for Airway Management:  Anesthesia      Spontaneous Ventilation: absent    Sedation Level:  Deep  Preoxygenated: Yes    Patient Position:  Sniffing  Final Airway Type:  Endotracheal airway  Final Endotracheal Airway:  ETT  Cuffed: Yes    Technique Used for Successful ETT Placement:  Direct laryngoscopy  Devices/Methods Used in Placement:  Intubating stylet    Insertion Site:  Oral  Blade Type:  Surya  Laryngoscope Blade/Videolaryngoscope Blade Size:  3  ETT Size (mm):  7.0  Measured from:  Teeth  ETT to Teeth (cm):  21  Placement Verified by: auscultation and capnometry    Cormack-Lehane Classification:  Grade I - full view of glottis  Number of Attempts at Approach:  1

## 2021-05-12 NOTE — OR NURSING
1300 discharge paper work gone over with pt sister. She verbalized understanding and had no questions or concerns.   1305 pt vomited x 2.

## 2021-05-12 NOTE — OR NURSING
1038-Pt arrived from PACU. Report received. VSS. Pt oxygenating well on 6L oxymask. Incisions assessed x3. Incisions dressed with dermabond. Kristine pad assessed. Pad CDI.    1100-Pt resting comfortably.    1129 - Pt reporting pain. 10mg oxycodone and 25mcg fentanyl given.    1200-Pt resting comfortably.    1238-Patient meets phase two criteria. Handoff report to MORAIMA Hercules.      1242-Pt transported to phase two via RN.

## 2021-05-25 ENCOUNTER — OFFICE VISIT (OUTPATIENT)
Dept: MEDICAL GROUP | Facility: MEDICAL CENTER | Age: 26
End: 2021-05-25
Attending: PHYSICIAN ASSISTANT
Payer: MEDICAID

## 2021-05-25 VITALS
BODY MASS INDEX: 20.98 KG/M2 | OXYGEN SATURATION: 93 % | SYSTOLIC BLOOD PRESSURE: 100 MMHG | HEIGHT: 64 IN | TEMPERATURE: 98.6 F | RESPIRATION RATE: 17 BRPM | DIASTOLIC BLOOD PRESSURE: 70 MMHG | WEIGHT: 122.9 LBS | HEART RATE: 75 BPM

## 2021-05-25 DIAGNOSIS — F41.9 ANXIETY AND DEPRESSION: ICD-10-CM

## 2021-05-25 DIAGNOSIS — F32.A ANXIETY AND DEPRESSION: ICD-10-CM

## 2021-05-25 PROCEDURE — 99212 OFFICE O/P EST SF 10 MIN: CPT | Performed by: PHYSICIAN ASSISTANT

## 2021-05-25 PROCEDURE — 99213 OFFICE O/P EST LOW 20 MIN: CPT | Performed by: PHYSICIAN ASSISTANT

## 2021-05-25 ASSESSMENT — FIBROSIS 4 INDEX: FIB4 SCORE: 0.28

## 2021-05-25 NOTE — ASSESSMENT & PLAN NOTE
Debi presents today for follow-up.  She reports that she has establish care with a psychologist for CBT which she attends every Wednesday and a psychiatrist who she sees every 4 weeks.  She reports that he has noticed her anxiety waxes and wanes.  She plans to discuss this further with her psychiatrist and see if the medication would be beneficial for this.  No SI or HI.

## 2021-05-25 NOTE — PROGRESS NOTES
Chief Complaint   Patient presents with   • Other     FOLLOW UP     Subjective:     HPI:   Debi Juarez is a 25 y.o. female here to discuss the evaluation and management of:    Anxiety and depression  Debi presents today for follow-up.  She reports that she has establish care with a psychologist for CBT which she attends every Wednesday and a psychiatrist who she sees every 4 weeks.  She reports that he has noticed her anxiety waxes and wanes.  She plans to discuss this further with her psychiatrist and see if the medication would be beneficial for this.  No SI or HI.    ROS  See HPI.     No Known Allergies    Current medicines (including changes today)  Current Outpatient Medications   Medication Sig Dispense Refill   • ibuprofen (MOTRIN) 800 MG Tab Take 1 tablet by mouth every 8 hours as needed for Moderate Pain. 30 tablet 0   • sertraline (ZOLOFT) 50 MG Tab Take 50 mg by mouth every day.     • Prenatal MV-Min-Fe Fum-FA-DHA (PRENATAL 1 PO) Take  by mouth.       No current facility-administered medications for this visit.       Social History     Tobacco Use   • Smoking status: Never Smoker   • Smokeless tobacco: Never Used   Vaping Use   • Vaping Use: Never used   Substance Use Topics   • Alcohol use: No   • Drug use: No       Patient Active Problem List    Diagnosis Date Noted   • Anxiety and depression 2021   • Ovarian cyst, left 2021   •  (vaginal birth after ) 2021   • Postpartum care and examination of lactating mother 2021       Family History   Problem Relation Age of Onset   • No Known Problems Mother    • No Known Problems Father    • No Known Problems Sister    • No Known Problems Brother    • Cancer Maternal Grandmother    • No Known Problems Maternal Grandfather    • Stroke Paternal Grandmother    • No Known Problems Paternal Grandfather         Objective:     /70 (BP Location: Left arm, Patient Position: Sitting, BP Cuff Size: Adult)   Pulse 75  "  Temp 37 °C (98.6 °F) (Temporal)   Resp 17   Ht 1.626 m (5' 4\")   Wt 55.7 kg (122 lb 14.4 oz)   SpO2 93%  Body mass index is 21.1 kg/m².    Physical Exam:  Physical Exam  Vitals reviewed.   Constitutional:       Appearance: Normal appearance.   HENT:      Head: Normocephalic.   Cardiovascular:      Rate and Rhythm: Normal rate and regular rhythm.      Heart sounds: Normal heart sounds.   Pulmonary:      Effort: Pulmonary effort is normal.      Breath sounds: Normal breath sounds.   Neurological:      Mental Status: She is alert and oriented to person, place, and time.      Gait: Gait is intact.   Psychiatric:         Mood and Affect: Affect normal.         Judgment: Judgment normal.       Assessment and Plan:     The following treatment plan was discussed:    1. Anxiety and depression  - This is a chronic condition.  - No SI or HI.  - Plan: Continue follow-up as scheduled with psychiatry and psychology.    Any change or worsening of signs or symptoms, patient encouraged to follow-up or report to emergency room for further evaluation. Patient verbalizes understanding and agrees.    Follow-Up: Return if symptoms worsen or fail to improve.      PLEASE NOTE: This dictation was created using voice recognition software. I have made every reasonable attempt to correct obvious errors, but I expect that there are errors of grammar and possibly content that I did not discover before finalizing the note.  "

## 2021-06-17 ENCOUNTER — HOSPITAL ENCOUNTER (OUTPATIENT)
Facility: MEDICAL CENTER | Age: 26
End: 2021-06-17
Attending: PHYSICIAN ASSISTANT
Payer: MEDICAID

## 2021-06-17 ENCOUNTER — OFFICE VISIT (OUTPATIENT)
Dept: MEDICAL GROUP | Facility: MEDICAL CENTER | Age: 26
End: 2021-06-17
Attending: PHYSICIAN ASSISTANT
Payer: MEDICAID

## 2021-06-17 VITALS
HEIGHT: 64 IN | RESPIRATION RATE: 17 BRPM | DIASTOLIC BLOOD PRESSURE: 70 MMHG | TEMPERATURE: 97.3 F | HEART RATE: 77 BPM | WEIGHT: 123.7 LBS | OXYGEN SATURATION: 96 % | SYSTOLIC BLOOD PRESSURE: 113 MMHG | BODY MASS INDEX: 21.12 KG/M2

## 2021-06-17 DIAGNOSIS — F32.A ANXIETY AND DEPRESSION: ICD-10-CM

## 2021-06-17 DIAGNOSIS — L98.9 SKIN LESION: ICD-10-CM

## 2021-06-17 DIAGNOSIS — F41.9 ANXIETY AND DEPRESSION: ICD-10-CM

## 2021-06-17 DIAGNOSIS — L02.91 ABSCESS: ICD-10-CM

## 2021-06-17 PROCEDURE — 99214 OFFICE O/P EST MOD 30 MIN: CPT | Performed by: PHYSICIAN ASSISTANT

## 2021-06-17 PROCEDURE — 99213 OFFICE O/P EST LOW 20 MIN: CPT | Performed by: PHYSICIAN ASSISTANT

## 2021-06-17 ASSESSMENT — FIBROSIS 4 INDEX: FIB4 SCORE: 0.28

## 2021-06-17 NOTE — PROGRESS NOTES
"Chief Complaint   Patient presents with   • Follow-Up     Subjective:     HPI:   Debi Juarez is a 25 y.o. female here to discuss the evaluation and management of:    Anxiety and depression  Debi presents today for follow up. She reports that she is no longer taking Zoloft 50 mg. She discontinued the medication after taking it for 6 weeks. She states that since being off the medication she has felt great. She reports that she feels her anxiety and depression are well controlled without the medication and she has been doing well overall. No SI or HI.     Skin lesion  Debi presents today for follow up. She is almost 1 month out from s/p salpingectomy, left ovarian cystectomy and lysis of abdominal adhesions. She reports that she started to develop pain and then a \"bump\" at one of the surgical insertion sites just inferior to the umbilicus. She reports that over the past few weeks it has decreased in size bu is painful and draining \"pus.\" She reports that she has not scheduled a post op follow up with her surgeon. She denies any fevers or chills.      ROS  See HPI.     No Known Allergies    Current medicines (including changes today)  Current Outpatient Medications   Medication Sig Dispense Refill   • ibuprofen (MOTRIN) 800 MG Tab Take 1 tablet by mouth every 8 hours as needed for Moderate Pain. 30 tablet 0   • Prenatal MV-Min-Fe Fum-FA-DHA (PRENATAL 1 PO) Take  by mouth.       No current facility-administered medications for this visit.       Social History     Tobacco Use   • Smoking status: Never Smoker   • Smokeless tobacco: Never Used   Vaping Use   • Vaping Use: Never used   Substance Use Topics   • Alcohol use: No   • Drug use: No       Patient Active Problem List    Diagnosis Date Noted   • Skin lesion 2021   • Anxiety and depression 2021   • Ovarian cyst, left 2021   •  (vaginal birth after ) 2021   • Postpartum care and examination of lactating mother " "01/18/2021       Family History   Problem Relation Age of Onset   • No Known Problems Mother    • No Known Problems Father    • No Known Problems Sister    • No Known Problems Brother    • Cancer Maternal Grandmother    • No Known Problems Maternal Grandfather    • Stroke Paternal Grandmother    • No Known Problems Paternal Grandfather         Objective:     /70 (BP Location: Left arm, Patient Position: Sitting, BP Cuff Size: Adult)   Pulse 77   Temp 36.3 °C (97.3 °F) (Temporal)   Resp 17   Ht 1.626 m (5' 4\")   Wt 56.1 kg (123 lb 11.2 oz)   SpO2 96%  Body mass index is 21.23 kg/m².    Physical Exam:  Physical Exam  Vitals reviewed.   HENT:      Head: Normocephalic.   Eyes:      Pupils: Pupils are equal, round, and reactive to light.   Cardiovascular:      Rate and Rhythm: Normal rate and regular rhythm.      Heart sounds: Normal heart sounds.   Pulmonary:      Effort: Pulmonary effort is normal.      Breath sounds: Normal breath sounds.   Abdominal:      Palpations: Abdomen is soft.   Musculoskeletal:         General: Normal range of motion.      Cervical back: Normal range of motion.   Skin:     General: Skin is warm and dry.      Findings: Abscess present.   Neurological:      General: No focal deficit present.      Mental Status: She is alert and oriented to person, place, and time.      Gait: Gait is intact.   Psychiatric:         Mood and Affect: Mood and affect normal.         Behavior: Behavior normal.         Judgment: Judgment normal.       Assessment and Plan:     The following treatment plan was discussed:    1. Anxiety and depression  - This is a chronic improved condition.  - No SI or HI.  - Plan: Continue to monitor.     2. Abscess  - This is an acute condition.  - Plan: Wound culture performed in clinic today on expression of contents from abscess. Obtain STAT ultrasound to evaluate the abdominal contents deep to the abscess. Encouraged patient to call and schedule a post op follow up " visit with her surgeon.   - CULTURE WOUND W/ GRAM STAIN; Future  - US-ABDOMEN LTD (SOFT TISSUE); Future    Any change or worsening of signs or symptoms, patient encouraged to follow-up or report to emergency room for further evaluation. Patient verbalizes understanding and agrees.    Follow-Up: Return if symptoms worsen or fail to improve.      PLEASE NOTE: This dictation was created using voice recognition software. I have made every reasonable attempt to correct obvious errors, but I expect that there are errors of grammar and possibly content that I did not discover before finalizing the note.

## 2021-06-17 NOTE — ASSESSMENT & PLAN NOTE
"Debi presents today for follow up. She is almost 1 month out from s/p salpingectomy, left ovarian cystectomy and lysis of abdominal adhesions. She reports that she started to develop pain and then a \"bump\" at one of the surgical insertion sites just inferior to the umbilicus. She reports that over the past few weeks it has decreased in size bu is painful and draining \"pus.\" She reports that she has not scheduled a post op follow up with her surgeon. She denies any fevers or chills.   "

## 2021-06-17 NOTE — ASSESSMENT & PLAN NOTE
Debi presents today for follow up. She reports that she is no longer taking Zoloft 50 mg. She discontinued the medication after taking it for 6 weeks. She states that since being off the medication she has felt great. She reports that she feels her anxiety and depression are well controlled without the medication and she has been doing well overall. No SI or HI.

## 2021-06-18 LAB
FORWARD REASON: SPWHY: NORMAL
FORWARDED TO LAB: SPWHR: NORMAL
SPECIMEN SENT: SPWT1: NORMAL

## 2021-06-24 DIAGNOSIS — L02.91 ABSCESS: ICD-10-CM

## 2021-06-24 RX ORDER — CIPROFLOXACIN 500 MG/1
500 TABLET, FILM COATED ORAL 2 TIMES DAILY
Qty: 10 TABLET | Refills: 0 | Status: SHIPPED | OUTPATIENT
Start: 2021-06-24 | End: 2021-11-30

## 2021-09-01 ENCOUNTER — OFFICE VISIT (OUTPATIENT)
Dept: MEDICAL GROUP | Facility: MEDICAL CENTER | Age: 26
End: 2021-09-01
Attending: PHYSICIAN ASSISTANT
Payer: MEDICAID

## 2021-09-01 VITALS
HEIGHT: 64 IN | HEART RATE: 76 BPM | SYSTOLIC BLOOD PRESSURE: 113 MMHG | TEMPERATURE: 98.3 F | BODY MASS INDEX: 20.59 KG/M2 | WEIGHT: 120.6 LBS | DIASTOLIC BLOOD PRESSURE: 80 MMHG | RESPIRATION RATE: 16 BRPM | OXYGEN SATURATION: 94 %

## 2021-09-01 DIAGNOSIS — R10.84 GENERALIZED ABDOMINAL PAIN: ICD-10-CM

## 2021-09-01 DIAGNOSIS — F32.A ANXIETY AND DEPRESSION: ICD-10-CM

## 2021-09-01 DIAGNOSIS — F41.9 ANXIETY AND DEPRESSION: ICD-10-CM

## 2021-09-01 PROCEDURE — 99213 OFFICE O/P EST LOW 20 MIN: CPT | Performed by: PHYSICIAN ASSISTANT

## 2021-09-01 RX ORDER — PROPRANOLOL HYDROCHLORIDE 10 MG/1
10 TABLET ORAL 3 TIMES DAILY PRN
Qty: 90 TABLET | Refills: 0 | Status: SHIPPED | OUTPATIENT
Start: 2021-09-01 | End: 2021-11-30

## 2021-09-01 NOTE — ASSESSMENT & PLAN NOTE
Debi presents today for follow up. She reports that her anxiety and depression have returned, in that she would like to get started back on Zoloft. She states that her anxiety has been a bit heightened and reports that she did not feel that the Zoloft helped with this as much as she would of liked in the past. She states that the anxiety comes and goes and is mostly related to social events, especially with particular people. She would like to try something more geared to anxiety in conjunction with the Zoloft if appropriate.

## 2021-09-01 NOTE — PROGRESS NOTES
"No chief complaint on file.    Subjective:     HPI:   Debi Juarez is a 25 y.o. female here to discuss the evaluation and management of:    Generalized abdominal pain  Debi presents today for follow up. She states that she never did complete the abdominal ultrasound that was ordered 2 months ago as she felt her symptoms were improving. However, she does report noticeable \"digestive issues\", generalized abdominal and pelvic pain and infections that have occurred on her finger, eye and knee. She is concerned that the initial infection that she had experienced post operatively may have causes these issues in the long run. She denies any N/V, diarrhea, constipation, melena, hematochezia, SOB, chest pain or fevers/chills.    Anxiety and depression  Debi presents today for follow up. She reports that her anxiety and depression have returned, in that she would like to get started back on Zoloft. She states that her anxiety has been a bit heightened and reports that she did not feel that the Zoloft helped with this as much as she would of liked in the past. She states that the anxiety comes and goes and is mostly related to social events, especially with particular people. She would like to try something more geared to anxiety in conjunction with the Zoloft if appropriate.     ROS  See HPI.     No Known Allergies    Current medicines (including changes today)  Current Outpatient Medications   Medication Sig Dispense Refill   • propranolol (INDERAL) 10 MG Tab Take 1 Tablet by mouth 3 times a day as needed. 90 Tablet 0   • sertraline (ZOLOFT) 50 MG Tab Take 1 Tablet by mouth every day. 50 Tablet 0   • ciprofloxacin (CIPRO) 500 MG Tab Take 1 tablet by mouth 2 times a day. 10 tablet 0   • ibuprofen (MOTRIN) 800 MG Tab Take 1 tablet by mouth every 8 hours as needed for Moderate Pain. 30 tablet 0   • Prenatal MV-Min-Fe Fum-FA-DHA (PRENATAL 1 PO) Take  by mouth.       No current facility-administered " "medications for this visit.     Social History     Tobacco Use   • Smoking status: Never Smoker   • Smokeless tobacco: Never Used   Vaping Use   • Vaping Use: Never used   Substance Use Topics   • Alcohol use: No   • Drug use: No     Patient Active Problem List    Diagnosis Date Noted   • Generalized abdominal pain 2021   • Skin lesion 2021   • Anxiety and depression 2021   • Ovarian cyst, left 2021   •  (vaginal birth after ) 2021   • Postpartum care and examination of lactating mother 2021     Family History   Problem Relation Age of Onset   • No Known Problems Mother    • No Known Problems Father    • No Known Problems Sister    • No Known Problems Brother    • Cancer Maternal Grandmother    • No Known Problems Maternal Grandfather    • Stroke Paternal Grandmother    • No Known Problems Paternal Grandfather       Objective:     /80 (BP Location: Left arm, Patient Position: Sitting, BP Cuff Size: Adult)   Pulse 76   Temp 36.8 °C (98.3 °F) (Temporal)   Resp 16   Ht 1.626 m (5' 4\")   Wt 54.7 kg (120 lb 9.6 oz)   SpO2 94%  Body mass index is 20.7 kg/m².    Physical Exam:  Physical Exam  Vitals reviewed.   Constitutional:       Appearance: Normal appearance.   HENT:      Head: Normocephalic.   Cardiovascular:      Rate and Rhythm: Normal rate and regular rhythm.      Heart sounds: Normal heart sounds.   Pulmonary:      Effort: Pulmonary effort is normal.      Breath sounds: Normal breath sounds.   Abdominal:      General: Abdomen is flat.      Palpations: Abdomen is soft.      Tenderness: There is no abdominal tenderness.   Neurological:      General: No focal deficit present.      Mental Status: She is alert and oriented to person, place, and time.   Psychiatric:         Mood and Affect: Mood normal.         Behavior: Behavior normal.       Assessment and Plan:     The following treatment plan was discussed:    1. Generalized abdominal pain  - This is a new " condition.  - No red flag signs.   - Plan: Call imaging and schedule abdominal ultrasound for further evaluation.    2. Anxiety and depression  - This is a chronic condition.   - No SI or HI.  - Plan: Start back on Zoloft at 50 mg daily for the first week. As long as she tolerates this well, she may increase the dose to 100 mg daily. Start on Propranolol as needed for social anxiety. Patient has been educated on the use and potential side effect profile of this medication. I will check in with her via [employer] in 1 week to see how she is tolerating the medication. Follow up in clinic in 6 weeks.    - propranolol (INDERAL) 10 MG Tab; Take 1 Tablet by mouth 3 times a day as needed.  Dispense: 90 Tablet; Refill: 0  - sertraline (ZOLOFT) 50 MG Tab; Take 1 Tablet by mouth every day.  Dispense: 50 Tablet; Refill: 0     Any change or worsening of signs or symptoms, patient encouraged to follow-up or report to emergency room for further evaluation. Patient verbalizes understanding and agrees.    Follow-Up: Return if symptoms worsen or fail to improve.      PLEASE NOTE: This dictation was created using voice recognition software. I have made every reasonable attempt to correct obvious errors, but I expect that there are errors of grammar and possibly content that I did not discover before finalizing the note.

## 2021-09-01 NOTE — ASSESSMENT & PLAN NOTE
"Debi presents today for follow up. She states that she never did complete the abdominal ultrasound that was ordered 2 months ago as she felt her symptoms were improving. However, she does report noticeable \"digestive issues\", generalized abdominal and pelvic pain and infections that have occurred on her finger, eye and knee. She is concerned that the initial infection that she had experienced post operatively may have causes these issues in the long run. She denies any N/V, diarrhea, constipation, melena, hematochezia, SOB, chest pain or fevers/chills.  "

## 2021-09-08 ENCOUNTER — TELEPHONE (OUTPATIENT)
Dept: MEDICAL GROUP | Facility: MEDICAL CENTER | Age: 26
End: 2021-09-08

## 2021-09-08 DIAGNOSIS — F32.A ANXIETY AND DEPRESSION: ICD-10-CM

## 2021-09-08 DIAGNOSIS — R10.84 GENERALIZED ABDOMINAL PAIN: ICD-10-CM

## 2021-09-08 DIAGNOSIS — F41.9 ANXIETY AND DEPRESSION: ICD-10-CM

## 2021-09-08 RX ORDER — ESCITALOPRAM OXALATE 10 MG/1
10 TABLET ORAL DAILY
Qty: 50 TABLET | Refills: 0 | Status: SHIPPED | OUTPATIENT
Start: 2021-09-08 | End: 2021-11-30

## 2021-09-08 NOTE — TELEPHONE ENCOUNTER
Just spoke with the patient via telephone to check in and see how she is tolerating the Zoloft 50 mg and propranolol 10 mg as needed.  Patient reports that she ended up discontinuing the Zoloft within 2 days of taking the medication due to experiencing side effects.  She reports that her anxiety and depression seem to be heightened.  She is willing to try different medication.  Therefore, we will start her on Lexapro 10 mg once daily for the next week.  I will plan to call her in 1 week to see how she is tolerating the medication.  If she is tolerating it well we will increase her dosage to 20 mg daily.  She reports that she has not used the propranolol as needed just yet.  I will plan to check in with her in 1 week in regards to this medication as well.  All questions and concerns were answered during the telephone call.  Patient agrees to the plan moving forward.

## 2021-09-30 ENCOUNTER — HOSPITAL ENCOUNTER (OUTPATIENT)
Dept: RADIOLOGY | Facility: MEDICAL CENTER | Age: 26
End: 2021-09-30
Attending: PHYSICIAN ASSISTANT
Payer: MEDICAID

## 2021-09-30 DIAGNOSIS — R10.84 GENERALIZED ABDOMINAL PAIN: ICD-10-CM

## 2021-09-30 PROCEDURE — 76700 US EXAM ABDOM COMPLETE: CPT

## 2021-10-13 ENCOUNTER — OFFICE VISIT (OUTPATIENT)
Dept: MEDICAL GROUP | Facility: MEDICAL CENTER | Age: 26
End: 2021-10-13
Attending: PHYSICIAN ASSISTANT
Payer: MEDICAID

## 2021-10-13 VITALS
WEIGHT: 121.3 LBS | OXYGEN SATURATION: 97 % | BODY MASS INDEX: 20.71 KG/M2 | SYSTOLIC BLOOD PRESSURE: 100 MMHG | HEART RATE: 95 BPM | HEIGHT: 64 IN | DIASTOLIC BLOOD PRESSURE: 64 MMHG | TEMPERATURE: 98.3 F | RESPIRATION RATE: 17 BRPM

## 2021-10-13 DIAGNOSIS — F41.9 ANXIETY AND DEPRESSION: ICD-10-CM

## 2021-10-13 DIAGNOSIS — F32.A ANXIETY AND DEPRESSION: ICD-10-CM

## 2021-10-13 PROCEDURE — 99213 OFFICE O/P EST LOW 20 MIN: CPT | Performed by: PHYSICIAN ASSISTANT

## 2021-10-13 NOTE — ASSESSMENT & PLAN NOTE
Debi presents today for follow up. She reports that she discontinued taking the Zoloft as she did not feel that it was benefiting her anxiety and depression. In fact, she believes that she was doing better off the medication than on it. She has also been taking the propranolol daily instead of PRN as discussed. She states that she felt good on the 10 mg but did not tolerate 20 mg at one time. She continues in therapy every Thursday and reports that she has had great benefit from this. Denies SI or HI.

## 2021-10-13 NOTE — PROGRESS NOTES
Chief Complaint   Patient presents with   • Follow-Up     Subjective:     HPI:   Debi Juarez is a 26 y.o. female here to discuss the evaluation and management of:    Anxiety and depression  Debi presents today for follow up. She reports that she discontinued taking the Zoloft as she did not feel that it was benefiting her anxiety and depression. In fact, she believes that she was doing better off the medication than on it. She has also been taking the propranolol daily instead of PRN as discussed. She states that she felt good on the 10 mg but did not tolerate 20 mg at one time. She continues in therapy every Thursday and reports that she has had great benefit from this. Denies SI or HI.     ROS  See HPI.     No Known Allergies    Current medicines (including changes today)  Current Outpatient Medications   Medication Sig Dispense Refill   • escitalopram (LEXAPRO) 10 MG Tab Take 1 Tablet by mouth every day. 50 Tablet 0   • propranolol (INDERAL) 10 MG Tab Take 1 Tablet by mouth 3 times a day as needed. 90 Tablet 0   • ciprofloxacin (CIPRO) 500 MG Tab Take 1 tablet by mouth 2 times a day. 10 tablet 0   • ibuprofen (MOTRIN) 800 MG Tab Take 1 tablet by mouth every 8 hours as needed for Moderate Pain. 30 tablet 0   • Prenatal MV-Min-Fe Fum-FA-DHA (PRENATAL 1 PO) Take  by mouth.       No current facility-administered medications for this visit.       Social History     Tobacco Use   • Smoking status: Never Smoker   • Smokeless tobacco: Never Used   Vaping Use   • Vaping Use: Never used   Substance Use Topics   • Alcohol use: No   • Drug use: No       Patient Active Problem List    Diagnosis Date Noted   • Generalized abdominal pain 2021   • Skin lesion 2021   • Anxiety and depression 2021   • Ovarian cyst, left 2021   •  (vaginal birth after ) 2021   • Postpartum care and examination of lactating mother 2021       Family History   Problem Relation Age of  "Onset   • No Known Problems Mother    • No Known Problems Father    • No Known Problems Sister    • No Known Problems Brother    • Cancer Maternal Grandmother    • No Known Problems Maternal Grandfather    • Stroke Paternal Grandmother    • No Known Problems Paternal Grandfather         Objective:     /64 (BP Location: Left arm, Patient Position: Sitting, BP Cuff Size: Adult)   Pulse 95   Temp 36.8 °C (98.3 °F) (Skin)   Resp 17   Ht 1.626 m (5' 4\")   Wt 55 kg (121 lb 4.8 oz)   SpO2 97%  Body mass index is 20.82 kg/m².    Physical Exam:  Physical Exam  Vitals reviewed.   Constitutional:       Appearance: Normal appearance.   HENT:      Head: Normocephalic.      Right Ear: External ear normal.      Left Ear: External ear normal.   Cardiovascular:      Rate and Rhythm: Normal rate and regular rhythm.      Heart sounds: Normal heart sounds.   Pulmonary:      Breath sounds: Normal breath sounds.   Neurological:      General: No focal deficit present.      Mental Status: She is alert and oriented to person, place, and time.   Psychiatric:         Mood and Affect: Mood normal.         Behavior: Behavior normal.         Judgment: Judgment normal.       Assessment and Plan:     The following treatment plan was discussed:    1. Anxiety and depression  - This is a chronic condition.  - No SI or HI.   - Plan: Continue on propranolol but as needed vs daily. Continue in therapy weekly. Follow up in 4 weeks for med check.      Any change or worsening of signs or symptoms, patient encouraged to follow-up or report to emergency room for further evaluation. Patient verbalizes understanding and agrees.    Follow-Up: Return in about 4 weeks (around 11/10/2021) for Med check.      PLEASE NOTE: This dictation was created using voice recognition software. I have made every reasonable attempt to correct obvious errors, but I expect that there are errors of grammar and possibly content that I did not discover before finalizing " the note.

## 2021-11-30 ENCOUNTER — OFFICE VISIT (OUTPATIENT)
Dept: URGENT CARE | Facility: PHYSICIAN GROUP | Age: 26
End: 2021-11-30
Payer: MEDICAID

## 2021-11-30 ENCOUNTER — HOSPITAL ENCOUNTER (OUTPATIENT)
Facility: MEDICAL CENTER | Age: 26
End: 2021-11-30
Attending: PHYSICIAN ASSISTANT
Payer: MEDICAID

## 2021-11-30 VITALS
RESPIRATION RATE: 12 BRPM | DIASTOLIC BLOOD PRESSURE: 80 MMHG | WEIGHT: 117 LBS | TEMPERATURE: 98.1 F | BODY MASS INDEX: 19.97 KG/M2 | OXYGEN SATURATION: 97 % | SYSTOLIC BLOOD PRESSURE: 110 MMHG | HEIGHT: 64 IN | HEART RATE: 80 BPM

## 2021-11-30 DIAGNOSIS — R11.2 NAUSEA VOMITING AND DIARRHEA: ICD-10-CM

## 2021-11-30 DIAGNOSIS — R68.89 FLU-LIKE SYMPTOMS: ICD-10-CM

## 2021-11-30 DIAGNOSIS — R19.7 NAUSEA VOMITING AND DIARRHEA: ICD-10-CM

## 2021-11-30 DIAGNOSIS — R53.83 OTHER FATIGUE: ICD-10-CM

## 2021-11-30 PROCEDURE — U0003 INFECTIOUS AGENT DETECTION BY NUCLEIC ACID (DNA OR RNA); SEVERE ACUTE RESPIRATORY SYNDROME CORONAVIRUS 2 (SARS-COV-2) (CORONAVIRUS DISEASE [COVID-19]), AMPLIFIED PROBE TECHNIQUE, MAKING USE OF HIGH THROUGHPUT TECHNOLOGIES AS DESCRIBED BY CMS-2020-01-R: HCPCS

## 2021-11-30 PROCEDURE — U0005 INFEC AGEN DETEC AMPLI PROBE: HCPCS

## 2021-11-30 PROCEDURE — 99203 OFFICE O/P NEW LOW 30 MIN: CPT | Mod: CS | Performed by: PHYSICIAN ASSISTANT

## 2021-11-30 RX ORDER — ONDANSETRON 4 MG/1
4 TABLET, ORALLY DISINTEGRATING ORAL EVERY 8 HOURS PRN
Qty: 20 TABLET | Refills: 0 | Status: SHIPPED | OUTPATIENT
Start: 2021-11-30 | End: 2023-04-25

## 2021-11-30 NOTE — LETTER
November 30, 2021         Patient: Debi Juarez   YOB: 1995   Date of Visit: 11/30/2021           To Whom it May Concern:    Debi Juarez was seen in my clinic on 11/30/2021.     Patient will be tested for COVID and has been advised to quarantine until results are available.    If you have any questions or concerns, please don't hesitate to call.        Sincerely,           Agueda Stevenson P.A.-C.  Electronically Signed

## 2021-12-01 DIAGNOSIS — R53.83 OTHER FATIGUE: ICD-10-CM

## 2021-12-01 DIAGNOSIS — R68.89 FLU-LIKE SYMPTOMS: ICD-10-CM

## 2021-12-01 DIAGNOSIS — R11.2 NAUSEA VOMITING AND DIARRHEA: ICD-10-CM

## 2021-12-01 DIAGNOSIS — R19.7 NAUSEA VOMITING AND DIARRHEA: ICD-10-CM

## 2021-12-01 LAB
COVID ORDER STATUS COVID19: NORMAL
SARS-COV-2 RNA RESP QL NAA+PROBE: NOTDETECTED
SPECIMEN SOURCE: NORMAL

## 2021-12-01 NOTE — PROGRESS NOTES
Chief Complaint   Patient presents with   • Fatigue     hx of anemia        HISTORY OF PRESENT ILLNESS: Patient is a 26 y.o. female who presents today for the following:    Increase in fatigue x 2 weeks  Slight ST, nasal congestion, slight cough  Chills last night  No fever  H/o iron deficiency anemia  Loose stool the last 4 days  Vomiting 2 nights ago  No urinary symptoms    Patient Active Problem List    Diagnosis Date Noted   • Generalized abdominal pain 2021   • Skin lesion 2021   • Anxiety and depression 2021   • Ovarian cyst, left 2021   •  (vaginal birth after ) 2021   • Postpartum care and examination of lactating mother 2021     Allergies:Patient has no known allergies.    Current Outpatient Medications Ordered in Epic   Medication Sig Dispense Refill   • ondansetron (ZOFRAN ODT) 4 MG TABLET DISPERSIBLE Take 1 Tablet by mouth every 8 hours as needed for Nausea. 20 Tablet 0     No current Epic-ordered facility-administered medications on file.       Past Medical History:   Diagnosis Date   • Psychiatric problem 05/10/2021    Depression.       Social History     Tobacco Use   • Smoking status: Never Smoker   • Smokeless tobacco: Never Used   Vaping Use   • Vaping Use: Never used   Substance Use Topics   • Alcohol use: No   • Drug use: No       Family Status   Relation Name Status   • Mo  Alive   • Fa  Alive   • Sis 2 Alive   • Bro 1 Alive   • MGMo     • MGFa     • PGMo     • PGFa       Family History   Problem Relation Age of Onset   • No Known Problems Mother    • No Known Problems Father    • No Known Problems Sister    • No Known Problems Brother    • Cancer Maternal Grandmother    • No Known Problems Maternal Grandfather    • Stroke Paternal Grandmother    • No Known Problems Paternal Grandfather        Review of Systems:   Pertinent systems reviewed with the patient.    Exam:  /80   Pulse 80   Temp 36.7 °C (98.1 °F)  "(Temporal)   Resp 12   Ht 1.626 m (5' 4\")   Wt 53.1 kg (117 lb)   SpO2 97%   General: Well developed, well nourished. No distress.    Eye: PERRL/EOMI; conjunctivae clear, lids normal.  ENMT: Lips without lesions, MMM. Oropharynx is clear. Bilateral TMs are within normal limits.  Pulmonary: Unlabored respiratory effort. Lungs clear to auscultation, no wheezes, no rhonchi.    Cardiovascular: Regular rate and rhythm without murmur.   Abdomen: upper abdominal pain without guarding/rebound.   Neurologic: Grossly nonfocal. No facial asymmetry noted.  Lymph: No cervical lymphadenopathy noted.  Skin: Warm, dry, good turgor. No rashes in visible areas.   Psych: Normal mood. Alert and oriented to person, place and time.    Assessment/Plan:  Discussed likely viral etiology.  Vitals and exam are unremarkable.  Low suspicion for pneumonia. Patient will be tested for COVID and has been advised to quarantine until results are available.  Use all medication as directed.  Discussed appropriate over-the-counter symptomatic medication, and when to return to clinic. Follow up for worsening or persistent symptoms.  1. Flu-like symptoms  SARS-CoV-2, PCR (In-House): Collect NP OR nasal swab in VTM   2. Nausea vomiting and diarrhea  SARS-CoV-2, PCR (In-House): Collect NP OR nasal swab in VTM    ondansetron (ZOFRAN ODT) 4 MG TABLET DISPERSIBLE   3. Other fatigue  SARS-CoV-2, PCR (In-House): Collect NP OR nasal swab in VTM       "

## 2022-03-27 ENCOUNTER — HOSPITAL ENCOUNTER (EMERGENCY)
Facility: MEDICAL CENTER | Age: 27
End: 2022-03-28
Attending: STUDENT IN AN ORGANIZED HEALTH CARE EDUCATION/TRAINING PROGRAM
Payer: COMMERCIAL

## 2022-03-27 DIAGNOSIS — F41.9 ANXIETY: ICD-10-CM

## 2022-03-27 DIAGNOSIS — F41.0 PANIC ATTACK: Primary | ICD-10-CM

## 2022-03-27 PROCEDURE — 99283 EMERGENCY DEPT VISIT LOW MDM: CPT

## 2022-03-27 ASSESSMENT — ENCOUNTER SYMPTOMS
NAUSEA: 0
FEVER: 0
LOSS OF CONSCIOUSNESS: 0
CHILLS: 0
COUGH: 0
VOMITING: 0
NECK PAIN: 0
TINGLING: 1
SORE THROAT: 0
ABDOMINAL PAIN: 1
HEADACHES: 0
FALLS: 0
BLURRED VISION: 0
SHORTNESS OF BREATH: 1
DOUBLE VISION: 0

## 2022-03-27 ASSESSMENT — LIFESTYLE VARIABLES
AVERAGE NUMBER OF DAYS PER WEEK YOU HAVE A DRINK CONTAINING ALCOHOL: 0
HOW MANY TIMES IN THE PAST YEAR HAVE YOU HAD 5 OR MORE DRINKS IN A DAY: 0
CONSUMPTION TOTAL: NEGATIVE
HAVE PEOPLE ANNOYED YOU BY CRITICIZING YOUR DRINKING: NO
TOTAL SCORE: 0
DO YOU DRINK ALCOHOL: NO
EVER HAD A DRINK FIRST THING IN THE MORNING TO STEADY YOUR NERVES TO GET RID OF A HANGOVER: NO
TOTAL SCORE: 0
TOTAL SCORE: 0
ON A TYPICAL DAY WHEN YOU DRINK ALCOHOL HOW MANY DRINKS DO YOU HAVE: 0
EVER FELT BAD OR GUILTY ABOUT YOUR DRINKING: NO
DOES PATIENT WANT TO STOP DRINKING: NO
HAVE YOU EVER FELT YOU SHOULD CUT DOWN ON YOUR DRINKING: NO

## 2022-03-28 VITALS
DIASTOLIC BLOOD PRESSURE: 98 MMHG | TEMPERATURE: 97.5 F | HEIGHT: 64 IN | WEIGHT: 119.05 LBS | BODY MASS INDEX: 20.32 KG/M2 | SYSTOLIC BLOOD PRESSURE: 131 MMHG | HEART RATE: 85 BPM | RESPIRATION RATE: 18 BRPM | OXYGEN SATURATION: 97 %

## 2022-03-28 NOTE — ED TRIAGE NOTES
"Chief Complaint   Patient presents with   • Anxiety     Pt BIBA from home. EMS reports that pt has a restraining order on ex boyfriend and has court tomorrow. She had a phone call from ex tonight which caused her to have an anxiety attack. Pt has history of anxiety attacks. Denies si/hi.  Pt quiet and difficult to obtain answers from.  /92   Pulse 90   Temp 36.4 °C (97.5 °F) (Temporal)   Resp 16   Ht 1.626 m (5' 4\")   Wt 54 kg (119 lb 0.8 oz)   SpO2 95%   BMI 20.43 kg/m²     "

## 2022-03-28 NOTE — ED PROVIDER NOTES
ED Provider Note    Chief Complaint:   Anxiety    HPI:  Debi Juarez is a very pleasant 26-year-old female who presents with a multitude of social issues related to the father of her children.  Patient reports that she has been having frequent anxiety attacks as of late.  Patient reports that she has a restraining order on her children's father and this individual continues to violate the restraining order and comes to her house.  Patient reports that she attempted to come and  her children as per court order today and [place] from [place] but that she was unable to receive them due to refusal by the father.  Patient is also very anxious because she has a court date tomorrow.  Patient reports that when she has acute anxiety attack she has headache, chest pain, shortness of breath, paresthesias, abdominal pain.  Patient reports these been ongoing since Friday.  Patient does report that they have improved since she is coming to the emergency department.  Patient reports that all the symptoms are consistent with previous episodes of acute panic attacks.    Review of Systems:  Review of Systems   Constitutional: Negative for chills and fever.   HENT: Negative for congestion and sore throat.    Eyes: Negative for blurred vision and double vision.   Respiratory: Positive for shortness of breath. Negative for cough.    Cardiovascular: Positive for chest pain. Negative for leg swelling.   Gastrointestinal: Positive for abdominal pain. Negative for nausea and vomiting.   Genitourinary: Negative for dysuria and hematuria.   Musculoskeletal: Negative for falls and neck pain.   Skin: Negative for itching and rash.   Neurological: Positive for tingling. Negative for loss of consciousness and headaches.       Past Medical History:   has a past medical history of Psychiatric problem (05/10/2021).    Social History:  Social History     Tobacco Use   • Smoking status: Never Smoker   • Smokeless tobacco: Never Used  "  Vaping Use   • Vaping Use: Never used   Substance and Sexual Activity   • Alcohol use: No   • Drug use: No   • Sexual activity: Not Currently     Partners: Male     Comment: None        Surgical History:   has a past surgical history that includes cervical conization (N/A, 6/29/2018); primary c section (N/A, 3/8/2018); lap,diagnostic abdomen (5/12/2021); salpingectomy (Bilateral, 5/12/2021); and ovarian cystectomy (Left, 5/12/2021).    Allergies:  No Known Allergies    Physical Exam:  Vital Signs: /98   Pulse 85   Temp 36.4 °C (97.5 °F)   Resp 18   Ht 1.626 m (5' 4\")   Wt 54 kg (119 lb 0.8 oz)   SpO2 97%   BMI 20.43 kg/m²   Physical Exam  Vitals and nursing note reviewed.   Constitutional:       Comments: Patient is lying in bed supine, pleasant, conversant, speaking in complete sentences   HENT:      Head: Normocephalic and atraumatic.   Eyes:      Extraocular Movements: Extraocular movements intact.      Conjunctiva/sclera: Conjunctivae normal.      Pupils: Pupils are equal, round, and reactive to light.   Cardiovascular:      Comments: HR 90  Pulmonary:      Effort: Pulmonary effort is normal. No respiratory distress.   Musculoskeletal:         General: No swelling. Normal range of motion.      Cervical back: Normal range of motion. No rigidity.   Neurological:      Mental Status: She is alert.         Labs:  Labs Reviewed - No data to display    Radiology:  No orders to display        MDM:  Patient has deferred lab work, imaging, full exam and would prefer just to wait in the hospital bed and have some food and water and regroup.  I believe this is appropriate and have offered the emergency department services should she change her mind and receive a full evaluation.  Patient symptoms are consistent with acute panic disorder.  Disposition pending observation.    Electronically signed by: Demarcus Pollard M.D., 3/27/2022, 11:03 PM    Patient reports she feels better at this time, no longer " anxious.  Patient has a safe place to go, will be picked up by her family member.  I expressed to the patient that she always has a safe place to go if she is concerned about her safety and can always come back to the emergency department if she needs help.    Repeat physical exam benign.  I doubt any serious emergency process at this time.  Patient and/or family, friends given strict return precautions and care instructions. They have demonstrated understanding of discharge instructions through teach back mechanism. Advised PCP follow-up in 1-2 days.  Patient/family/friend expresses understanding and agrees to plan.    This dictation has been created using voice recognition software. I am continuously working with the software to minimize the number of voice recognition errors and I have made every attempt to manually correct the errors within my dictation. However errors  related to this voice recognition software may still exist and should be interpreted within the appropriate context.     Electronically signed by: Demarcus Pollard M.D., 3/28/2022 1:26 AM      Disposition:  Home    Final Impression:  1. Panic attack    2. Anxiety

## 2023-01-28 ENCOUNTER — OFFICE VISIT (OUTPATIENT)
Dept: URGENT CARE | Facility: CLINIC | Age: 28
End: 2023-01-28
Payer: MEDICAID

## 2023-01-28 VITALS
SYSTOLIC BLOOD PRESSURE: 114 MMHG | OXYGEN SATURATION: 100 % | DIASTOLIC BLOOD PRESSURE: 76 MMHG | TEMPERATURE: 99.2 F | BODY MASS INDEX: 22.52 KG/M2 | HEART RATE: 70 BPM | HEIGHT: 64 IN | RESPIRATION RATE: 18 BRPM | WEIGHT: 131.9 LBS

## 2023-01-28 DIAGNOSIS — K12.0 APHTHOUS ULCER: ICD-10-CM

## 2023-01-28 PROCEDURE — 99213 OFFICE O/P EST LOW 20 MIN: CPT | Performed by: PHYSICIAN ASSISTANT

## 2023-01-28 RX ORDER — LIDOCAINE HYDROCHLORIDE 20 MG/ML
5 SOLUTION OROPHARYNGEAL
Qty: 100 ML | Refills: 0 | Status: SHIPPED | OUTPATIENT
Start: 2023-01-28 | End: 2023-02-02

## 2023-01-28 ASSESSMENT — ENCOUNTER SYMPTOMS
SORE THROAT: 0
CHILLS: 0
MYALGIAS: 0
FEVER: 0
SINUS PAIN: 0
DIZZINESS: 0
HEADACHES: 0
NAUSEA: 0
ABDOMINAL PAIN: 0
VOMITING: 0

## 2023-01-29 NOTE — PROGRESS NOTES
Subjective:     CHIEF COMPLAINT  Chief Complaint   Patient presents with    Oral Swelling     Right upper/lower dental pain/swollen/x3days       HPI  Debi Juarez is a very pleasant 27 y.o. female who presents to the clinic with pain and swelling to her right bottom gingiva x3 days.  Describes tenderness to any palpation.  Also has sensitivity when eating and drinking.  Denies any hot or cold dental sensitivity.  No facial swelling.  No fevers, chills, nausea or vomiting.  No prior history of dental abscess.  No OTC medications have been started at this time.    REVIEW OF SYSTEMS  Review of Systems   Constitutional:  Negative for chills, fever and malaise/fatigue.   HENT:  Negative for congestion, ear pain, sinus pain and sore throat.         Oral pain   Gastrointestinal:  Negative for abdominal pain, nausea and vomiting.   Musculoskeletal:  Negative for myalgias.   Skin:  Negative for rash.   Neurological:  Negative for dizziness and headaches.     PAST MEDICAL HISTORY  Patient Active Problem List    Diagnosis Date Noted    Generalized abdominal pain 2021    Skin lesion 2021    Anxiety and depression 2021    Ovarian cyst, left 2021     (vaginal birth after ) 2021    Postpartum care and examination of lactating mother 2021       SURGICAL HISTORY   has a past surgical history that includes cervical conization (N/A, 2018); primary c section (N/A, 3/8/2018); lap,diagnostic abdomen (2021); salpingectomy (Bilateral, 2021); and ovarian cystectomy (Left, 2021).    ALLERGIES  No Known Allergies    CURRENT MEDICATIONS  Home Medications       Reviewed by Bull Herrera P.A.-C. (Physician Assistant) on 23 at 1946  Med List Status: <None>     Medication Last Dose Status   ondansetron (ZOFRAN ODT) 4 MG TABLET DISPERSIBLE Not Taking Active                    SOCIAL HISTORY  Social History     Tobacco Use    Smoking status: Never    Smokeless  "tobacco: Never   Vaping Use    Vaping Use: Never used   Substance and Sexual Activity    Alcohol use: No    Drug use: No    Sexual activity: Not Currently     Partners: Male     Comment: None        FAMILY HISTORY  Family History   Problem Relation Age of Onset    No Known Problems Mother     No Known Problems Father     No Known Problems Sister     No Known Problems Brother     Cancer Maternal Grandmother     No Known Problems Maternal Grandfather     Stroke Paternal Grandmother     No Known Problems Paternal Grandfather           Objective:     VITAL SIGNS: /76 (BP Location: Left arm, Patient Position: Sitting)   Pulse 70   Temp 37.3 °C (99.2 °F) (Temporal)   Resp 18   Ht 1.626 m (5' 4\")   Wt 59.8 kg (131 lb 14.4 oz)   LMP 01/12/2023 (Exact Date)   SpO2 100%   BMI 22.64 kg/m²     PHYSICAL EXAM  Physical Exam  Constitutional:       General: She is not in acute distress.     Appearance: Normal appearance. She is not ill-appearing, toxic-appearing or diaphoretic.   HENT:      Head: Normocephalic and atraumatic.      Comments: No swelling of the salivary glands.     Right Ear: Tympanic membrane, ear canal and external ear normal.      Left Ear: Tympanic membrane, ear canal and external ear normal.      Nose: Nose normal. No congestion or rhinorrhea.      Mouth/Throat:      Mouth: Mucous membranes are moist.      Pharynx: No oropharyngeal exudate or posterior oropharyngeal erythema.      Comments: Aphthous ulcer to the right bottom gingiva.  No dental abscess.  No dental tenderness or caries present.  Eyes:      Conjunctiva/sclera: Conjunctivae normal.   Cardiovascular:      Rate and Rhythm: Normal rate and regular rhythm.      Pulses: Normal pulses.      Heart sounds: Normal heart sounds.   Pulmonary:      Effort: Pulmonary effort is normal.      Breath sounds: Normal breath sounds. No wheezing.   Musculoskeletal:      Cervical back: Normal range of motion. No muscular tenderness.   Lymphadenopathy:      " Cervical: No cervical adenopathy.   Skin:     General: Skin is warm and dry.      Capillary Refill: Capillary refill takes less than 2 seconds.   Neurological:      Mental Status: She is alert.   Psychiatric:         Mood and Affect: Mood normal.         Thought Content: Thought content normal.       Assessment/Plan:     1. Aphthous ulcer  - lidocaine (XYLOCAINE) 2 % Solution; Take 5 mL by mouth every 3 hours as needed for Throat/Mouth Pain for up to 5 days.  Dispense: 100 mL; Refill: 0  - Referral to establish with [facility] PCP      MDM/Comments:    Discussed viral etiology of aphthous ulcer.  Tylenol/ibuprofen for pain.  Viscous lidocaine if needed for numbing.  Return precautions for any change or worsening symptoms.    Differential diagnosis, natural history, supportive care, and indications for immediate follow-up discussed. All questions answered. Patient agrees with the plan of care.    Follow-up as needed if symptoms worsen or fail to improve to PCP, Urgent care or Emergency Room.    I have personally reviewed prior external notes and test results pertinent to today's visit.  I have independently reviewed and interpreted all diagnostics ordered during this urgent care acute visit.   Discussed management options (risks,benefits, and alternatives to treatment). Pt expresses understanding and the treatment plan was agreed upon. Questions were encouraged and answered to pt's satisfaction.    Please note that this dictation was created using voice recognition software. I have made a reasonable attempt to correct obvious errors, but I expect that there are errors of grammar and possibly content that I did not discover before finalizing the note.

## 2023-01-31 ENCOUNTER — TELEPHONE (OUTPATIENT)
Dept: HEALTH INFORMATION MANAGEMENT | Facility: OTHER | Age: 28
End: 2023-01-31

## 2023-04-25 ENCOUNTER — OFFICE VISIT (OUTPATIENT)
Dept: MEDICAL GROUP | Facility: MEDICAL CENTER | Age: 28
End: 2023-04-25
Attending: PHYSICIAN ASSISTANT
Payer: MEDICAID

## 2023-04-25 VITALS
SYSTOLIC BLOOD PRESSURE: 100 MMHG | OXYGEN SATURATION: 97 % | HEIGHT: 66 IN | RESPIRATION RATE: 18 BRPM | WEIGHT: 126.2 LBS | HEART RATE: 99 BPM | BODY MASS INDEX: 20.28 KG/M2 | TEMPERATURE: 97.3 F | DIASTOLIC BLOOD PRESSURE: 62 MMHG

## 2023-04-25 DIAGNOSIS — Z13.21 SCREENING FOR ENDOCRINE, NUTRITIONAL, METABOLIC AND IMMUNITY DISORDER: ICD-10-CM

## 2023-04-25 DIAGNOSIS — Z13.29 SCREENING FOR ENDOCRINE, NUTRITIONAL, METABOLIC AND IMMUNITY DISORDER: ICD-10-CM

## 2023-04-25 DIAGNOSIS — Z11.3 ROUTINE SCREENING FOR STI (SEXUALLY TRANSMITTED INFECTION): ICD-10-CM

## 2023-04-25 DIAGNOSIS — Z11.59 NEED FOR HEPATITIS C SCREENING TEST: ICD-10-CM

## 2023-04-25 DIAGNOSIS — Z13.0 SCREENING FOR ENDOCRINE, NUTRITIONAL, METABOLIC AND IMMUNITY DISORDER: ICD-10-CM

## 2023-04-25 DIAGNOSIS — Z76.89 ENCOUNTER TO ESTABLISH CARE: ICD-10-CM

## 2023-04-25 DIAGNOSIS — Z13.228 SCREENING FOR ENDOCRINE, NUTRITIONAL, METABOLIC AND IMMUNITY DISORDER: ICD-10-CM

## 2023-04-25 PROCEDURE — 99213 OFFICE O/P EST LOW 20 MIN: CPT | Performed by: NURSE PRACTITIONER

## 2023-04-25 PROCEDURE — G0439 PPPS, SUBSEQ VISIT: HCPCS | Performed by: NURSE PRACTITIONER

## 2023-04-25 ASSESSMENT — PATIENT HEALTH QUESTIONNAIRE - PHQ9: CLINICAL INTERPRETATION OF PHQ2 SCORE: 0

## 2023-04-25 NOTE — PATIENT INSTRUCTIONS
[facility] Reproductive medicine   Phone: [phone]  Fax: [phone]    [facility] REPRODUCTIVE MEDIC[facility]  [address], [address]  [place] [address]    Hours of Operation:  Monday-Friday 7:00AM-4:00PM  Closed 12:00N-1:00PM

## 2023-04-26 PROBLEM — Z76.89 ENCOUNTER TO ESTABLISH CARE: Status: ACTIVE | Noted: 2023-04-26

## 2023-04-26 NOTE — PROGRESS NOTES
"No chief complaint on file.      Subjective:     HPI:   Debi Juarez is a 27 y.o. female here to discuss the evaluation and management of:        Problem   Encounter to Establish Care    Patient here to establish care.  Patient has no significant concerns today other than she would like to know if she actually had her tubes tied during her surgery for her cyst.         ROS  See HPI       No Known Allergies    Current medicines (including changes today)  No current outpatient medications on file.     No current facility-administered medications for this visit.       Social History     Tobacco Use    Smoking status: Never    Smokeless tobacco: Never   Vaping Use    Vaping Use: Never used   Substance Use Topics    Alcohol use: No    Drug use: No       Patient Active Problem List    Diagnosis Date Noted    Encounter to establish care 2023    Generalized abdominal pain 2021    Skin lesion 2021    Anxiety and depression 2021    Ovarian cyst, left 2021     (vaginal birth after ) 2021    Postpartum care and examination of lactating mother 2021       Family History   Problem Relation Age of Onset    No Known Problems Mother     No Known Problems Father     No Known Problems Sister     No Known Problems Brother     Cancer Maternal Grandmother     No Known Problems Maternal Grandfather     Stroke Paternal Grandmother     No Known Problems Paternal Grandfather           Objective:     /62 (BP Location: Right arm, Patient Position: Sitting, BP Cuff Size: Adult)   Pulse 99   Temp 36.3 °C (97.3 °F) (Temporal)   Resp 18   Ht 1.676 m (5' 6\")   Wt 57.2 kg (126 lb 3.2 oz)   SpO2 97%  Body mass index is 20.37 kg/m².    Physical Exam:  Physical Exam  Vitals reviewed.   Constitutional:       General: She is awake.      Appearance: Normal appearance. She is well-developed.   HENT:      Head: Normocephalic.   Eyes:      Conjunctiva/sclera: Conjunctivae normal. "   Cardiovascular:      Rate and Rhythm: Normal rate and regular rhythm.   Pulmonary:      Effort: Pulmonary effort is normal. No respiratory distress.      Breath sounds: Normal breath sounds.   Musculoskeletal:      Cervical back: Neck supple. No tenderness.   Lymphadenopathy:      Cervical: No cervical adenopathy.   Skin:     General: Skin is warm and dry.   Neurological:      Mental Status: She is alert and oriented to person, place, and time.   Psychiatric:         Mood and Affect: Mood normal.         Behavior: Behavior normal. Behavior is cooperative.       Assessment and Plan:     The following treatment plan was discussed:    Problem List Items Addressed This Visit       Encounter to establish care     Discussed health history and maintenance   Flu vaccine - Not available   Colon Ca screening - Not applicable   Mammogram- Not Applicable   Pap smear - Done within last 3 years   STD Screening- Ordered  Preventative screening labs ordered -she will follow-up with me for any abnormal results  Confirm that during her last surgery her tubes were indeed tied and salpingectomy was performed, patient was concerned with this and is now reconsidering possibly having another child.  We will have patient follow back up with her OB/GYN, also instructed patient she could possibly talk to fertility medicine and gave information for their office.            Other Visit Diagnoses       Screening for endocrine, nutritional, metabolic and immunity disorder        Relevant Orders    HEMOGLOBIN A1C    Lipid Profile    VITAMIN D,25 HYDROXY (DEFICIENCY)    TSH    FREE THYROXINE    Comp Metabolic Panel    Routine screening for STI (sexually transmitted infection)        Relevant Orders    T.PALLIDUM AB CATALINA (SCREENING)    HIV AG/AB COMBO ASSAY SCREENING    Need for hepatitis C screening test        Relevant Orders    HEP C VIRUS ANTIBODY            Any change or worsening of signs or symptoms, patient encouraged to follow-up or  report to emergency room for further evaluation. Patient verbalizes understanding and agrees.    Follow-Up: Follow-up as needed after labs are completed      PLEASE NOTE: This dictation was created using voice recognition software. I have made every reasonable attempt to correct obvious errors, but I expect that there are errors of grammar and possibly content that I did not discover before finalizing the note.

## 2023-04-26 NOTE — ASSESSMENT & PLAN NOTE
Discussed health history and maintenance   Flu vaccine - Not available   Colon Ca screening - Not applicable   Mammogram- Not Applicable   Pap smear - Done within last 3 years   STD Screening- Ordered  Preventative screening labs ordered -she will follow-up with me for any abnormal results  Confirm that during her last surgery her tubes were indeed tied and salpingectomy was performed, patient was concerned with this and is now reconsidering possibly having another child.  We will have patient follow back up with her OB/GYN, also instructed patient she could possibly talk to fertility medicine and gave information for their office.

## 2023-09-27 ENCOUNTER — HOSPITAL ENCOUNTER (EMERGENCY)
Facility: MEDICAL CENTER | Age: 28
End: 2023-09-27
Attending: EMERGENCY MEDICINE
Payer: MEDICAID

## 2023-09-27 VITALS
WEIGHT: 124.56 LBS | HEIGHT: 64 IN | TEMPERATURE: 98.2 F | HEART RATE: 63 BPM | DIASTOLIC BLOOD PRESSURE: 81 MMHG | BODY MASS INDEX: 21.27 KG/M2 | OXYGEN SATURATION: 99 % | SYSTOLIC BLOOD PRESSURE: 136 MMHG | RESPIRATION RATE: 18 BRPM

## 2023-09-27 DIAGNOSIS — R19.7 DIARRHEA, UNSPECIFIED TYPE: ICD-10-CM

## 2023-09-27 DIAGNOSIS — R10.84 GENERALIZED ABDOMINAL PAIN: ICD-10-CM

## 2023-09-27 LAB
ALBUMIN SERPL BCP-MCNC: 4.7 G/DL (ref 3.2–4.9)
ALBUMIN/GLOB SERPL: 1.7 G/DL
ALP SERPL-CCNC: 60 U/L (ref 30–99)
ALT SERPL-CCNC: 10 U/L (ref 2–50)
ANION GAP SERPL CALC-SCNC: 10 MMOL/L (ref 7–16)
APPEARANCE UR: CLEAR
AST SERPL-CCNC: 13 U/L (ref 12–45)
BACTERIA #/AREA URNS HPF: NEGATIVE /HPF
BASOPHILS # BLD AUTO: 0.3 % (ref 0–1.8)
BASOPHILS # BLD: 0.02 K/UL (ref 0–0.12)
BILIRUB SERPL-MCNC: 0.2 MG/DL (ref 0.1–1.5)
BILIRUB UR QL STRIP.AUTO: NEGATIVE
BUN SERPL-MCNC: 10 MG/DL (ref 8–22)
CALCIUM ALBUM COR SERPL-MCNC: 8.7 MG/DL (ref 8.5–10.5)
CALCIUM SERPL-MCNC: 9.3 MG/DL (ref 8.5–10.5)
CHLORIDE SERPL-SCNC: 105 MMOL/L (ref 96–112)
CO2 SERPL-SCNC: 25 MMOL/L (ref 20–33)
COLOR UR: YELLOW
CREAT SERPL-MCNC: 0.67 MG/DL (ref 0.5–1.4)
EOSINOPHIL # BLD AUTO: 0.21 K/UL (ref 0–0.51)
EOSINOPHIL NFR BLD: 2.9 % (ref 0–6.9)
EPI CELLS #/AREA URNS HPF: ABNORMAL /HPF
ERYTHROCYTE [DISTWIDTH] IN BLOOD BY AUTOMATED COUNT: 53.4 FL (ref 35.9–50)
GFR SERPLBLD CREATININE-BSD FMLA CKD-EPI: 122 ML/MIN/1.73 M 2
GLOBULIN SER CALC-MCNC: 2.8 G/DL (ref 1.9–3.5)
GLUCOSE SERPL-MCNC: 88 MG/DL (ref 65–99)
GLUCOSE UR STRIP.AUTO-MCNC: NEGATIVE MG/DL
HCG SERPL QL: NEGATIVE
HCT VFR BLD AUTO: 36.6 % (ref 37–47)
HGB BLD-MCNC: 11.9 G/DL (ref 12–16)
IMM GRANULOCYTES # BLD AUTO: 0.02 K/UL (ref 0–0.11)
IMM GRANULOCYTES NFR BLD AUTO: 0.3 % (ref 0–0.9)
KETONES UR STRIP.AUTO-MCNC: NEGATIVE MG/DL
LEUKOCYTE ESTERASE UR QL STRIP.AUTO: NEGATIVE
LIPASE SERPL-CCNC: 89 U/L (ref 11–82)
LYMPHOCYTES # BLD AUTO: 2.02 K/UL (ref 1–4.8)
LYMPHOCYTES NFR BLD: 27.7 % (ref 22–41)
MCH RBC QN AUTO: 27.9 PG (ref 27–33)
MCHC RBC AUTO-ENTMCNC: 32.5 G/DL (ref 32.2–35.5)
MCV RBC AUTO: 85.9 FL (ref 81.4–97.8)
MICRO URNS: ABNORMAL
MONOCYTES # BLD AUTO: 0.43 K/UL (ref 0–0.85)
MONOCYTES NFR BLD AUTO: 5.9 % (ref 0–13.4)
NEUTROPHILS # BLD AUTO: 4.59 K/UL (ref 1.82–7.42)
NEUTROPHILS NFR BLD: 62.9 % (ref 44–72)
NITRITE UR QL STRIP.AUTO: NEGATIVE
NRBC # BLD AUTO: 0 K/UL
NRBC BLD-RTO: 0 /100 WBC (ref 0–0.2)
PH UR STRIP.AUTO: 5.5 [PH] (ref 5–8)
PLATELET # BLD AUTO: 262 K/UL (ref 164–446)
PMV BLD AUTO: 11.2 FL (ref 9–12.9)
POTASSIUM SERPL-SCNC: 4.3 MMOL/L (ref 3.6–5.5)
PROT SERPL-MCNC: 7.5 G/DL (ref 6–8.2)
PROT UR QL STRIP: NEGATIVE MG/DL
RBC # BLD AUTO: 4.26 M/UL (ref 4.2–5.4)
RBC # URNS HPF: ABNORMAL /HPF
RBC UR QL AUTO: ABNORMAL
SODIUM SERPL-SCNC: 140 MMOL/L (ref 135–145)
SP GR UR STRIP.AUTO: 1.01
UROBILINOGEN UR STRIP.AUTO-MCNC: 0.2 MG/DL
WBC # BLD AUTO: 7.3 K/UL (ref 4.8–10.8)
WBC #/AREA URNS HPF: ABNORMAL /HPF

## 2023-09-27 PROCEDURE — 83690 ASSAY OF LIPASE: CPT

## 2023-09-27 PROCEDURE — 99284 EMERGENCY DEPT VISIT MOD MDM: CPT

## 2023-09-27 PROCEDURE — 81001 URINALYSIS AUTO W/SCOPE: CPT

## 2023-09-27 PROCEDURE — 36415 COLL VENOUS BLD VENIPUNCTURE: CPT

## 2023-09-27 PROCEDURE — 84703 CHORIONIC GONADOTROPIN ASSAY: CPT

## 2023-09-27 PROCEDURE — 80053 COMPREHEN METABOLIC PANEL: CPT

## 2023-09-27 PROCEDURE — 85025 COMPLETE CBC W/AUTO DIFF WBC: CPT

## 2023-09-27 NOTE — DISCHARGE INSTRUCTIONS
You were seen emerged part for diarrhea and intermittent abdominal pain.  This is most likely from a virus and should improve over time.  Please do plenty of fluids to ensure you do not get dehydrated.    The pain may come and go as expected with your diarrhea.    For pain you can take acetaminophen (Tylenol), 1000mg every 8 hours as needed for pain. Do not take more than 3000mg of acetaminophen in any 24 hour period. You can also take  ibuprofen (Motrin), 600mg every 6 hours as needed for pain (take with food to avoid GI upset).  Taking these medications regularly during the day can be very effective in controlling pain.    For short periods of time you may take Imodium, however we do not recommend using this continuously    Return to the emergency department or seek medical attention if you develop:  Uncontrollable vomiting, fevers, continuous worsening pain, any other new or concerning findings

## 2023-09-27 NOTE — ED TRIAGE NOTES
Chief Complaint   Patient presents with    Abdominal Pain     Since 2 days associated with diarrhea    No nausea, vomiting, fever     Pain: 0/10    Pt came in to triage ambulatory with steady gait for the above complaints.     Pt is alert and oriented x 4, speaking in full sentences, follows commands and responds appropriately to questions.     Respirations are even and unlabored.    Pt placed in lobby. Pt educated on triage process.     Pt encouraged to inform staff for any changes in condition or if needs help while waiting to be room in.    Vitals:    09/27/23 0053   BP: 125/75   Pulse: 65   Resp: 15   Temp: 36.7 °C (98.1 °F)   SpO2: 100%

## 2023-09-27 NOTE — ED NOTES
Pt discharged to home. Discharge paperwork provided. Education provided by ERP. Reinforced discharge instructions.  Pt was given follow up instructions  Pt verbalized understanding of all instructions for discharge.   Patient went out of the ER ambulatory with steady gait., alert and oriented x 4, with all belongings.

## 2023-09-27 NOTE — ED PROVIDER NOTES
"  ER Provider Note    Scribed for Rigoberto Bautista M.d. by Lee Correa. 9/27/2023  1:38 AM    Primary Care Provider: JIMMY Lobo    CHIEF COMPLAINT  Chief Complaint   Patient presents with    Abdominal Pain     Since 2 days associated with diarrhea    No nausea, vomiting, fever     EXTERNAL RECORDS REVIEWED  Outpatient Notes The patient's last o OP visit was 4/25 to establish care. Hx of ovarian cyst    HPI/ROS  LIMITATION TO HISTORY   Select: : None  OUTSIDE HISTORIAN(S):  Significant other Present at bedside.    Debi Juarez is a 28 y.o. female who presents to the ED for evaluation of abdominal pain onset 2 days ago. The patient reports her abdominal pain with associated diarrhea. The patient reports her diarrhea is stringy and yellow. She states her abdominal pain comes and goes.The patient reports an increase in pain before having bowel movements. The patient locates her pain generally to the abdomen. The patient states her mouth currently feels \"a little\" dry. Family reports the patient is concerned about eating and drinking due to diarrhea. She reports decreased appetite since symptom onset. The patient denies recent travel, recent antibiotics, vomiting, blood in urine, dysuria, abnormal vaginal discharge.     PAST MEDICAL HISTORY  Past Medical History:   Diagnosis Date    Psychiatric problem 05/10/2021    Depression.    Pulmonary emphysema (HCC)        SURGICAL HISTORY  Past Surgical History:   Procedure Laterality Date    CA LAP,DIAGNOSTIC ABDOMEN  5/12/2021    Procedure: PELVISCOPY, LYSIS OF ADHESIONS;  Surgeon: Delisa Payton M.D.;  Location: SURGERY SAME DAY [facility];  Service: Obstetrics    SALPINGECTOMY Bilateral 5/12/2021    Procedure: SALPINGECTOMY;  Surgeon: Delisa Payton M.D.;  Location: SURGERY SAME DAY [facility];  Service: Obstetrics    OVARIAN CYSTECTOMY Left 5/12/2021    Procedure: OVARIAN CYSTECTOMY;  Surgeon: Delisa Payton M.D.;  Location: SURGERY SAME DAY [facility];  " "Service: Obstetrics    CERVICAL CONIZATION N/A 6/29/2018    Procedure: CERVICAL CONIZATION- COLD KNIFE;  Surgeon: Rustam Hill M.D.;  Location: SURGERY SAME DAY [facility];  Service: Gynecology    PRIMARY C SECTION N/A 3/8/2018    Procedure: PRIMARY C SECTION;  Surgeon: Rustam Hill M.D.;  Location: LABOR AND DELIVERY;  Service: Labor and Delivery       FAMILY HISTORY  Family History   Problem Relation Age of Onset    No Known Problems Mother     No Known Problems Father     No Known Problems Sister     No Known Problems Brother     Cancer Maternal Grandmother     No Known Problems Maternal Grandfather     Stroke Paternal Grandmother     No Known Problems Paternal Grandfather        SOCIAL HISTORY   reports that she has never smoked. She has never used smokeless tobacco. She reports that she does not drink alcohol and does not use drugs.    CURRENT MEDICATIONS  There are no discharge medications for this patient.      ALLERGIES  Patient has no known allergies.    PHYSICAL EXAM  /75   Pulse 65   Temp 36.7 °C (98.1 °F) (Temporal)   Resp 15   Ht 1.626 m (5' 4\")   Wt 56.5 kg (124 lb 9 oz)   SpO2 100%   BMI 21.38 kg/m²   Gen: Alert, no acute distress  HEENT: Moist mucous membranes, ATNC  Neck: trachea midline  Resp: no respiratory distress  CV: No JVD, RRR  Abd: Diffuse abdominal tenderness with no rebound or guarding, non-distended  Back: No CVA tenderness  Ext: No deformities  Psych: normal mood  Neuro: speech fluent      DIAGNOSTIC STUDIES    Labs:   Labs Reviewed   CBC WITH DIFFERENTIAL - Abnormal; Notable for the following components:       Result Value    Hemoglobin 11.9 (*)     Hematocrit 36.6 (*)     RDW 53.4 (*)     All other components within normal limits   LIPASE - Abnormal; Notable for the following components:    Lipase 89 (*)     All other components within normal limits   URINALYSIS,CULTURE IF INDICATED - Abnormal; Notable for the following components:    Occult Blood Trace (*)     " All other components within normal limits    Narrative:     Indication for culture:->Patient WITHOUT an indwelling Urias  catheter in place with new onset of Dysuria, Frequency,  Urgency, and/or Suprapubic pain   URINE MICROSCOPIC (W/UA) - Abnormal; Notable for the following components:    Epithelial Cells Many (*)     All other components within normal limits    Narrative:     Indication for culture:->Patient WITHOUT an indwelling Urias  catheter in place with new onset of Dysuria, Frequency,  Urgency, and/or Suprapubic pain   COMP METABOLIC PANEL   HCG QUAL SERUM   ESTIMATED GFR     COURSE & MEDICAL DECISION MAKING     ED Observation Status? No; Patient does not meet criteria for ED Observation.     INITIAL ASSESSMENT, COURSE AND PLAN  Care Narrative: Patient presents with intermittent abdominal pain, alleviated by passage of diarrhea.  No vomiting.  Labs demonstrate no leukocytosis, no recent antibiotics pressure, low suspicion for C. difficile.  No recent foreign travel to suggest traveler's diarrhea.  No recent camping to suggest Giardia.  No bloody stools to suggest ETEC or other dysentery.  Lipase minimally elevated but no signs of pancreatitis.  Urinalysis negative for urinary tract infection.  Likely viral etiology with expectant management        1:41 AM - Patient is a 28 year old female who presents today for evaluation of abdominal pain onset 2 days ago. They have been experiencing associated diarrhea, but denies any vomiting. See HPI for further details. Patient seen and examined at bedside. Discussed plan of care, including discharge pending lab work to evaluate patient symptoms. Patient agrees to the plan of care.       2:29 AM - Patient was reevaluated at bedside. Discussed lab and radiology results with the patient and informed them of no elevation in WBC, slight elevation in lipase, and epithelial cells found in urine that I do not believe are cause for immediate concern. I then informed the  patient of my plan for discharge, which includes strict return precautions for any new or worsening symptoms. Patient understands and verbalizes agreement to plan of care. Patient is comfortable going home at this time.          DISPOSITION AND DISCUSSIONS  I have discussed management of the patient with the following physicians and JONATHAN's:  None    Discussion of management with other [facility] or appropriate source(s): None     Escalation of care considered, and ultimately not performed: the patient was evaluated by myself, after discussion I have recommended the patient to be discharged.    Barriers to care at this time, including but not limited to:  None noted at this time .     Decision tools and prescription drugs considered including, but not limited to: Antibiotics not currently indicated .    The patient will return for new or worsening symptoms and is stable at the time of discharge.    The patient is referred to a primary physician for blood pressure management, diabetic screening, and for all other preventative health concerns.    DISPOSITION:  Patient will be discharged home in stable condition.    FOLLOW UP:  Diane Marcos A.P.R.NLisa  [address] [id number] [phone]    Schedule an appointment as soon as possible for a visit       [facility], Emergency Dept  [address] [address] [phone]    If symptoms worsen      FINAL DIANGOSIS  1. Diarrhea, unspecified type    2. Generalized abdominal pain          Lee ZEPEDA (Scribjackelyn), am scribing for, and in the presence of, Rigoberto Bautista M.D..    Electronically signed by: Lee Correa (Scribe), 9/27/2023    Rigoberto ZEPEDA M.D. personally performed the services described in this documentation, as scribed by Lee Correa in my presence, and it is both accurate and complete.     The note accurately reflects work and decisions made by me.  Rigoberto Bautista M.D.  9/27/2023  5:06 AM

## 2023-12-17 ENCOUNTER — HOSPITAL ENCOUNTER (OUTPATIENT)
Facility: MEDICAL CENTER | Age: 28
End: 2023-12-17
Attending: PHYSICIAN ASSISTANT
Payer: MEDICAID

## 2023-12-17 ENCOUNTER — OFFICE VISIT (OUTPATIENT)
Dept: URGENT CARE | Facility: CLINIC | Age: 28
End: 2023-12-17
Payer: MEDICAID

## 2023-12-17 VITALS
HEART RATE: 80 BPM | SYSTOLIC BLOOD PRESSURE: 88 MMHG | HEIGHT: 64 IN | DIASTOLIC BLOOD PRESSURE: 44 MMHG | OXYGEN SATURATION: 98 % | BODY MASS INDEX: 21.51 KG/M2 | RESPIRATION RATE: 16 BRPM | TEMPERATURE: 100.2 F | WEIGHT: 126 LBS

## 2023-12-17 DIAGNOSIS — N89.8 VAGINAL DISCHARGE: ICD-10-CM

## 2023-12-17 DIAGNOSIS — N30.01 ACUTE CYSTITIS WITH HEMATURIA: ICD-10-CM

## 2023-12-17 DIAGNOSIS — R30.0 DYSURIA: Primary | ICD-10-CM

## 2023-12-17 DIAGNOSIS — R35.0 URINARY FREQUENCY: ICD-10-CM

## 2023-12-17 LAB
APPEARANCE UR: NORMAL
BILIRUB UR STRIP-MCNC: NEGATIVE MG/DL
COLOR UR AUTO: NORMAL
GLUCOSE UR STRIP.AUTO-MCNC: NEGATIVE MG/DL
KETONES UR STRIP.AUTO-MCNC: NEGATIVE MG/DL
LEUKOCYTE ESTERASE UR QL STRIP.AUTO: NORMAL
NITRITE UR QL STRIP.AUTO: NEGATIVE
PH UR STRIP.AUTO: 6.5 [PH] (ref 5–8)
POCT INT CON NEG: NEGATIVE
POCT INT CON POS: POSITIVE
POCT URINE PREGNANCY TEST: NEGATIVE
PROT UR QL STRIP: 100 MG/DL
RBC UR QL AUTO: NORMAL
SP GR UR STRIP.AUTO: >=1.03
UROBILINOGEN UR STRIP-MCNC: 0.2 MG/DL

## 2023-12-17 PROCEDURE — 3074F SYST BP LT 130 MM HG: CPT | Performed by: PHYSICIAN ASSISTANT

## 2023-12-17 PROCEDURE — 81025 URINE PREGNANCY TEST: CPT | Performed by: PHYSICIAN ASSISTANT

## 2023-12-17 PROCEDURE — 3078F DIAST BP <80 MM HG: CPT | Performed by: PHYSICIAN ASSISTANT

## 2023-12-17 PROCEDURE — 99213 OFFICE O/P EST LOW 20 MIN: CPT | Performed by: PHYSICIAN ASSISTANT

## 2023-12-17 PROCEDURE — 81002 URINALYSIS NONAUTO W/O SCOPE: CPT | Performed by: PHYSICIAN ASSISTANT

## 2023-12-17 RX ORDER — NITROFURANTOIN 25; 75 MG/1; MG/1
100 CAPSULE ORAL 2 TIMES DAILY
Qty: 10 CAPSULE | Refills: 0 | Status: SHIPPED | OUTPATIENT
Start: 2023-12-17 | End: 2023-12-22

## 2023-12-17 ASSESSMENT — FIBROSIS 4 INDEX: FIB4 SCORE: 0.44

## 2023-12-18 LAB
FORWARD REASON: SPWHY: NORMAL
FORWARDED TO LAB: SPWHR: NORMAL
SPECIMEN SENT (2ND): SPWT2: NORMAL
SPECIMEN SENT (3RD): SPWT3: NORMAL
SPECIMEN SENT: SPWT1: NORMAL

## 2023-12-27 ASSESSMENT — ENCOUNTER SYMPTOMS: CHILLS: 0

## 2023-12-28 NOTE — PROGRESS NOTES
"Subjective     Debi Juarez is a 28 y.o. female who presents with Dysuria (X3 weeks. Sx are intermittent. C/o frequency, dysuria. Initially had abd pain. Was hydrating and drinking cranberry juice. Initially resolved and symptoms then reoccurred. )            Patient presents with dysuria on and off x 3 weeks.  PT has been drinking cranberry juice and other hydrating fluids with temporary improvement but no resolution.  No other complaints.        Dysuria   This is a new problem. The current episode started 1 to 4 weeks ago. The problem occurs intermittently. The problem has been waxing and waning. The quality of the pain is described as burning. The pain is moderate. There has been no fever. She is Sexually active. There is No history of pyelonephritis. Associated symptoms include frequency and urgency. Pertinent negatives include no chills. She has tried increased fluids for the symptoms. The treatment provided mild relief.       Review of Systems   Constitutional:  Negative for chills.   Genitourinary:  Positive for dysuria, frequency and urgency.   All other systems reviewed and are negative.             Objective     BP (!) 88/44 (BP Location: Right arm, Patient Position: Sitting, BP Cuff Size: Adult)   Pulse 80   Temp 37.9 °C (100.2 °F) (Temporal)   Resp 16   Ht 1.626 m (5' 4\")   Wt 57.2 kg (126 lb)   SpO2 98%   BMI 21.63 kg/m²      Physical Exam  Vitals and nursing note reviewed.   Constitutional:       General: She is not in acute distress.     Appearance: Normal appearance. She is well-developed and normal weight. She is not toxic-appearing.   HENT:      Head: Normocephalic and atraumatic.      Nose: Nose normal.      Mouth/Throat:      Mouth: Mucous membranes are moist.   Eyes:      Extraocular Movements: Extraocular movements intact.      Conjunctiva/sclera: Conjunctivae normal.      Pupils: Pupils are equal, round, and reactive to light.   Cardiovascular:      Rate and Rhythm: Normal " rate and regular rhythm.      Pulses: Normal pulses.      Heart sounds: Normal heart sounds.   Pulmonary:      Effort: Pulmonary effort is normal.      Breath sounds: Normal breath sounds.   Abdominal:      General: Bowel sounds are normal.      Palpations: Abdomen is soft.      Tenderness: There is no guarding or rebound.   Musculoskeletal:         General: Normal range of motion.      Cervical back: Normal range of motion and neck supple.   Skin:     General: Skin is warm and dry.      Capillary Refill: Capillary refill takes less than 2 seconds.   Neurological:      General: No focal deficit present.      Mental Status: She is alert and oriented to person, place, and time.      Gait: Gait normal.   Psychiatric:         Mood and Affect: Mood normal.         Behavior: Behavior is cooperative.                             Assessment & Plan                1. Dysuria  POCT Urinalysis    POCT Pregnancy    Chlamydia/GC, PCR (Genital/Anal swab)    VAGINAL PATHOGENS DNA PANEL    nitrofurantoin (MACROBID) 100 MG Cap    URINE CULTURE(NEW)      2. Urinary frequency  POCT Urinalysis    POCT Pregnancy    Chlamydia/GC, PCR (Genital/Anal swab)    VAGINAL PATHOGENS DNA PANEL    nitrofurantoin (MACROBID) 100 MG Cap    URINE CULTURE(NEW)      3. Vaginal discharge  POCT Urinalysis    POCT Pregnancy    Chlamydia/GC, PCR (Genital/Anal swab)    VAGINAL PATHOGENS DNA PANEL    nitrofurantoin (MACROBID) 100 MG Cap    URINE CULTURE(NEW)      4. Acute cystitis with hematuria  URINE CULTURE(NEW)      UA cloudy, dark, small leukocytes  Pregnancy: Negative    Patient HPI and physical exam consistent with acute cystitis.  I will treat with Macrobid twice daily x 7 days.    Patient will self swab for vaginal path culture.    Culture sent to lab, will call with any necessary treatment or treatment changes.     Differential diagnosis, supportive care, and indications for immediate follow-up discussed with patient.  Instructed to return to clinic or  nearest emergency department for any change in condition, further concerns, or worsening of symptoms.    I personally reviewed prior external notes and test results pertinent to today's visit.  I have independently reviewed and interpreted all diagnostics ordered during this urgent care visit.    PT should follow up with PCP in 1-2 days for re-evaluation if symptoms have not improved.      Discussed red flags and reasons to return to UC or ED.      Pt and/or family verbalized understanding of diagnosis and follow up instructions and was offered informational handout on diagnosis.  PT discharged.     Please note that this dictation was created using voice recognition software. I have made every reasonable attempt to correct obvious errors, but I expect that there may be errors of grammar and possibly content that I did not discover before finalizing the note.

## 2024-04-21 ENCOUNTER — HOSPITAL ENCOUNTER (EMERGENCY)
Facility: MEDICAL CENTER | Age: 29
End: 2024-04-21
Attending: EMERGENCY MEDICINE

## 2024-04-21 ENCOUNTER — APPOINTMENT (OUTPATIENT)
Dept: RADIOLOGY | Facility: MEDICAL CENTER | Age: 29
End: 2024-04-21
Attending: EMERGENCY MEDICINE

## 2024-04-21 VITALS
HEART RATE: 81 BPM | RESPIRATION RATE: 24 BRPM | BODY MASS INDEX: 21.08 KG/M2 | HEIGHT: 64 IN | WEIGHT: 123.46 LBS | SYSTOLIC BLOOD PRESSURE: 95 MMHG | TEMPERATURE: 98.2 F | OXYGEN SATURATION: 95 % | DIASTOLIC BLOOD PRESSURE: 55 MMHG

## 2024-04-21 DIAGNOSIS — J10.1 INFLUENZA A: ICD-10-CM

## 2024-04-21 DIAGNOSIS — J02.0 STREP PHARYNGITIS: ICD-10-CM

## 2024-04-21 LAB
ALBUMIN SERPL BCP-MCNC: 4.8 G/DL (ref 3.2–4.9)
ALBUMIN/GLOB SERPL: 1.4 G/DL
ALP SERPL-CCNC: 70 U/L (ref 30–99)
ALT SERPL-CCNC: 9 U/L (ref 2–50)
ANION GAP SERPL CALC-SCNC: 15 MMOL/L (ref 7–16)
APPEARANCE UR: CLEAR
AST SERPL-CCNC: 11 U/L (ref 12–45)
BASOPHILS # BLD AUTO: 0.1 % (ref 0–1.8)
BASOPHILS # BLD: 0.01 K/UL (ref 0–0.12)
BILIRUB SERPL-MCNC: 0.3 MG/DL (ref 0.1–1.5)
BILIRUB UR QL STRIP.AUTO: NEGATIVE
BUN SERPL-MCNC: 15 MG/DL (ref 8–22)
CALCIUM ALBUM COR SERPL-MCNC: 8.8 MG/DL (ref 8.5–10.5)
CALCIUM SERPL-MCNC: 9.4 MG/DL (ref 8.5–10.5)
CHLORIDE SERPL-SCNC: 103 MMOL/L (ref 96–112)
CO2 SERPL-SCNC: 20 MMOL/L (ref 20–33)
COLOR UR: YELLOW
CREAT SERPL-MCNC: 0.73 MG/DL (ref 0.5–1.4)
EOSINOPHIL # BLD AUTO: 0.05 K/UL (ref 0–0.51)
EOSINOPHIL NFR BLD: 0.6 % (ref 0–6.9)
ERYTHROCYTE [DISTWIDTH] IN BLOOD BY AUTOMATED COUNT: 50.4 FL (ref 35.9–50)
FLUAV RNA SPEC QL NAA+PROBE: POSITIVE
FLUBV RNA SPEC QL NAA+PROBE: NEGATIVE
GFR SERPLBLD CREATININE-BSD FMLA CKD-EPI: 114 ML/MIN/1.73 M 2
GLOBULIN SER CALC-MCNC: 3.5 G/DL (ref 1.9–3.5)
GLUCOSE SERPL-MCNC: 96 MG/DL (ref 65–99)
GLUCOSE UR STRIP.AUTO-MCNC: NEGATIVE MG/DL
HCG SERPL QL: NEGATIVE
HCT VFR BLD AUTO: 35.7 % (ref 37–47)
HGB BLD-MCNC: 11.9 G/DL (ref 12–16)
IMM GRANULOCYTES # BLD AUTO: 0.01 K/UL (ref 0–0.11)
IMM GRANULOCYTES NFR BLD AUTO: 0.1 % (ref 0–0.9)
KETONES UR STRIP.AUTO-MCNC: 15 MG/DL
LACTATE SERPL-SCNC: 0.9 MMOL/L (ref 0.5–2)
LEUKOCYTE ESTERASE UR QL STRIP.AUTO: NEGATIVE
LYMPHOCYTES # BLD AUTO: 0.35 K/UL (ref 1–4.8)
LYMPHOCYTES NFR BLD: 4.3 % (ref 22–41)
MCH RBC QN AUTO: 27.7 PG (ref 27–33)
MCHC RBC AUTO-ENTMCNC: 33.3 G/DL (ref 32.2–35.5)
MCV RBC AUTO: 83.2 FL (ref 81.4–97.8)
MICRO URNS: ABNORMAL
MONOCYTES # BLD AUTO: 0.51 K/UL (ref 0–0.85)
MONOCYTES NFR BLD AUTO: 6.3 % (ref 0–13.4)
NEUTROPHILS # BLD AUTO: 7.15 K/UL (ref 1.82–7.42)
NEUTROPHILS NFR BLD: 88.6 % (ref 44–72)
NITRITE UR QL STRIP.AUTO: NEGATIVE
NRBC # BLD AUTO: 0 K/UL
NRBC BLD-RTO: 0 /100 WBC (ref 0–0.2)
PH UR STRIP.AUTO: 6.5 [PH] (ref 5–8)
PLATELET # BLD AUTO: 252 K/UL (ref 164–446)
PMV BLD AUTO: 10.6 FL (ref 9–12.9)
POTASSIUM SERPL-SCNC: 3.8 MMOL/L (ref 3.6–5.5)
PROT SERPL-MCNC: 8.3 G/DL (ref 6–8.2)
PROT UR QL STRIP: NEGATIVE MG/DL
RBC # BLD AUTO: 4.29 M/UL (ref 4.2–5.4)
RBC UR QL AUTO: NEGATIVE
RSV RNA SPEC QL NAA+PROBE: NEGATIVE
S PYO DNA SPEC NAA+PROBE: DETECTED
SARS-COV-2 RNA RESP QL NAA+PROBE: NOTDETECTED
SODIUM SERPL-SCNC: 138 MMOL/L (ref 135–145)
SP GR UR STRIP.AUTO: 1.02
UROBILINOGEN UR STRIP.AUTO-MCNC: 0.2 MG/DL
WBC # BLD AUTO: 8.1 K/UL (ref 4.8–10.8)

## 2024-04-21 PROCEDURE — 85025 COMPLETE CBC W/AUTO DIFF WBC: CPT

## 2024-04-21 PROCEDURE — 84703 CHORIONIC GONADOTROPIN ASSAY: CPT

## 2024-04-21 PROCEDURE — 71045 X-RAY EXAM CHEST 1 VIEW: CPT

## 2024-04-21 PROCEDURE — 87040 BLOOD CULTURE FOR BACTERIA: CPT

## 2024-04-21 PROCEDURE — 700102 HCHG RX REV CODE 250 W/ 637 OVERRIDE(OP): Mod: UD | Performed by: EMERGENCY MEDICINE

## 2024-04-21 PROCEDURE — 83605 ASSAY OF LACTIC ACID: CPT

## 2024-04-21 PROCEDURE — 87077 CULTURE AEROBIC IDENTIFY: CPT | Mod: 91

## 2024-04-21 PROCEDURE — 700111 HCHG RX REV CODE 636 W/ 250 OVERRIDE (IP): Mod: JZ,UD | Performed by: EMERGENCY MEDICINE

## 2024-04-21 PROCEDURE — 0241U HCHG SARS-COV-2 COVID-19 NFCT DS RESP RNA 4 TRGT ED POC: CPT

## 2024-04-21 PROCEDURE — 700105 HCHG RX REV CODE 258: Performed by: EMERGENCY MEDICINE

## 2024-04-21 PROCEDURE — 80053 COMPREHEN METABOLIC PANEL: CPT

## 2024-04-21 PROCEDURE — 81003 URINALYSIS AUTO W/O SCOPE: CPT

## 2024-04-21 PROCEDURE — A9270 NON-COVERED ITEM OR SERVICE: HCPCS | Mod: UD | Performed by: EMERGENCY MEDICINE

## 2024-04-21 PROCEDURE — 36415 COLL VENOUS BLD VENIPUNCTURE: CPT

## 2024-04-21 PROCEDURE — 96374 THER/PROPH/DIAG INJ IV PUSH: CPT

## 2024-04-21 PROCEDURE — 87651 STREP A DNA AMP PROBE: CPT

## 2024-04-21 PROCEDURE — 99284 EMERGENCY DEPT VISIT MOD MDM: CPT

## 2024-04-21 PROCEDURE — 87150 DNA/RNA AMPLIFIED PROBE: CPT

## 2024-04-21 PROCEDURE — 87086 URINE CULTURE/COLONY COUNT: CPT

## 2024-04-21 RX ORDER — AMOXICILLIN 500 MG/1
1000 CAPSULE ORAL DAILY
Qty: 20 CAPSULE | Refills: 0 | Status: ACTIVE | OUTPATIENT
Start: 2024-04-21 | End: 2024-04-21

## 2024-04-21 RX ORDER — AMOXICILLIN 500 MG/1
1000 CAPSULE ORAL DAILY
Qty: 20 CAPSULE | Refills: 0 | Status: ACTIVE | OUTPATIENT
Start: 2024-04-21 | End: 2024-05-01

## 2024-04-21 RX ORDER — SODIUM CHLORIDE, SODIUM LACTATE, POTASSIUM CHLORIDE, CALCIUM CHLORIDE 600; 310; 30; 20 MG/100ML; MG/100ML; MG/100ML; MG/100ML
1000 INJECTION, SOLUTION INTRAVENOUS ONCE
Status: COMPLETED | OUTPATIENT
Start: 2024-04-21 | End: 2024-04-21

## 2024-04-21 RX ORDER — ACETAMINOPHEN 325 MG/1
975 TABLET ORAL ONCE
Status: COMPLETED | OUTPATIENT
Start: 2024-04-21 | End: 2024-04-21

## 2024-04-21 RX ORDER — ONDANSETRON 2 MG/ML
4 INJECTION INTRAMUSCULAR; INTRAVENOUS ONCE
Status: COMPLETED | OUTPATIENT
Start: 2024-04-21 | End: 2024-04-21

## 2024-04-21 RX ADMIN — SODIUM CHLORIDE, POTASSIUM CHLORIDE, SODIUM LACTATE AND CALCIUM CHLORIDE 1000 ML: 600; 310; 30; 20 INJECTION, SOLUTION INTRAVENOUS at 11:03

## 2024-04-21 RX ADMIN — ACETAMINOPHEN 975 MG: 325 TABLET ORAL at 11:01

## 2024-04-21 RX ADMIN — ONDANSETRON 4 MG: 2 INJECTION INTRAMUSCULAR; INTRAVENOUS at 11:03

## 2024-04-21 RX ADMIN — IBUPROFEN 800 MG: 200 TABLET, FILM COATED ORAL at 11:01

## 2024-04-21 ASSESSMENT — FIBROSIS 4 INDEX: FIB4 SCORE: 0.44

## 2024-04-21 NOTE — ED PROVIDER NOTES
ED Provider Note    CHIEF COMPLAINT  Chief Complaint   Patient presents with    Flu Like Symptoms     Pt reports symptoms this morning, pt explains that her son recently had same symptoms.     Body Aches    Headache    Cough    Sore Throat       EXTERNAL RECORDS REVIEWED  Outpatient Notes the patient was seen as an outpatient urgent care on December 17, 2023 for dysuria and had a negative urine test and negative pregnancy test    HPI/ROS  LIMITATION TO HISTORY   Select: : None  OUTSIDE HISTORIAN(S):  Patient's wife and    Debi Juarez is a 28 y.o. female who presents stating that she has had 24 hours of bodyaches, headache, cough, sore throat, she vomited once this morning.  She denies diarrhea.  She says her son has been sick with similar symptoms.  She denies burning with urination.  She denies abdominal pain.    PAST MEDICAL HISTORY   has a past medical history of Psychiatric problem (05/10/2021) and Pulmonary emphysema (HCC).    SURGICAL HISTORY   has a past surgical history that includes cervical conization (N/A, 6/29/2018); primary c section (N/A, 3/8/2018); lap,diagnostic abdomen (5/12/2021); salpingectomy (Bilateral, 5/12/2021); and ovarian cystectomy (Left, 5/12/2021).    FAMILY HISTORY  Family History   Problem Relation Age of Onset    No Known Problems Mother     No Known Problems Father     No Known Problems Sister     No Known Problems Brother     Cancer Maternal Grandmother     No Known Problems Maternal Grandfather     Stroke Paternal Grandmother     No Known Problems Paternal Grandfather        SOCIAL HISTORY  Social History     Tobacco Use    Smoking status: Never    Smokeless tobacco: Never   Vaping Use    Vaping Use: Never used   Substance and Sexual Activity    Alcohol use: No    Drug use: No    Sexual activity: Not Currently     Partners: Male     Comment: None        CURRENT MEDICATIONS  None      ALLERGIES  No Known Allergies    PHYSICAL EXAM  VITAL SIGNS: BP 95/55   Pulse 81    "Temp 36.8 °C (98.2 °F) (Temporal)   Resp (!) 24   Ht 1.626 m (5' 4\")   Wt 56 kg (123 lb 7.3 oz)   SpO2 95%   BMI 21.19 kg/m²    Constitutional: Alert.  HENT: No signs of trauma, Bilateral external ears normal, Nose normal.   Eyes: Pupils are equal and reactive, Conjunctiva normal, Non-icteric.   Neck: Normal range of motion, No tenderness, Supple, No stridor.   Lymphatic: No lymphadenopathy noted.   Cardiovascular: Regular rate and rhythm, no murmurs.   Thorax & Lungs: Normal breath sounds, No respiratory distress, No wheezing, No chest tenderness.   Abdomen: Bowel sounds normal, Soft, No tenderness, No peritoneal signs, No masses, No pulsatile masses.   Skin: Warm, Dry, No erythema, No rash.   Back: No bony tenderness, No CVA tenderness.   Extremities: Intact distal pulses, No edema, No tenderness, No cyanosis.  Musculoskeletal: Good range of motion in all major joints. No tenderness to palpation or major deformities noted.   Neurologic: Alert , Normal motor function, Normal sensory function, No focal deficits noted.   Psychiatric: Affect normal, Judgment normal, Mood normal.       EKG/LABS      Labs Reviewed   CBC WITH DIFFERENTIAL - Abnormal; Notable for the following components:       Result Value    Hemoglobin 11.9 (*)     Hematocrit 35.7 (*)     RDW 50.4 (*)     Neutrophils-Polys 88.60 (*)     Lymphocytes 4.30 (*)     Lymphs (Absolute) 0.35 (*)     All other components within normal limits   COMP METABOLIC PANEL - Abnormal; Notable for the following components:    AST(SGOT) 11 (*)     Total Protein 8.3 (*)     All other components within normal limits   URINALYSIS - Abnormal; Notable for the following components:    Ketones 15 (*)     All other components within normal limits   GROUP A STREP BY PCR - Abnormal; Notable for the following components:    Group A Strep by PCR DETECTED (*)     All other components within normal limits   POC COV-2, FLU A/B, RSV BY PCR - Abnormal; Notable for the following " components:    POC Influenza A RNA, PCR POSITIVE (*)     All other components within normal limits   LACTIC ACID   HCG QUAL SERUM   ESTIMATED GFR   LACTIC ACID   LACTIC ACID   URINE CULTURE(NEW)   BLOOD CULTURE   BLOOD CULTURE   POCT COV-2, FLU A/B, RSV BY PCR         RADIOLOGY/PROCEDURES   I have independently interpreted the diagnostic imaging associated with this visit and am waiting the final reading from the radiologist.   My preliminary interpretation is as follows: Negative chest x-ray    Radiologist interpretation:  DX-CHEST-PORTABLE (1 VIEW)   Final Result      No evidence of acute cardiopulmonary process.          COURSE & MEDICAL DECISION MAKING    ASSESSMENT, COURSE AND PLAN  Care Narrative:       The patient presents with fever, cough, body aches, she has triggered our sepsis protocol, labs ordered, chest x-ray was ordered, flu RSV COVID was ordered.  I have added on a strep PCR test.  I will order 1 L LR IV.  4 mg IV Zofran, Tylenol 975 mg p.o. and ibuprofen 800 mg p.o.      Hydration: Based on the patient's presentation of Other vomiting, clinical dehydration the patient was given IV fluids. IV Hydration was used because oral hydration was not adequate alone. Upon recheck following hydration, the patient was feeling improved.    The patient strep test is positive for group A strep.  Her influenza A test is positive.  Her lactic acid is normal.  Her UA is negative.  Her electrolytes and CBC are unremarkable.  I will treat the patient with amoxicillin, at this time I do not recommend Tamiflu for healthy 28-year-old with influenza.  The patient was counseled on drinking fluids, taking Tylenol and Motrin as needed.            ADDITIONAL PROBLEMS MANAGED  Strep pharyngitis, influenza A    DISPOSITION AND DISCUSSIONS  I have discussed management of the patient with the following physicians and JONATHAN's: None    Discussion of management with other [facility] or appropriate source(s): None     Escalation of care  considered, and ultimately not performed:acute inpatient care management, however at this time, the patient is most appropriate for outpatient management    Barriers to care at this time, including but not limited to:  None .     Decision tools and prescription drugs considered including, but not limited to:  Amoxicillin to treat strep pharyngitis prescription .    The patient will return for new or worsening symptoms and is stable at the time of discharge.            DISPOSITION:  Patient will be discharged home in stable condition.    FOLLOW UP:  [facility], Emergency Dept  [address] [address] [phone]    If symptoms worsen    AGATHA LoboRLisaN.  [address] [id number] [phone]      As needed      OUTPATIENT MEDICATIONS:  New Prescriptions    AMOXICILLIN (AMOXIL) 500 MG CAP    Take 2 Capsules by mouth every day for 10 days.         FINAL DIAGNOSIS  1. Influenza A    2. Strep pharyngitis           Electronically signed by: Mario Ware M.D., 4/21/2024 10:46 AM

## 2024-04-21 NOTE — ED TRIAGE NOTES
Chief Complaint   Patient presents with    Flu Like Symptoms     Pt reports symptoms this morning, pt explains that her son recently had same symptoms.     Body Aches    Headache    Cough    Sore Throat     Explained to pt triage process, made pt aware to tell this RN/staff of any changes/concerns, pt verbalized understanding of process and instructions given. Pt to CHANDRA mchugh.

## 2024-04-21 NOTE — LETTER
4/22/2024               Debi Juarez  [address] [place] [id number]        Dear Debi (MR#[id number])    As we have been unable to contact you by phone, this letter is sent in regards to your recent visit to the [facility] on 4/21/2024. During the visit, tests were performed to assist the physician in a medical diagnosis. A review of those tests requires that we notify you of the following:    Your blood culture and sensitivity was POSITIVE for a bacteria called Staphylococcus species in 1 out of 2 blood culture sets. This bacteria is rarely associated with true infection and most likely represents contamination of the blood culture sample. During your ED visit no signs or symptoms associated with concern for a bloodstream infection were noted. Common symptoms would include fever or chills, constantly feeling fatigued or lethargic, a feeling that your heart is beating fast, or flu-like symptoms that are not improving.      IF YOU ARE NOT FEELING BETTER PLEASE CONTACT ME AS SOON AS POSSIBLE AT THE NUMBER BELOW.       Thank you for your cooperation in the matter.    Sincerely,  ED Culture Follow-Up Staff  Dandre Velazquez, PharmD    [facility], Emergency Department  [address] [place] [id number] [phone] (ED Culture Line)

## 2024-04-21 NOTE — ED NOTES
Pt WC to YE [address]  from the lobby with a partner. Pt changed into a gown and placed on the monitor. Agree with triage note. Chart up for ERP.

## 2024-04-21 NOTE — ED NOTES
All lines and monitors disconnected.  Discharge instructions were reviewed, questions answered.  Pt provided with prescriptions X 1.  Pt states all belongings in possession.  Pt ambulates to the lobby, escorted by RN and family.

## 2024-04-22 NOTE — DISCHARGE PLANNING
This nurse was asked to update the patients pharmacy to [facility] on [address] as the [facility] in [place] is closed.  CM asked a provider to please send the prescribed amoxicille to the [facility].  Provider agreed to resend it.  CM called patient to advise the prescriptions has been sent and to call before she go to confirm they have received it.

## 2024-04-22 NOTE — ED NOTES
ED Positive Culture Follow-up/Notification Note:    Date: 4/22/2024   Patient seen in the ED on 4/21/2024 for body and head aches, cough, sore throat, and one episode of vomiting. Patient reports son being ill with same symptoms. Patient denies any diarrhea, abdominal pain, or dysuria.  Physical exam notable for no CVA tenderness or skin abnormalities. No acute findings on CXR.  In the ED patient was afebrile with stable vitals, WBC count 8.1, lactate 0.9.   1. Influenza A    2. Strep pharyngitis       Discharge Medication List as of 4/21/2024  1:04 PM        START taking these medications    Details   amoxicillin (AMOXIL) 500 MG Cap Take 2 Capsules by mouth every day for 10 days., Disp-20 Capsule, R-0, Normal             Allergies: Patient has no known allergies.     Vitals:    04/21/24 1145 04/21/24 1200 04/21/24 1230 04/21/24 1245   BP:  95/55     Pulse:  81     Resp: (!) 24 19 20 (!) 24   Temp:    36.8 °C (98.2 °F)   TempSrc:    Temporal   SpO2:  95%     Weight:       Height:           Final cultures:   Results       Procedure Component Value Units Date/Time    Blood Culture - Draw one from central line and one from peripheral site [[id number]]  (Abnormal) Collected: 04/21/24 1009    Order Status: Completed Specimen: Blood from Peripheral Updated: 04/22/24 0907     Significant Indicator POS     Source BLD     Site PERIPHERAL     Culture Result Growth detected by Bactec instrument. 04/22/2024  09:02  Gram Stain: Gram positive cocci: Possible Staphylococcus sp.  Negative for Staphylococcus aureus and MRSA by PCR. Correlate  ongoing need for antibiotics with clinical condition.  Further report to follow.      Narrative:      CALL  [place]  ER tel. ,  CALLED  ER tel.  04/22/2024, 09:07, RESULTS CALLED TO:[id number] ER  Right AC    Blood Culture - Draw one from central line and one from peripheral site [[id number]] Collected: 04/21/24 1009    Order Status: Completed Specimen: Blood from Line Updated: 04/22/24 0839      Significant Indicator NEG     Source BLD     Site Peripheral     Culture Result No Growth  Note: Blood cultures are incubated for 5 days and  are monitored continuously.Positive blood cultures  are called to the RN and reported as soon as  they are identified.      Narrative:      Left AC    Group A Strep by PCR [[id number]]  (Abnormal) Collected: 04/21/24 1110    Order Status: Completed Specimen: Throat Updated: 04/21/24 1150     Group A Strep by PCR DETECTED    Urinalysis [[id number]]  (Abnormal) Collected: 04/21/24 1038    Order Status: Completed Specimen: Urine Updated: 04/21/24 1053     Color Yellow     Character Clear     Specific Gravity 1.021     Ph 6.5     Glucose Negative mg/dL      Ketones 15 mg/dL      Protein Negative mg/dL      Bilirubin Negative     Urobilinogen, Urine 0.2     Nitrite Negative     Leukocyte Esterase Negative     Occult Blood Negative     Micro Urine Req see below     Comment: Microscopic examination not performed when specimen is clear  and chemically negative for protein, blood, leukocyte esterase  and nitrite.         Urine Culture (New) [[id number]] Collected: 04/21/24 1038    Order Status: Sent Specimen: Urine Updated: 04/21/24 1047          Plan:   Patient's blood cultures returned positive for GPCs in 1/2 sets, no Staph aureus present per PCR results. Blood culture result likely represents a coagulase negative staph that is most often associated with contamination. Patient was afebrile in ED with WBC count WLN and stable vitals. Presentation along with organism only being present in 1/2 sets further supports likelihood of contamination.   Attempted to contact patient to discuss results, no answer at listed number, voicemail left with patient. [place] message sent to patient notifying of results, likelihood that result is contamination, and to return call should patient not be feeling improved. Will re-attempt to contact patient tomorrow.   ADDENDUM 4/23/24  Patient reports some  improvement since being in the ED. Patient reports using Tylenol to control fever, denies any increased HR, does feel lethargic but this is likely due to the flu. Patient denies any indwelling hardward which would increase risk for true staph infection. Discussed with patient that results likely represent contaminant and that no further action is needed. Patient advised to monitor for continued signs of improvement of flu and strep throat.   Patient's line became disconnected while discussing, attempted to re-contact patient and received no answer, voicemail left for patient.   Patient's urine culture additionally returned positive for >100k Group B strep. Group B Strep in the urine is a common urogenital colonizer and not likely a true urinary pathogen. Patient denied any UTI symptoms during ED visit. No need to treat with antimicrobials at this time.     Dandre Velazquez, PharmD

## 2024-04-23 LAB
BACTERIA BLD CULT: ABNORMAL
BACTERIA BLD CULT: ABNORMAL
BACTERIA UR CULT: ABNORMAL
BACTERIA UR CULT: ABNORMAL
SIGNIFICANT IND 70042: ABNORMAL
SIGNIFICANT IND 70042: ABNORMAL
SITE SITE: ABNORMAL
SITE SITE: ABNORMAL
SOURCE SOURCE: ABNORMAL
SOURCE SOURCE: ABNORMAL

## 2024-04-26 LAB
BACTERIA BLD CULT: NORMAL
SIGNIFICANT IND 70042: NORMAL
SITE SITE: NORMAL
SOURCE SOURCE: NORMAL

## 2025-03-20 PROCEDURE — 99282 EMERGENCY DEPT VISIT SF MDM: CPT

## 2025-03-20 ASSESSMENT — FIBROSIS 4 INDEX: FIB4 SCORE: 0.42

## 2025-03-21 ENCOUNTER — HOSPITAL ENCOUNTER (EMERGENCY)
Facility: MEDICAL CENTER | Age: 30
End: 2025-03-21
Attending: STUDENT IN AN ORGANIZED HEALTH CARE EDUCATION/TRAINING PROGRAM

## 2025-03-21 ENCOUNTER — PHARMACY VISIT (OUTPATIENT)
Dept: PHARMACY | Facility: MEDICAL CENTER | Age: 30
End: 2025-03-21
Payer: COMMERCIAL

## 2025-03-21 VITALS
SYSTOLIC BLOOD PRESSURE: 124 MMHG | RESPIRATION RATE: 17 BRPM | BODY MASS INDEX: 22.36 KG/M2 | WEIGHT: 130.95 LBS | TEMPERATURE: 98.4 F | DIASTOLIC BLOOD PRESSURE: 72 MMHG | OXYGEN SATURATION: 98 % | HEIGHT: 64 IN | HEART RATE: 65 BPM

## 2025-03-21 DIAGNOSIS — H66.002 ACUTE SUPPURATIVE OTITIS MEDIA OF LEFT EAR WITHOUT SPONTANEOUS RUPTURE OF TYMPANIC MEMBRANE, RECURRENCE NOT SPECIFIED: Primary | ICD-10-CM

## 2025-03-21 PROCEDURE — RXMED WILLOW AMBULATORY MEDICATION CHARGE: Performed by: STUDENT IN AN ORGANIZED HEALTH CARE EDUCATION/TRAINING PROGRAM

## 2025-03-21 ASSESSMENT — PAIN DESCRIPTION - PAIN TYPE: TYPE: ACUTE PAIN

## 2025-03-21 NOTE — ED NOTES
Pt discharged to home. Discharge paperwork provided. Education provided by ERP. Reinforced discharge instructions.  Pt was given follow up instructions and prescriptions  Pt verbalized understanding of all instructions for discharge.   Patient went out of the ER ambulatory with steady gait., alert and oriented x 4, with all belongings.

## 2025-03-21 NOTE — DISCHARGE INSTRUCTIONS
You are seen and evaluated the emergency department for your ear pain.  You have an ear infection that will require antibiotics.  Please take the antibiotics as prescribed, follow-up with your primary care doctor, if you have any other concerning symptoms or feel worse despite taking the antibiotics please return to the emergency department for reassessment.  I recommend continue to take over-the-counter medication such as ibuprofen, Tylenol as needed for pain at home.

## 2025-03-21 NOTE — ED PROVIDER NOTES
ED Provider Note    CHIEF COMPLAINT  Chief Complaint   Patient presents with    Ear Pain     left       EXTERNAL RECORDS REVIEWED  Reviewed previous ED visits from 4/21/2024 patient was seen for flulike symptoms, had reassuring workup and was discharged home.  She was positive for influenza at that time as well as strep pharyngitis  HPI/ROS  LIMITATION TO HISTORY   Select: : None  OUTSIDE HISTORIAN(S):  None    Debi Juarez is a 29 y.o. female who presents emergency department chief complaint of left ear pain.  Patient states that she had what she believes to be a left ear infection couple weeks ago, she trialed some remedies and had some improvement in her symptoms.  She states that her symptoms which include left ear pain has come back.  States that she has had some intermittent drainage from the ear, denies any freshwater exposure, recent travel, fevers, chills, headache, vision changes, focal numbness or weakness.  Eyes any recent infections, sore throat, nasal congestion.    PAST MEDICAL HISTORY   has a past medical history of Psychiatric problem (05/10/2021) and Pulmonary emphysema (HCC).    SURGICAL HISTORY   has a past surgical history that includes cervical conization (N/A, 6/29/2018); primary c section (N/A, 3/8/2018); lap,diagnostic abdomen (5/12/2021); salpingectomy (Bilateral, 5/12/2021); and ovarian cystectomy (Left, 5/12/2021).    FAMILY HISTORY  Family History   Problem Relation Age of Onset    No Known Problems Mother     No Known Problems Father     No Known Problems Sister     No Known Problems Brother     Cancer Maternal Grandmother     No Known Problems Maternal Grandfather     Stroke Paternal Grandmother     No Known Problems Paternal Grandfather        SOCIAL HISTORY  Social History     Tobacco Use    Smoking status: Never    Smokeless tobacco: Never   Vaping Use    Vaping status: Never Used   Substance and Sexual Activity    Alcohol use: No    Drug use: No    Sexual activity: Not  "Currently     Partners: Male     Comment: None        CURRENT MEDICATIONS  Home Medications    **Home medications have not yet been reviewed for this encounter**         ALLERGIES  No Known Allergies    PHYSICAL EXAM  VITAL SIGNS: /84   Pulse 70   Temp 36.9 °C (98.4 °F) (Temporal)   Resp 18   Ht 1.626 m (5' 4\")   Wt 59.4 kg (130 lb 15.3 oz)   LMP 03/14/2025 (Exact Date)   SpO2 97%   BMI 22.48 kg/m²    Constitutional: Awake and alert  HENT: Left tympanic membrane erythematous, bulging with purulence, no evidence of any rupture, no evidence of otitis media, no tenderness to palpation over the mastoid  Eyes: Normal inspection, no conjunctival injection  Neck: Grossly normal range of motion.  Cardiovascular: Normal heart rate, Normal rhythm.  Symmetric peripheral pulses.   Thorax & Lungs: No respiratory distress, No wheezing, No rales, No rhonchi, No chest tenderness.   Abdomen: Bowel sounds normal, soft, non-distended, nontender, no mass  Skin: No obvious rash.  Back: No tenderness, No CVA tenderness.   Extremities: No clubbing, cyanosis, edema, no Homans or cords.  Neurologic: Grossly normal   Psychiatric: Normal for situation      COURSE & MEDICAL DECISION MAKING    ASSESSMENT, COURSE AND PLAN  Care Narrative: Patient is a 29-year-old female with no previous medical problems presenting to the emergency department chief complaint of left ear pain.  No fevers or chills, arrives hemodynamically stable.  On exam patient does have signs of acute otitis media without perforation or rupture.  No systemic signs of illness, no signs of otitis externa, mastoiditis, necrotizing infection.  Patient will be prescribed antibiotics, given strict return precautions and anticipatory guidance which patient understood at time of discharge.          ADDITIONAL PROBLEMS MANAGED  Acute otitis media    DISPOSITION AND DISCUSSIONS  I have discussed management of the patient with the following physicians and JONATHAN's: " None    Discussion of management with other [facility] or appropriate source(s): None     Escalation of care considered, and ultimately not performed:Laboratory analysis and diagnostic imaging    Barriers to care at this time, including but not limited to: None   .     Decision tools and prescription drugs considered including, but not limited to: Antibiotics   .    FINAL DIAGNOSIS  1. Acute suppurative otitis media of left ear without spontaneous rupture of tympanic membrane, recurrence not specified         Electronically signed by: Freddy Murray M.D., 3/21/2025 12:34 AM

## 2025-03-21 NOTE — ED TRIAGE NOTES
"Pt ambulated into triage with c/o left ear pain. Pt sts she had an ear infection a couple months ago and now it is back. Pt also sts she was at work earlier and had an episode of \"low BP\". Pt did not check her BP at work. Pt VSS in triage. Pt is A&O and ambulatory. Placed in lobby pending ER room.   "

## 2025-06-14 ENCOUNTER — OFFICE VISIT (OUTPATIENT)
Dept: URGENT CARE | Facility: PHYSICIAN GROUP | Age: 30
End: 2025-06-14
Payer: COMMERCIAL

## 2025-06-14 VITALS
OXYGEN SATURATION: 95 % | RESPIRATION RATE: 14 BRPM | BODY MASS INDEX: 22.67 KG/M2 | TEMPERATURE: 97.8 F | DIASTOLIC BLOOD PRESSURE: 60 MMHG | HEART RATE: 66 BPM | SYSTOLIC BLOOD PRESSURE: 98 MMHG | HEIGHT: 64 IN | WEIGHT: 132.8 LBS

## 2025-06-14 DIAGNOSIS — H66.005 RECURRENT ACUTE SUPPURATIVE OTITIS MEDIA WITHOUT SPONTANEOUS RUPTURE OF LEFT TYMPANIC MEMBRANE: Primary | ICD-10-CM

## 2025-06-14 PROCEDURE — 3074F SYST BP LT 130 MM HG: CPT

## 2025-06-14 PROCEDURE — 99213 OFFICE O/P EST LOW 20 MIN: CPT

## 2025-06-14 PROCEDURE — 3078F DIAST BP <80 MM HG: CPT

## 2025-06-14 RX ORDER — DOXYCYCLINE HYCLATE 100 MG
100 TABLET ORAL 2 TIMES DAILY
Qty: 14 TABLET | Refills: 0 | Status: SHIPPED | OUTPATIENT
Start: 2025-06-14 | End: 2025-06-21

## 2025-06-14 ASSESSMENT — ENCOUNTER SYMPTOMS
FEVER: 0
COUGH: 0
SORE THROAT: 0
CHILLS: 0
MYALGIAS: 0

## 2025-06-14 ASSESSMENT — FIBROSIS 4 INDEX: FIB4 SCORE: 0.42

## 2025-06-15 NOTE — PROGRESS NOTES
Subjective:   Chief Complaint  Debi Juarez is a 29 y.o. female who presents for Otalgia (About a month ago that comes and goes)      History of Present Illness  Patient presents with complaints of intermittent left ear pain with purulent drainage for the past week.  She states that approximately 2 months ago she was treated for an ear infection and prescribed Augmentin.  She states that she finished that course of antibiotics but since then she has had this intermittent lingering pain and clear drainage.  She states these past few days she has noticed purulent foul-smelling yellow discharge coming from her left ear with significant pain.  She reports taking acetaminophen with no relief of symptoms.  Denies bodyaches, chills, fevers.  Denies difficulty or changes in hearing.        Review of Systems  Review of Systems   Constitutional:  Negative for chills and fever.   HENT:  Positive for ear discharge and ear pain. Negative for congestion and sore throat.    Respiratory:  Negative for cough.    Cardiovascular:  Negative for chest pain.   Musculoskeletal:  Negative for myalgias.   Skin:  Negative for rash.       Past Medical History  Past Medical History[1]    Family History  Family History   Problem Relation Age of Onset    No Known Problems Mother     No Known Problems Father     No Known Problems Sister     No Known Problems Brother     Cancer Maternal Grandmother     No Known Problems Maternal Grandfather     Stroke Paternal Grandmother     No Known Problems Paternal Grandfather        Social History  Social History[2]    Surgical History  Past Surgical History[3]    Current Medications  Home Medications       Reviewed by Mario Alberto Jensen't (Medical Assistant) on 06/14/25 at 1903  Med List Status: <None>     Medication Last Dose Status        Patient Christiano Taking any Medications                           Allergies  Allergies[4]       Objective:     BP 98/60   Pulse 66   Temp 36.6 °C (97.8  "°F) (Temporal)   Resp 14   Ht 1.626 m (5' 4\")   Wt 60.2 kg (132 lb 12.8 oz)   SpO2 95%     Physical Exam  Constitutional:       Appearance: Normal appearance.   HENT:      Head: Normocephalic and atraumatic.      Right Ear: Tympanic membrane, ear canal and external ear normal.      Left Ear: Drainage present. Tympanic membrane is erythematous and bulging.      Ears:      Comments: Yellow drainage noted in the left ear canal.     Nose: Nose normal.      Mouth/Throat:      Mouth: Mucous membranes are moist.      Pharynx: Oropharynx is clear.   Eyes:      Conjunctiva/sclera: Conjunctivae normal.      Pupils: Pupils are equal, round, and reactive to light.   Cardiovascular:      Rate and Rhythm: Normal rate and regular rhythm.      Pulses: Normal pulses.      Heart sounds: Normal heart sounds.   Pulmonary:      Effort: Pulmonary effort is normal.      Breath sounds: Normal breath sounds.   Abdominal:      General: Bowel sounds are normal.   Skin:     General: Skin is warm and dry.      Capillary Refill: Capillary refill takes less than 2 seconds.   Neurological:      General: No focal deficit present.      Mental Status: She is alert and oriented to person, place, and time.   Psychiatric:         Mood and Affect: Mood normal.         Behavior: Behavior normal.         Assessment/Plan:     Diagnosis  1. Recurrent acute suppurative otitis media without spontaneous rupture of left tympanic membrane  - doxycycline (VIBRAMYCIN) 100 MG Tab; Take 1 Tablet by mouth 2 times a day for 7 days.  Dispense: 14 Tablet; Refill: 0  - Referral to ENT        MDM/Plan/Discussion  The patient is experiencing another episode of acute otitis media with yellow drainage.  She was prescribed a course of Augmentin for the initial incident.  She will be prescribed doxycycline for the secondary occurrence. The patient was educated to not  skip any antibiotic doses and take the entire prescription even if they are feeling better.  The patient " will also be referred to ENT if she continues to have recurrent ear infections.  She was educated on signs and symptoms to monitor for to prompt a return to urgent care or seek immediate medical attention at the nearest ED for further evaluation.          Shared decision-making was utilized with patient for treatment plan. Medication discussed included indication for use and the potential benefits and side effects. Education was provided regarding the aforementioned assessments.  Differential Diagnosis, natural history, and supportive care discussed. All of the patient's questions were answered to their satisfaction at the time of discharge. Patient was encouraged to monitor symptoms closely. Those signs and symptoms which would warrant concern and mandate seeking a higher level of service through the emergency department discussed at length.  Patient stated agreement and understanding of this plan of care.    Advised the patient to follow-up with the primary care physician for recheck, reevaluation, and consideration of further management.    Please note that this dictation was created using voice recognition software. I have made a reasonable attempt to correct obvious errors, but I expect that there are errors of grammar and possibly content that I did not discover before finalizing the note.    This note was electronically signed by JIMMY Perry         [1]   Past Medical History:  Diagnosis Date    Psychiatric problem 05/10/2021    Depression.    Pulmonary emphysema (HCC)    [2]   Social History  Socioeconomic History    Marital status: Single   Tobacco Use    Smoking status: Never    Smokeless tobacco: Never   Vaping Use    Vaping status: Never Used   Substance and Sexual Activity    Alcohol use: No    Drug use: No    Sexual activity: Not Currently     Partners: Male     Comment: None    [3]   Past Surgical History:  Procedure Laterality Date    FL LAP,DIAGNOSTIC ABDOMEN  5/12/2021    Procedure:  PELVISCOPY, LYSIS OF ADHESIONS;  Surgeon: Delisa Payton M.D.;  Location: SURGERY SAME DAY [facility];  Service: Obstetrics    SALPINGECTOMY Bilateral 5/12/2021    Procedure: SALPINGECTOMY;  Surgeon: Delisa Payton M.D.;  Location: SURGERY SAME DAY [facility];  Service: Obstetrics    OVARIAN CYSTECTOMY Left 5/12/2021    Procedure: OVARIAN CYSTECTOMY;  Surgeon: Delisa Payton M.D.;  Location: SURGERY SAME DAY [facility];  Service: Obstetrics    CERVICAL CONIZATION N/A 6/29/2018    Procedure: CERVICAL CONIZATION- COLD KNIFE;  Surgeon: Rustam Hill M.D.;  Location: SURGERY SAME DAY [facility];  Service: Gynecology    PRIMARY C SECTION N/A 3/8/2018    Procedure: PRIMARY C SECTION;  Surgeon: Rustam Hill M.D.;  Location: LABOR AND DELIVERY;  Service: Labor and Delivery   [4] No Known Allergies

## 2025-06-20 NOTE — Clinical Note
REFERRAL APPROVAL NOTICE         Sent on June 20, 2025                   Debi Juarez  [address] [place], [place] [address] [place] [address]                   Dear Ms. John Juarez,    After a careful review of the medical information and benefit coverage, [facility] has processed your referral. See below for additional details.    If applicable, you must be actively enrolled with your insurance for coverage of the authorized service. If you have any questions regarding your coverage, please contact your insurance directly.    REFERRAL INFORMATION   Referral #:  [id number]  Referred-To Provider    Referred-By Provider:  Otolaryngology    JIMMY Perry   [facility]      [address]  [place] [address]  [phone] [address] [facility] [address]  [phone]    Referral Start Date:  06/14/2025  Referral End Date:   06/14/2026             SCHEDULING  If you do not already have an appointment, please call [phone] to make an appointment.     MORE INFORMATION  If you do not already have a [employer] account, sign up at: [email or web address]  You can access your medical information, make appointments, see lab results, billing information, and more.  If you have questions regarding this referral, please contact  the [facility] Referrals department at:             [phone]. Monday - Friday 8:00AM - 5:00PM.     Sincerely,    [facility]

## (undated) DEVICE — PACK LAPAROSCOPY - (1/CA)

## (undated) DEVICE — PAD LAP STERILE 18 X 18 - (5/PK 40PK/CA)

## (undated) DEVICE — GLOVE BIOGEL INDICATOR SZ 7SURGICAL PF LTX - (50/BX 4BX/CA)

## (undated) DEVICE — CATHETER IV NON-SAFETY 18 GA X 1 1/4 (50/BX 4BX/CA)

## (undated) DEVICE — CATHETER IV 20 GA X 1-1/4 ---SURG.& SDS ONLY--- (50EA/BX)

## (undated) DEVICE — SUTURE 1 VICRYL PLUSCT-1 27IN - (36/BX)

## (undated) DEVICE — TRAY SRGPRP PVP IOD WT PRP - (20/CA)

## (undated) DEVICE — SODIUM CHL IRRIGATION 0.9% 1000ML (12EA/CA)

## (undated) DEVICE — DRESSING NON ADHERENT 3 X 4 - STERILE (100/BX 12BX/CA)

## (undated) DEVICE — CANISTER SUCTION 3000ML MECHANICAL FILTER AUTO SHUTOFF MEDI-VAC NONSTERILE LF DISP  (40EA/CA)

## (undated) DEVICE — LACTATED RINGERS INJ 1000 ML - (14EA/CA 60CA/PF)

## (undated) DEVICE — TUBE CONNECTING SUCTION - CLEAR PLASTIC STERILE 72 IN (50EA/CA)

## (undated) DEVICE — KIT  I.V. START (100EA/CA)

## (undated) DEVICE — SENSOR SPO2 NEO LNCS ADHESIVE (20/BX) SEE USER NOTES

## (undated) DEVICE — BLADE SURGICAL #11 - (50/BX)

## (undated) DEVICE — WATER IRRIG. STER. 1500 ML - (9/CA)

## (undated) DEVICE — MASK ANESTHESIA ADULT  - (100/CA)

## (undated) DEVICE — GOWN WARMING STANDARD FLEX - (30/CA)

## (undated) DEVICE — SLEEVE, VASO, THIGH, MED

## (undated) DEVICE — SPONGE GAUZESTER. 2X2 4-PL - (2/PK 50PK/BX 30BX/CS)

## (undated) DEVICE — STAPLER SKIN DISP - (6/BX 10BX/CA) VISISTAT

## (undated) DEVICE — TOWEL STOP TIMEOUT SAFETY FLAG (40EA/CA)

## (undated) DEVICE — GLOVE BIOGEL SZ 7 SURGICAL PF LTX - (50PR/BX 4BX/CA)

## (undated) DEVICE — 0 CHROMIC CT-1

## (undated) DEVICE — GLOVE, BIOGEL ECLIPSE, SZ 7.0, PF LTX (50/BX)

## (undated) DEVICE — KIT ANESTHESIA W/CIRCUIT & 3/LT BAG W/FILTER (20EA/CA)

## (undated) DEVICE — PACK C-SECTION (2EA/CA)

## (undated) DEVICE — GOWN SURGEONS X-LARGE - DISP. (30/CA)

## (undated) DEVICE — HEAD HOLDER JUNIOR/ADULT

## (undated) DEVICE — ELECTRODE 850 FOAM ADHESIVE - HYDROGEL RADIOTRNSPRNT (50/PK)

## (undated) DEVICE — HEMOSTAT ARISTA PWD 3 GRAM - (5/CA)

## (undated) DEVICE — PAD SANITARY 11IN MAXI IND WRAPPED  (12EA/PK 24PK/CA)

## (undated) DEVICE — DRESSING TRANSPARENT FILM TEGADERM 2.375 X 2.75"  (100EA/BX)"

## (undated) DEVICE — SET LEADWIRE 5 LEAD BEDSIDE DISPOSABLE ECG (1SET OF 5/EA)

## (undated) DEVICE — SUTURE GENERAL

## (undated) DEVICE — DERMABOND ADVANCED - (12EA/BX)

## (undated) DEVICE — Device

## (undated) DEVICE — SET EXTENSION WITH 2 PORTS (48EA/CA) ***PART #2C8610 IS A SUBSTITUTE*****

## (undated) DEVICE — TRAY SPINAL ANESTHESIA NON-SAFETY (10/CA)

## (undated) DEVICE — NEEDLE INSFL 120MM 14GA VRRS - (20/BX)

## (undated) DEVICE — NEPTUNE 4 PORT MANIFOLD - (20/PK)

## (undated) DEVICE — APPICATOR HEMOSTAT ARISTA XL - FLEXITIP (10EA/CA)

## (undated) DEVICE — SUTURE 4-0 MONOCRYL PLUS PS-2 - 27 INCH (36/BX)

## (undated) DEVICE — WRAP CO-FLEX 4IN X 5YD STERIL - SELF-ADHERENT (18/CA)

## (undated) DEVICE — CHLORAPREP 26 ML APPLICATOR - ORANGE TINT(25/CA)

## (undated) DEVICE — SUTURE 0 VICRYL PLUS UR-6 - 27 INCH (36/BX)

## (undated) DEVICE — SUTURE 0 VICRYL PLUS CT-1 - 36 INCH (36/BX)

## (undated) DEVICE — SLEEVE VASO CALF MED - (10PR/CA)

## (undated) DEVICE — CANISTER SUCTION RIGID RED 1500CC (40EA/CA)

## (undated) DEVICE — PROTECTOR ULNA NERVE - (36PR/CA)

## (undated) DEVICE — GLOVE SZ 6.5 BIOGEL PI MICRO - PF LF (50PR/BX)

## (undated) DEVICE — SUCTION INSTRUMENT YANKAUER BULBOUS TIP W/O VENT (50EA/CA)

## (undated) DEVICE — ELECTRODE DUAL RETURN W/ CORD - (50/PK)

## (undated) DEVICE — ARMREST CRADLE FOAM - (2PR/PK 12PR/CA)

## (undated) DEVICE — TUBING CLEARLINK DUO-VENT - C-FLO (48EA/CA)

## (undated) DEVICE — WATER IRRIGATION STERILE 1000ML (12EA/CA)

## (undated) DEVICE — SUTURE 2-0 SILK FS (12EA/BX)

## (undated) DEVICE — TROCAR 5X100 BLADED Z-THREAD - KII (6/BX)

## (undated) DEVICE — DETERGENT RENUZYME PLUS 10 OZ PACKET (50/BX)